# Patient Record
Sex: FEMALE | Race: WHITE | NOT HISPANIC OR LATINO | Employment: OTHER | URBAN - NONMETROPOLITAN AREA
[De-identification: names, ages, dates, MRNs, and addresses within clinical notes are randomized per-mention and may not be internally consistent; named-entity substitution may affect disease eponyms.]

---

## 2019-10-31 ENCOUNTER — OFFICE VISIT (OUTPATIENT)
Dept: INTERNAL MEDICINE | Facility: CLINIC | Age: 69
End: 2019-10-31

## 2019-10-31 VITALS
HEIGHT: 62 IN | WEIGHT: 129 LBS | HEART RATE: 83 BPM | TEMPERATURE: 97.8 F | SYSTOLIC BLOOD PRESSURE: 120 MMHG | RESPIRATION RATE: 16 BRPM | BODY MASS INDEX: 23.74 KG/M2 | DIASTOLIC BLOOD PRESSURE: 70 MMHG | OXYGEN SATURATION: 98 %

## 2019-10-31 DIAGNOSIS — R59.0 HILAR LYMPHADENOPATHY: ICD-10-CM

## 2019-10-31 DIAGNOSIS — M25.50 CHRONIC JOINT PAIN: ICD-10-CM

## 2019-10-31 DIAGNOSIS — J43.2 CENTRILOBULAR EMPHYSEMA (HCC): ICD-10-CM

## 2019-10-31 DIAGNOSIS — G89.29 CHRONIC JOINT PAIN: ICD-10-CM

## 2019-10-31 DIAGNOSIS — E78.2 MIXED HYPERLIPIDEMIA: ICD-10-CM

## 2019-10-31 DIAGNOSIS — I10 ESSENTIAL HYPERTENSION: ICD-10-CM

## 2019-10-31 DIAGNOSIS — E87.1 HYPONATREMIA: ICD-10-CM

## 2019-10-31 DIAGNOSIS — R59.0 MEDIASTINAL LYMPHADENOPATHY: ICD-10-CM

## 2019-10-31 DIAGNOSIS — E03.9 ACQUIRED HYPOTHYROIDISM: ICD-10-CM

## 2019-10-31 DIAGNOSIS — R91.8 MASS OF UPPER LOBE OF RIGHT LUNG: Primary | ICD-10-CM

## 2019-10-31 DIAGNOSIS — E27.49 ADRENAL HEMORRHAGE (HCC): ICD-10-CM

## 2019-10-31 DIAGNOSIS — E74.39 GLUCOSE INTOLERANCE: ICD-10-CM

## 2019-10-31 PROCEDURE — 99203 OFFICE O/P NEW LOW 30 MIN: CPT | Performed by: FAMILY MEDICINE

## 2019-10-31 RX ORDER — LEVOTHYROXINE SODIUM 0.12 MG/1
125 TABLET ORAL DAILY
Refills: 0 | COMMUNITY
Start: 2019-10-08 | End: 2019-10-31 | Stop reason: SDUPTHER

## 2019-10-31 RX ORDER — DICLOFENAC SODIUM 75 MG/1
75 TABLET, DELAYED RELEASE ORAL DAILY PRN
COMMUNITY
Start: 2019-09-02 | End: 2020-08-17 | Stop reason: SDUPTHER

## 2019-10-31 RX ORDER — TRAMADOL HYDROCHLORIDE 50 MG/1
50 TABLET ORAL 3 TIMES DAILY
Refills: 3 | COMMUNITY
Start: 2019-10-18 | End: 2020-03-30 | Stop reason: SDUPTHER

## 2019-10-31 RX ORDER — BACLOFEN 10 MG/1
TABLET ORAL
Refills: 1 | COMMUNITY
Start: 2019-10-08 | End: 2020-02-13

## 2019-10-31 RX ORDER — LEVOTHYROXINE SODIUM 0.12 MG/1
125 TABLET ORAL DAILY
Qty: 90 TABLET | Refills: 3 | Status: SHIPPED | OUTPATIENT
Start: 2019-10-31 | End: 2020-09-14

## 2019-10-31 RX ORDER — GABAPENTIN 300 MG/1
300 CAPSULE ORAL 3 TIMES DAILY
COMMUNITY
Start: 2019-09-18 | End: 2019-12-17 | Stop reason: SDUPTHER

## 2019-10-31 RX ORDER — PRAVASTATIN SODIUM 40 MG
40 TABLET ORAL DAILY
COMMUNITY
Start: 2019-09-05 | End: 2020-05-08 | Stop reason: SDUPTHER

## 2019-10-31 RX ORDER — TIOTROPIUM BROMIDE INHALATION SPRAY 3.12 UG/1
SPRAY, METERED RESPIRATORY (INHALATION)
COMMUNITY
Start: 2019-09-09 | End: 2019-11-25

## 2019-10-31 RX ORDER — AMLODIPINE BESYLATE 5 MG/1
5 TABLET ORAL DAILY
COMMUNITY
Start: 2019-09-15 | End: 2020-08-28 | Stop reason: SDUPTHER

## 2019-11-03 PROBLEM — I10 ESSENTIAL HYPERTENSION: Status: ACTIVE | Noted: 2019-11-03

## 2019-11-03 PROBLEM — E74.39 GLUCOSE INTOLERANCE: Status: ACTIVE | Noted: 2019-11-03

## 2019-11-03 PROBLEM — E78.2 MIXED HYPERLIPIDEMIA: Status: ACTIVE | Noted: 2019-11-03

## 2019-11-03 PROBLEM — E03.9 ACQUIRED HYPOTHYROIDISM: Status: ACTIVE | Noted: 2019-11-03

## 2019-11-03 PROBLEM — J43.2 CENTRILOBULAR EMPHYSEMA (HCC): Status: ACTIVE | Noted: 2019-11-03

## 2019-11-25 ENCOUNTER — OFFICE VISIT (OUTPATIENT)
Dept: PULMONOLOGY | Facility: CLINIC | Age: 69
End: 2019-11-25

## 2019-11-25 VITALS
OXYGEN SATURATION: 95 % | HEART RATE: 80 BPM | HEIGHT: 62 IN | DIASTOLIC BLOOD PRESSURE: 78 MMHG | RESPIRATION RATE: 18 BRPM | SYSTOLIC BLOOD PRESSURE: 140 MMHG | WEIGHT: 126 LBS | BODY MASS INDEX: 23.19 KG/M2

## 2019-11-25 DIAGNOSIS — R06.02 SHORTNESS OF BREATH: Primary | ICD-10-CM

## 2019-11-25 DIAGNOSIS — R93.89 ABNORMAL CT OF THE CHEST: ICD-10-CM

## 2019-11-25 DIAGNOSIS — R59.0 SUPRACLAVICULAR LYMPHADENOPATHY: ICD-10-CM

## 2019-11-25 DIAGNOSIS — J44.9 CHRONIC OBSTRUCTIVE PULMONARY DISEASE, UNSPECIFIED COPD TYPE (HCC): ICD-10-CM

## 2019-11-25 DIAGNOSIS — J41.0 CHRONIC BRONCHITIS, SIMPLE (HCC): ICD-10-CM

## 2019-11-25 DIAGNOSIS — F17.200 SMOKING: ICD-10-CM

## 2019-11-25 PROCEDURE — 95012 NITRIC OXIDE EXP GAS DETER: CPT | Performed by: INTERNAL MEDICINE

## 2019-11-25 PROCEDURE — 99205 OFFICE O/P NEW HI 60 MIN: CPT | Performed by: INTERNAL MEDICINE

## 2019-11-25 NOTE — PROGRESS NOTES
"CONSULT NOTE    Requested by:   Hattie Askew MD      Chief Complaint   Patient presents with   • Consult   • Breathing Problem       Subjective:  Prashanth Prajapati is a 69 y.o. female.     History of Present Illness   Patient was sent in today for evaluation of shortness of breath and abnormal CT. Patient says that for the past few years, she has had shortness of breath. Patient has had decreased exercise capacity that has been progressive for the past few years. Patient also says that she brings up phlegm in the morning along with cough.     she also reports having 1-2 recent episodes of \"bronchitis\" & pneumonia, over the past 1 year.    Patient also notes having progressive worsening in his ability to walk up the hill or walk up a flight of stairs.     Patient reports smoking 1 pack per day for the past 50 years.  she is currently smoking 1/2 pack a day.    Patient does have a family history of lung cancer in her father.    She also had a CT scan in September that was reported abnormal.     The following portions of the patient's history were reviewed and updated as appropriate: allergies, current medications, past family history, past medical history, past social history and past surgical history.    Review of Systems   Constitutional: Negative for chills, fatigue and fever.   HENT: Negative for sinus pressure, sneezing and sore throat.    Respiratory: Positive for cough and shortness of breath. Negative for chest tightness and wheezing.    Cardiovascular: Positive for palpitations. Negative for leg swelling.   Psychiatric/Behavioral: Negative for sleep disturbance.   All other systems reviewed and are negative.      Past Medical History:   Diagnosis Date   • Arthritis    • COPD (chronic obstructive pulmonary disease) (CMS/[facility])    • Hyperlipidemia    • Hypertension    • Hypothyroidism        Social History     Tobacco Use   • Smoking status: Current Every Day Smoker     Packs/day: 1.00     Years: 50.00 " "    Pack years: 50.00     Types: Cigarettes   • Smokeless tobacco: Never Used   Substance Use Topics   • Alcohol use: No     Frequency: Never         Objective:  Visit Vitals  /78   Pulse 80   Resp 18   Ht 157.5 cm (62.01\")   Wt 57.2 kg (126 lb)   SpO2 95%   BMI 23.04 kg/m²       Physical Exam   Constitutional: She is oriented to person, place, and time. She appears well-developed.   HENT:   Dentures noted.   Eyes: EOM are normal.   Neck: Neck supple. No JVD present.   Cardiovascular: Normal rate and regular rhythm.   Pulmonary/Chest: Effort normal. She has wheezes. She has no rales.   Somewhat hyperresonant to percussion.  Decreased Air Entry Bilaterally.   Musculoskeletal:   Gait was normal.   Lymphadenopathy:        Left: Supraclavicular (non tender) adenopathy present.   Neurological: She is alert and oriented to person, place, and time.   Skin: Skin is warm and dry.   Psychiatric: She has a normal mood and affect. Her behavior is normal.   Vitals reviewed.        Assessment/Plan:  Prashanth was seen today for consult and breathing problem.    Diagnoses and all orders for this visit:    Shortness of breath  -     Cancel: Pulmonary Function Test  -     Nitric Oxide  -     Peak Flow  -     Pulmonary Function Test; Future    Chronic obstructive pulmonary disease, unspecified COPD type (CMS/HCC)  -     Cancel: Pulmonary Function Test  -     Nitric Oxide  -     Peak Flow  -     Pulmonary Function Test; Future    Chronic bronchitis, simple (CMS/HCC)    Smoking    Abnormal CT of the chest    Supraclavicular lymphadenopathy  -     Ambulatory Referral to General Surgery    Other orders  -     tiotropium bromide-olodaterol (STIOLTO RESPIMAT) 2.5-2.5 MCG/ACT aerosol solution inhaler; Inhale 2 puffs Daily.        Return in about 2 weeks (around 12/9/2019) for Recheck, PFT F/U, Overbook, For Biopsy results.    DISCUSSION(if any):  Last CT scan was reviewed in great detail with the patient. Images reviewed personally. "   The CT scan images were consistent with abnormality including emphysema, right upper lobe nodule (2.5 cm or so) and enlarged mediastinal lymph nodes.    I have also reviewed her last discharge summary from [facility] in [place] that mentions acute adrenal hemorrhage, hypertension and was suggested to have a left supraclavicular biopsy, but could not be done. It also listed that she left against medical advice. This was from 9/14/2019.     ===========================  ===========================    FeNO level & Peak flow was obtained and discussed with the patient today.    PFTs were ordered for follow up visit.     ===========================  ===========================    Orders as above.    I have told the patient, that in my view, shortness of breath is most likely from underlying chronic obstructive lung disease.     Patient was given education and demonstration on how to use the medicine.     Side effects, of prescribed medicines, discussed.    Patient was also instructed on compliance and adherence with instructions.     I have discussed the need to quit smoking as soon as possible.    Patient was offered modalities such as Chantix/nicotine patches/Wellbutrin to aid in smoking cessation.    The patient will get back to us regarding the choice, once a decision has been taken.     Patient was given reading material, as appropriate.     Patient was asked to call with any concerns.     I told the patient that although she has multiple mediastinal lymph nodes, and a right upper lobe nodule, she has easily accessible left supraclavicular lymph nodes which are palpable on physical exam.    I was able to discuss the findings with Dr. Owen who has graciously agreed to see the patient within the next 2 to 3 days.    I told the patient that the first step would be to obtain supraclavicular lymph node biopsy and if that does not reveal diagnosis consistent with the CT findings, then we will definitely  proceed with endobronchial ultrasound-guided needle biopsy.    Patient will be followed clinically to assess for response to treatment and further recommendations will be made, based on response.      Dictated utilizing Dragon dictation.    This document was electronically signed by Stefany Olvera MD on 11/25/19 at 10:58 AM

## 2019-11-27 ENCOUNTER — OFFICE VISIT (OUTPATIENT)
Dept: SURGERY | Facility: CLINIC | Age: 69
End: 2019-11-27

## 2019-11-27 VITALS
DIASTOLIC BLOOD PRESSURE: 84 MMHG | WEIGHT: 127.8 LBS | BODY MASS INDEX: 23.52 KG/M2 | HEIGHT: 62 IN | TEMPERATURE: 98.3 F | HEART RATE: 89 BPM | OXYGEN SATURATION: 97 % | SYSTOLIC BLOOD PRESSURE: 142 MMHG

## 2019-11-27 DIAGNOSIS — R59.0 LYMPHADENOPATHY, SUPRACLAVICULAR: Primary | ICD-10-CM

## 2019-11-27 PROCEDURE — 99204 OFFICE O/P NEW MOD 45 MIN: CPT | Performed by: SURGERY

## 2019-11-27 RX ORDER — SODIUM CHLORIDE, SODIUM LACTATE, POTASSIUM CHLORIDE, CALCIUM CHLORIDE 600; 310; 30; 20 MG/100ML; MG/100ML; MG/100ML; MG/100ML
50 INJECTION, SOLUTION INTRAVENOUS CONTINUOUS
Status: CANCELLED | OUTPATIENT
Start: 2019-12-02

## 2019-11-27 RX ORDER — SODIUM CHLORIDE 0.9 % (FLUSH) 0.9 %
3 SYRINGE (ML) INJECTION EVERY 12 HOURS SCHEDULED
Status: CANCELLED | OUTPATIENT
Start: 2019-12-02

## 2019-11-27 RX ORDER — ACETAMINOPHEN 500 MG
500 TABLET ORAL EVERY 6 HOURS PRN
COMMUNITY

## 2019-11-27 RX ORDER — SODIUM CHLORIDE 0.9 % (FLUSH) 0.9 %
10 SYRINGE (ML) INJECTION AS NEEDED
Status: CANCELLED | OUTPATIENT
Start: 2019-12-02

## 2019-11-27 RX ORDER — CEFAZOLIN SODIUM 2 G/50ML
2 SOLUTION INTRAVENOUS ONCE
Status: CANCELLED | OUTPATIENT
Start: 2019-12-02 | End: 2019-11-27

## 2019-12-02 ENCOUNTER — HOSPITAL ENCOUNTER (OUTPATIENT)
Facility: HOSPITAL | Age: 69
Setting detail: HOSPITAL OUTPATIENT SURGERY
Discharge: HOME | End: 2019-12-02
Attending: SURGERY | Admitting: SURGERY

## 2019-12-02 ENCOUNTER — ANESTHESIA EVENT (OUTPATIENT)
Dept: PERIOP | Facility: HOSPITAL | Age: 69
End: 2019-12-02

## 2019-12-02 ENCOUNTER — ANESTHESIA (OUTPATIENT)
Dept: PERIOP | Facility: HOSPITAL | Age: 69
End: 2019-12-02

## 2019-12-02 VITALS
HEART RATE: 66 BPM | DIASTOLIC BLOOD PRESSURE: 64 MMHG | TEMPERATURE: 97.9 F | BODY MASS INDEX: 23.37 KG/M2 | WEIGHT: 127 LBS | SYSTOLIC BLOOD PRESSURE: 109 MMHG | RESPIRATION RATE: 16 BRPM | OXYGEN SATURATION: 92 % | HEIGHT: 62 IN

## 2019-12-02 DIAGNOSIS — R59.0 LYMPHADENOPATHY, SUPRACLAVICULAR: ICD-10-CM

## 2019-12-02 PROCEDURE — 25010000003 CEFAZOLIN SODIUM-DEXTROSE 2-3 GM-%(50ML) RECONSTITUTED SOLUTION: Performed by: SURGERY

## 2019-12-02 PROCEDURE — 38520 BIOPSY/REMOVAL LYMPH NODES: CPT | Performed by: SURGERY

## 2019-12-02 PROCEDURE — 25010000002 ONDANSETRON PER 1 MG: Performed by: NURSE ANESTHETIST, CERTIFIED REGISTERED

## 2019-12-02 PROCEDURE — 25010000003 LIDOCAINE 1 % SOLUTION: Performed by: SURGERY

## 2019-12-02 PROCEDURE — 25010000002 PROPOFOL 10 MG/ML EMULSION: Performed by: NURSE ANESTHETIST, CERTIFIED REGISTERED

## 2019-12-02 PROCEDURE — 25010000002 FENTANYL CITRATE (PF) 100 MCG/2ML SOLUTION: Performed by: NURSE ANESTHETIST, CERTIFIED REGISTERED

## 2019-12-02 PROCEDURE — 25010000002 MIDAZOLAM PER 1MG: Performed by: NURSE ANESTHETIST, CERTIFIED REGISTERED

## 2019-12-02 RX ORDER — SODIUM CHLORIDE 0.9 % (FLUSH) 0.9 %
3 SYRINGE (ML) INJECTION EVERY 12 HOURS SCHEDULED
Status: DISCONTINUED | OUTPATIENT
Start: 2019-12-02 | End: 2019-12-02 | Stop reason: HOSPADM

## 2019-12-02 RX ORDER — HYDROCODONE BITARTRATE AND ACETAMINOPHEN 7.5; 325 MG/1; MG/1
1 TABLET ORAL ONCE AS NEEDED
Status: CANCELLED | OUTPATIENT
Start: 2019-12-02 | End: 2019-12-12

## 2019-12-02 RX ORDER — CEFAZOLIN SODIUM 2 G/50ML
2 SOLUTION INTRAVENOUS ONCE
Status: COMPLETED | OUTPATIENT
Start: 2019-12-02 | End: 2019-12-02

## 2019-12-02 RX ORDER — ONDANSETRON 2 MG/ML
INJECTION INTRAMUSCULAR; INTRAVENOUS AS NEEDED
Status: DISCONTINUED | OUTPATIENT
Start: 2019-12-02 | End: 2019-12-02 | Stop reason: SURG

## 2019-12-02 RX ORDER — PROMETHAZINE HYDROCHLORIDE 25 MG/ML
12.5 INJECTION, SOLUTION INTRAMUSCULAR; INTRAVENOUS ONCE AS NEEDED
Status: CANCELLED | OUTPATIENT
Start: 2019-12-02

## 2019-12-02 RX ORDER — FENTANYL CITRATE 50 UG/ML
INJECTION, SOLUTION INTRAMUSCULAR; INTRAVENOUS AS NEEDED
Status: DISCONTINUED | OUTPATIENT
Start: 2019-12-02 | End: 2019-12-02 | Stop reason: SURG

## 2019-12-02 RX ORDER — KETAMINE HCL IN NACL, ISO-OSM 100MG/10ML
SYRINGE (ML) INJECTION AS NEEDED
Status: DISCONTINUED | OUTPATIENT
Start: 2019-12-02 | End: 2019-12-02 | Stop reason: SURG

## 2019-12-02 RX ORDER — SODIUM CHLORIDE, SODIUM LACTATE, POTASSIUM CHLORIDE, CALCIUM CHLORIDE 600; 310; 30; 20 MG/100ML; MG/100ML; MG/100ML; MG/100ML
50 INJECTION, SOLUTION INTRAVENOUS CONTINUOUS
Status: DISCONTINUED | OUTPATIENT
Start: 2019-12-02 | End: 2019-12-02 | Stop reason: HOSPADM

## 2019-12-02 RX ORDER — SODIUM CHLORIDE 0.9 % (FLUSH) 0.9 %
10 SYRINGE (ML) INJECTION AS NEEDED
Status: DISCONTINUED | OUTPATIENT
Start: 2019-12-02 | End: 2019-12-02 | Stop reason: HOSPADM

## 2019-12-02 RX ORDER — LIDOCAINE HYDROCHLORIDE 10 MG/ML
INJECTION, SOLUTION INFILTRATION; PERINEURAL AS NEEDED
Status: DISCONTINUED | OUTPATIENT
Start: 2019-12-02 | End: 2019-12-02 | Stop reason: HOSPADM

## 2019-12-02 RX ORDER — HYDROCODONE BITARTRATE AND ACETAMINOPHEN 7.5; 325 MG/1; MG/1
1 TABLET ORAL EVERY 4 HOURS PRN
Qty: 10 TABLET | Refills: 0 | Status: ON HOLD | OUTPATIENT
Start: 2019-12-02 | End: 2020-01-03 | Stop reason: SDUPTHER

## 2019-12-02 RX ORDER — ONDANSETRON 2 MG/ML
4 INJECTION INTRAMUSCULAR; INTRAVENOUS ONCE AS NEEDED
Status: CANCELLED | OUTPATIENT
Start: 2019-12-02

## 2019-12-02 RX ORDER — MIDAZOLAM HYDROCHLORIDE 2 MG/2ML
INJECTION, SOLUTION INTRAMUSCULAR; INTRAVENOUS AS NEEDED
Status: DISCONTINUED | OUTPATIENT
Start: 2019-12-02 | End: 2019-12-02 | Stop reason: SURG

## 2019-12-02 RX ORDER — LIDOCAINE HYDROCHLORIDE 20 MG/ML
INJECTION, SOLUTION INTRAVENOUS AS NEEDED
Status: DISCONTINUED | OUTPATIENT
Start: 2019-12-02 | End: 2019-12-02 | Stop reason: SURG

## 2019-12-02 RX ORDER — MAGNESIUM HYDROXIDE 1200 MG/15ML
LIQUID ORAL AS NEEDED
Status: DISCONTINUED | OUTPATIENT
Start: 2019-12-02 | End: 2019-12-02 | Stop reason: HOSPADM

## 2019-12-02 RX ADMIN — Medication 10 MG: at 13:52

## 2019-12-02 RX ADMIN — ONDANSETRON 4 MG: 2 INJECTION INTRAMUSCULAR; INTRAVENOUS at 14:35

## 2019-12-02 RX ADMIN — MIDAZOLAM HYDROCHLORIDE 1 MG: 1 INJECTION, SOLUTION INTRAMUSCULAR; INTRAVENOUS at 13:47

## 2019-12-02 RX ADMIN — SODIUM CHLORIDE, POTASSIUM CHLORIDE, SODIUM LACTATE AND CALCIUM CHLORIDE 50 ML/HR: 600; 310; 30; 20 INJECTION, SOLUTION INTRAVENOUS at 11:25

## 2019-12-02 RX ADMIN — PROPOFOL 100 MCG/KG/MIN: 10 INJECTION, EMULSION INTRAVENOUS at 13:52

## 2019-12-02 RX ADMIN — LIDOCAINE HYDROCHLORIDE 60 MG: 20 INJECTION, SOLUTION INTRAVENOUS at 13:52

## 2019-12-02 RX ADMIN — CEFAZOLIN SODIUM 2 G: 2 SOLUTION INTRAVENOUS at 13:53

## 2019-12-02 RX ADMIN — FENTANYL CITRATE 50 MCG: 50 INJECTION INTRAMUSCULAR; INTRAVENOUS at 13:47

## 2019-12-02 NOTE — OP NOTE
PROCEDURE DATE: 12/2/2019    SURGEON: Bebe Owen MD, FACS    PREOPERATIVE DIAGNOSIS: Lymphadenopathy, supraclavicular [R59.0]     POSTOPERATIVE DIAGNOSIS: Same    PROCEDURE: Left supraclavicular lymph node biopsy    ANESTHESIA: MAC    EBL: 10ml    SPECIMENS:    A Lymph Node Tissue · TISSUE PATHOLOGY EXAM  · ADDITIONAL PATHOLOGY REQUEST   Bebe Owen MD 12/2/19 1435 No     Description: SPECIMEN IN RPMI FOR FLOW       INDICATIONS FOR THE PROCEDURE: Patient is a 8 seen today with a history of intractable 9-year-old female who was recently found to have a 2.6 cm mass in the right upper lobe on CT imaging at outside facility with associated mediastinal instability or adenopathy.  This raise concern for a primary lung adenocarcinoma.  She was actually referred by oncology for a biopsy for tissue diagnosis.  She had counseled regarding the risks, benefits, and alternatives of surgical procedure, and I provided her informed consent to proceed.    DESCRIPTION OF THE PROCEDURE:  The patient was seen and examined in the preoperative holding area on the day of surgery and there are no changes to the medical history.  The patient was then taken to the operating room and placed supine on the operating table where a timeout is performed using the WHO checklist.  The patient received appropriate preoperative antibiotics and SCDs were placed for DVT prophylaxis.  The surgical site was marked in the preoperative holding area.    Following the satisfactory induction of anesthesia the patient's left neck was prepped and draped in the usual sterile fashion.  The previously marked area of the skin overlying the palpable left supraclavicular lymph node was preemptively anesthetized with 1% lidocaine.  The area was incised with 15 blade knife along a natural crease in the skin.  This was deepened through the epidermis and dermis into the subcutaneous tissue.  Careful blunt dissection was used to dissect down to the palpable  lymph node.  Care was taken to avoid injury to the external jugular vein which was overlying, and adherent to the left node in question.  The lymph node was noted to be grossly enlarged and hardened.  Rather tedious dissection was required to dissect the node away from the adjacent structures in the neck including the omental resection around the enlarged lymph node.  Hemostasis was achieved with a combination of surgical clips and a careful Bovie electrocautery.     Once this was completed, the node was able to be delivered out into the wound.  The pedicle was identified and doubly clipped and subsequently divided with the Metzenbaum scissors.  The node was then dissected with a 10 blade knife and passed off the field as specimen for pathology.  Half of the node was placed into buffered formalin.  The remainder was placed into RPMI solution in the event of flow cytometry is necessary.  Hemostasis was checked and found to be excellent.  The wound was then closed in layers.  The dead space was closed with interrupted 3-0 Vicryl.  The same was used for the subtendinous fat.  The skin was closed using a running, the particular 4-0 Vicryl.    0.25% Marcaine was injected into the wounds for postoperative analgesia.  Mastisol and steri strips were applied to the wounds and covered with dry sterile dressings.    There were no immediate complications noted and the procedure was well tolerated by the patient.  All sponge, needle,  and instrument  counts were correct at the conclusion of procedure.  Dr. Owen was present scrubbed for the procedure and its entirety.  The patient was awakened from anesthesia and taken to the recovery room in good condition.

## 2019-12-02 NOTE — ANESTHESIA POSTPROCEDURE EVALUATION
Patient: Prashanth Prajapati    Procedure Summary     Date:  12/02/19 Room / Location:  [facility] OR  /  [facility]    Anesthesia Start:  1345 Anesthesia Stop:  1501    Procedure:  SUPRACLAVICULAR LYMPH NODE BIOPSY/EXCISION (Left Neck) Diagnosis:       Lymphadenopathy, supraclavicular      (Lymphadenopathy, supraclavicular [R59.0])    Surgeon:  Bebe Owen MD Provider:  Olivia Glasgow CRNA    Anesthesia Type:  MAC ASA Status:  3          Anesthesia Type: MAC  Last vitals  BP   107/62 (12/02/19 1500)   Temp   97.9 °F (36.6 °C) (12/02/19 1500)   Pulse   65 (12/02/19 1500)   Resp   16 (12/02/19 1500)     SpO2   92 % (12/02/19 1500)     Post Anesthesia Care and Evaluation    Patient location during evaluation: PHASE II  Patient participation: complete - patient participated  Level of consciousness: awake and alert  Pain score: 0  Pain management: satisfactory to patient  Airway patency: patent  Anesthetic complications: No anesthetic complications  PONV Status: none  Cardiovascular status: acceptable and stable  Respiratory status: acceptable  Hydration status: acceptable

## 2019-12-02 NOTE — DISCHARGE INSTRUCTIONS
No pushing, pulling, tugging,  heavy lifting, or strenuous activity.  No major decision making, driving, or drinking alcoholic beverages for 24 hours. ( due to the medications you have  received)  Always use good hand hygiene/washing techniques.  NO driving while taking pain medications.    * if you have an incision:  Check your incision area every day for signs of infection.   Check for:  * more redness, swelling, or pain  *more fluid or blood  *warmth  *pus or bad smell    To assist you in voiding:  Drink plenty of fluids  Listen to running water while attempting to void.    If you are unable to urinate and you have an uncomfortable urge to void or it has been   6 hours since you were discharged, return to the Emergency Room

## 2019-12-02 NOTE — ANESTHESIA PREPROCEDURE EVALUATION
Anesthesia Evaluation     history of anesthetic complications: PONV  NPO Solid Status: > 8 hours  NPO Liquid Status: > 8 hours           Airway   Mallampati: II  TM distance: >3 FB  Neck ROM: full  No difficulty expected  Dental - normal exam     Pulmonary - normal exam   (+) a smoker Current Smoked day of surgery, COPD moderate,   Cardiovascular - normal exam  Exercise tolerance: good (4-7 METS)    (+) hypertension well controlled 2 medications or greater, hyperlipidemia,       Neuro/Psych- negative ROS  GI/Hepatic/Renal/Endo    (+)   thyroid problem hypothyroidism    Musculoskeletal     Abdominal    Substance History - negative use     OB/GYN          Other   arthritis,                      Anesthesia Plan    ASA 3     MAC   (Risks and benefits discussed including risk of aspiration, recall and dental damage. All patient questions answered. Will continue with POC.)  intravenous induction     Anesthetic plan, all risks, benefits, and alternatives have been provided, discussed and informed consent has been obtained with: patient.    Plan discussed with CRNA.

## 2019-12-05 ENCOUNTER — DOCUMENTATION (OUTPATIENT)
Dept: PULMONOLOGY | Facility: CLINIC | Age: 69
End: 2019-12-05

## 2019-12-05 NOTE — PROGRESS NOTES
Pathology results were reviewed.  The patient underwent excisional supraclavicular lymph node biopsy.  The lymph node biopsy was consistent with small cell cancer.    Dr. Owen and myself discussed the findings on pathology.  We also reviewed the patient's chart and she already has an appointment with Dr. Cao on 11 December.    I will forward this information to Dr. Cao.      This document was electronically signed by Stefany Olvera MD on 12/05/19 at 2:27 PM

## 2019-12-06 NOTE — PROGRESS NOTES
Subjective   Prashanth Prajapati is a 69 y.o. female.   Chief Complaint   Patient presents with   • Follow-up     biopsy        History of Present Illness    returns the office today for follow-up after recently undergoing an excisional biopsy of a left supraclavicular lymph node on 12/2/2019.  Unfortunately, this was positive for a metastatic small cell carcinoma.  It was previously suspected based on prior imaging that the patient did have a lung primary and that the noted supraclavicular adenopathy would be metastatic in nature.  Patient the pathology findings, and seems that this is the case.  Fortunately, the patient is scheduled to see medical oncology in the near future.  She has also seen Dr. Olvera in follow-up prior to today's appointment.  Surgically, there have been no postoperative issues.  She denies pain related to the area.  She denies problems with the incision.    The following portions of the patient's history were reviewed and updated as appropriate: allergies, current medications, past family history, past medical history, past social history, past surgical history and problem list.    Review of Systems   Constitutional: Negative for chills, fever and unexpected weight change.   HENT: Negative for hearing loss, trouble swallowing and voice change.    Eyes: Negative for visual disturbance.   Respiratory: Negative for apnea, cough, chest tightness, shortness of breath and wheezing.    Cardiovascular: Negative for chest pain, palpitations and leg swelling.   Gastrointestinal: Negative for abdominal distention, abdominal pain, anal bleeding, blood in stool, constipation, diarrhea, nausea, rectal pain and vomiting.   Endocrine: Negative for cold intolerance and heat intolerance.   Genitourinary: Negative for difficulty urinating, dysuria and flank pain.   Musculoskeletal: Negative for back pain and gait problem.   Skin: Negative for color change, rash and wound.   Neurological: Negative for  "dizziness, syncope, speech difficulty, weakness, light-headedness, numbness and headaches.   Hematological: Negative for adenopathy. Does not bruise/bleed easily.   Psychiatric/Behavioral: Negative for confusion. The patient is not nervous/anxious.        Objective    /76   Pulse 82   Temp 97.5 °F (36.4 °C)   Ht 157.5 cm (62\")   Wt 57.6 kg (127 lb)   SpO2 98%   BMI 23.23 kg/m²     Physical Exam   Constitutional: She is oriented to person, place, and time. She appears well-developed and well-nourished.   HENT:   Head: Normocephalic and atraumatic.   Neck:   Left supraclavicular incision is healing well without cellulitis or erythema.  Near this incision, there is persistent, stable supraclavicular and cervical chain adenopathy.   Cardiovascular: Regular rhythm.   Pulmonary/Chest: Effort normal.   Neurological: She is alert and oriented to person, place, and time.   Skin: Skin is warm and dry.   Psychiatric: She has a normal mood and affect. Her behavior is normal.       Assessment/Plan   Prashanth was seen today for follow-up.    Diagnoses and all orders for this visit:    SCLC (small cell lung carcinoma) (CMS/HCC)      Ms. Prajapati is a 69-year-old female patient with recently diagnosed with metastatic small cell lung cancer.  From the standpoint of her recent lymph node biopsy, she is doing well.  She has upcoming follow-up with medical oncology, and was advised to keep this appointment.  I discussed with her that my role in her treatment was limited to providing technical services such as the lymph node biopsy.  It is my suspicion that she will likely need a Port-A-Cath in the future for administration of chemotherapy.  She and I discussed this in brief.  I advised her that if it is the case that she needs a port inserted, we can make these arrangements through this office over the phone without requiring additional office evaluation.  She understands this.  She will contact me after seeing Dr. Cao in " consultation if a port is needed.

## 2019-12-07 LAB
LAB AP CASE REPORT: NORMAL
PATH REPORT.ADDENDUM SPEC: NORMAL
PATH REPORT.FINAL DX SPEC: NORMAL

## 2019-12-09 ENCOUNTER — OFFICE VISIT (OUTPATIENT)
Dept: PULMONOLOGY | Facility: CLINIC | Age: 69
End: 2019-12-09

## 2019-12-09 VITALS
OXYGEN SATURATION: 95 % | HEART RATE: 90 BPM | WEIGHT: 128 LBS | DIASTOLIC BLOOD PRESSURE: 70 MMHG | HEIGHT: 62 IN | SYSTOLIC BLOOD PRESSURE: 122 MMHG | RESPIRATION RATE: 18 BRPM | BODY MASS INDEX: 23.55 KG/M2

## 2019-12-09 DIAGNOSIS — R06.02 SHORTNESS OF BREATH: ICD-10-CM

## 2019-12-09 DIAGNOSIS — R06.02 SOB (SHORTNESS OF BREATH): Primary | ICD-10-CM

## 2019-12-09 DIAGNOSIS — J44.9 CHRONIC OBSTRUCTIVE PULMONARY DISEASE, UNSPECIFIED COPD TYPE (HCC): ICD-10-CM

## 2019-12-09 DIAGNOSIS — C34.90 SMALL CELL LUNG CANCER IN ADULT (HCC): ICD-10-CM

## 2019-12-09 DIAGNOSIS — R93.89 ABNORMAL CT OF THE CHEST: Primary | ICD-10-CM

## 2019-12-09 DIAGNOSIS — R59.0 SUPRACLAVICULAR LYMPHADENOPATHY: ICD-10-CM

## 2019-12-09 PROCEDURE — 99214 OFFICE O/P EST MOD 30 MIN: CPT | Performed by: INTERNAL MEDICINE

## 2019-12-09 PROCEDURE — 94060 EVALUATION OF WHEEZING: CPT | Performed by: INTERNAL MEDICINE

## 2019-12-09 PROCEDURE — 94726 PLETHYSMOGRAPHY LUNG VOLUMES: CPT | Performed by: INTERNAL MEDICINE

## 2019-12-09 PROCEDURE — 94729 DIFFUSING CAPACITY: CPT | Performed by: INTERNAL MEDICINE

## 2019-12-09 PROCEDURE — G0009 ADMIN PNEUMOCOCCAL VACCINE: HCPCS | Performed by: INTERNAL MEDICINE

## 2019-12-09 PROCEDURE — 90670 PCV13 VACCINE IM: CPT | Performed by: INTERNAL MEDICINE

## 2019-12-09 NOTE — PROGRESS NOTES
"Chief Complaint   Patient presents with   • Follow-up   • Shortness of Breath       Subjective   Prashanth Prajapati is a 69 y.o. female.     History of Present Illness   Patient comes today for follow up of shortness of breath and COPD.     Patient says that her symptoms have been stable since the last clinic visit. she reports no recent exacerbations.     Patient is using medications, as prescribed. Exercise tolerance has also remained stable.     Continues to smoke 1/2 pack per day.    She also underwent left supraclavicular lymph node biopsy.       The following portions of the patient's history were reviewed and updated as appropriate: allergies, current medications, past family history, past medical history, past social history and past surgical history.    Review of Systems   Constitutional: Negative for chills and fever.   HENT: Negative for rhinorrhea, sinus pressure and sore throat.    Respiratory: Negative for cough, chest tightness, shortness of breath and wheezing.    Psychiatric/Behavioral: Negative for sleep disturbance.       Objective   Visit Vitals  /70   Pulse 90   Resp 18   Ht 157.5 cm (62\")   Wt 58.1 kg (128 lb)   SpO2 95%   BMI 23.41 kg/m²       Physical Exam   Constitutional: She is oriented to person, place, and time. She appears well-developed and well-nourished.   HENT:   Left supraclavicular area with dressing.    Eyes: EOM are normal.   Neck: Neck supple.   Cardiovascular: Normal rate and regular rhythm.   Pulmonary/Chest: Effort normal. No respiratory distress.   Somewhat hyperresonant to percussion  Somewhat decreased A/E with out wheezing noted.   Musculoskeletal: She exhibits no edema.   Neurological: She is alert and oriented to person, place, and time.   Skin: Skin is warm and dry.   Psychiatric: She has a normal mood and affect.   Vitals reviewed.      Assessment/Plan   Prashanth was seen today for follow-up and shortness of breath.    Diagnoses and all orders for this " visit:    Abnormal CT of the chest    Supraclavicular lymphadenopathy    Chronic obstructive pulmonary disease, unspecified COPD type (CMS/HCC)  -     Pulmonary Function Test    Small cell lung cancer in adult (CMS/HCC)    Shortness of breath  -     Pulmonary Function Test    Other orders  -     Pneumococcal Conjugate Vaccine 13-Valent All           Return in about 3 months (around 3/9/2020) for Recheck.    DISCUSSION (if any):  Pathology was reviewed with her and her sister in great detail.     PFTs were performed today and reviewed. Showed mild obstruction.     We have reviewed her pulmonary medications in great detail.    Any needed adjustments to her pulmonary medications, either for clinical or insurance coverage reasons, have been made and are reflected in the orders.    Compliance with medications stressed.     Side effects of prescribed medications discussed with the patient    Patient was strongly encouraged to quit smoking as soon as possible.    Small Cell lung cancer. Will need to be followed soon with Oncology. She has an appointment with Dr. Cao. She was asked to keep that appointment.     After discussion about the need for pneumococcal-13 vaccination, it was administered today.      Dictated utilizing Dragon dictation.    This document was electronically signed by Stefany Olvera MD on 12/09/19 at 11:14 AM

## 2019-12-10 ENCOUNTER — OFFICE VISIT (OUTPATIENT)
Dept: SURGERY | Facility: CLINIC | Age: 69
End: 2019-12-10

## 2019-12-10 VITALS
OXYGEN SATURATION: 98 % | BODY MASS INDEX: 23.37 KG/M2 | WEIGHT: 127 LBS | SYSTOLIC BLOOD PRESSURE: 148 MMHG | TEMPERATURE: 97.5 F | HEART RATE: 82 BPM | DIASTOLIC BLOOD PRESSURE: 76 MMHG | HEIGHT: 62 IN

## 2019-12-10 DIAGNOSIS — C34.90 SCLC (SMALL CELL LUNG CARCINOMA) (HCC): Primary | ICD-10-CM

## 2019-12-10 PROCEDURE — 99024 POSTOP FOLLOW-UP VISIT: CPT | Performed by: SURGERY

## 2019-12-11 ENCOUNTER — CONSULT (OUTPATIENT)
Dept: ONCOLOGY | Facility: CLINIC | Age: 69
End: 2019-12-11

## 2019-12-11 VITALS
TEMPERATURE: 97 F | HEART RATE: 81 BPM | WEIGHT: 127 LBS | HEIGHT: 62 IN | DIASTOLIC BLOOD PRESSURE: 73 MMHG | SYSTOLIC BLOOD PRESSURE: 142 MMHG | BODY MASS INDEX: 23.37 KG/M2

## 2019-12-11 DIAGNOSIS — C34.90 SCLC (SMALL CELL LUNG CARCINOMA) (HCC): Primary | ICD-10-CM

## 2019-12-11 PROCEDURE — 99205 OFFICE O/P NEW HI 60 MIN: CPT | Performed by: INTERNAL MEDICINE

## 2019-12-11 RX ORDER — LIDOCAINE AND PRILOCAINE 25; 25 MG/G; MG/G
CREAM TOPICAL AS NEEDED
Qty: 30 G | Refills: 3 | Status: SHIPPED | OUTPATIENT
Start: 2019-12-11

## 2019-12-11 NOTE — PROGRESS NOTES
DATE OF CONSULTATION: 12/11/2019    REFERRING PHYSICIAN: Hattie Askew MD    Dear Hattie Mendoza MD  Thank you for asking for my medical advice on this patient. I saw her in the  [place] office on 12/11/2019     REASON FOR CONSULTATION: Extensive stage small cell lung cancer     HISTORY OF PRESENT ILLNESS: The patient is a very pleasant 69 y.o.  female   who was in her usual state of health until September 2019.  Patient presented with right lower ribs pain.  She was admitted to outside hospital in [place] scans revealed right upper lobe lung mass adrenal met and mediastinal adenopathy.  The plan was to proceed with biopsy however patient has sold her house and she was in process of moving to [place].  When she got to [facility] she is so primary care provider Dr. Askew who did set her up with Dr. Owen who did lymph node biopsy left cervical lymph node that came back consistent with small cell lung cancer.  The patient was referred to me for further recommendations.    SUBJECTIVE: When I saw the patient today she is here with her sister but she is complaining of cough, she is been having mild shortness of breath where she lost 25 pounds over the last 2 years.    Review of Systems   Constitutional: Positive for fatigue and unexpected weight change. Negative for activity change, appetite change, chills and fever.   HENT: Negative for hearing loss, mouth sores, nosebleeds, sore throat and trouble swallowing.    Eyes: Negative for visual disturbance.   Respiratory: Positive for cough. Negative for chest tightness, shortness of breath and wheezing.    Cardiovascular: Negative for chest pain, palpitations and leg swelling.   Gastrointestinal: Negative for abdominal distention, abdominal pain, blood in stool, constipation, diarrhea, nausea, rectal pain and vomiting.   Endocrine: Negative for cold intolerance and heat intolerance.   Genitourinary: Negative for difficulty urinating, dysuria,  frequency and urgency.   Musculoskeletal: Negative for arthralgias, back pain, gait problem, joint swelling and myalgias.   Skin: Negative for rash.   Neurological: Negative for dizziness, tremors, syncope, weakness, light-headedness, numbness and headaches.   Hematological: Negative for adenopathy. Does not bruise/bleed easily.   Psychiatric/Behavioral: Negative for confusion, sleep disturbance and suicidal ideas. The patient is not nervous/anxious.        Past Medical History:   Diagnosis Date   • Arthritis    • Body piercing     both ears   • Colitis    • COPD (chronic obstructive pulmonary disease) (CMS/[facility])    • Diabetes mellitus (CMS/[facility])    • Elevated cholesterol    • Full dentures    • Hyperlipidemia    • Hypertension    • Hypothyroidism    • PONV (postoperative nausea and vomiting)    • Wears glasses     for dentures       Social History     Socioeconomic History   • Marital status:      Spouse name: Not on file   • Number of children: Not on file   • Years of education: Not on file   • Highest education level: Not on file   Tobacco Use   • Smoking status: Current Every Day Smoker     Packs/day: 0.50     Years: 50.00     Pack years: 25.00     Types: Cigarettes   • Smokeless tobacco: Never Used   Substance and Sexual Activity   • Alcohol use: No     Frequency: Never   • Drug use: No   • Sexual activity: Defer       Family History   Problem Relation Age of Onset   • Arthritis Mother    • Hypertension Mother    • Hyperlipidemia Mother    • Lung cancer Father    • Cancer Father    • Diabetes Sister    • Arthritis Maternal Grandmother    • Stroke Maternal Grandmother    • Diabetes Brother        Past Surgical History:   Procedure Laterality Date   • CARPAL TUNNEL RELEASE Left    • CERVICAL LYMPH NODE BIOPSY/EXCISION Left 12/2/2019    Procedure: SUPRACLAVICULAR LYMPH NODE BIOPSY/EXCISION;  Surgeon: Bebe Owen MD;  Location: [facility];  Service: General   • COLONOSCOPY  2014   • HAND SURGERY   "02/2018   • KNEE ARTHROSCOPY     • LAPAROSCOPIC TUBAL LIGATION     • MOUTH SURGERY      full extraction of teeth       Allergies   Allergen Reactions   • Erythromycin Itching   • Penicillins Other (See Comments)     Report unknown reaction in childhood. Has taken ampicillin without difficulty.            Current Outpatient Medications:   •  amLODIPine (NORVASC) 5 MG tablet, Take 5 mg by mouth Daily., Disp: , Rfl:   •  baclofen (LIORESAL) 10 MG tablet, 10 mg QHS, Disp: , Rfl: 1  •  Calcium Carbonate-Vitamin D (CALCIUM-CARB 600 + D) 600-125 MG-UNIT tablet, Take 500 mg by mouth 2 (Two) Times a Day., Disp: , Rfl:   •  diclofenac (VOLTAREN) 75 MG EC tablet, Take 75 mg by mouth Daily As Needed., Disp: , Rfl:   •  gabapentin (NEURONTIN) 300 MG capsule, Take 300 mg by mouth 3 (Three) Times a Day., Disp: , Rfl:   •  HYDROcodone-acetaminophen (NORCO) 7.5-325 MG per tablet, Take 1 tablet by mouth Every 4 (Four) Hours As Needed for Moderate Pain, Disp: 10 tablet, Rfl: 0  •  levothyroxine (SYNTHROID, LEVOTHROID) 125 MCG tablet, Take 1 tablet by mouth Daily., Disp: 90 tablet, Rfl: 3  •  pravastatin (PRAVACHOL) 40 MG tablet, Take 40 mg by mouth Daily., Disp: , Rfl:   •  tiotropium bromide-olodaterol (STIOLTO RESPIMAT) 2.5-2.5 MCG/ACT aerosol solution inhaler, Inhale 2 puffs Daily., Disp: 1 inhaler, Rfl: 5  •  traMADol (ULTRAM) 50 MG tablet, Take 50 mg by mouth 3 (Three) Times a Day., Disp: , Rfl: 3  •  acetaminophen (TYLENOL) 500 MG tablet, Take 500 mg by mouth Every 6 (Six) Hours As Needed for Mild Pain ., Disp: , Rfl:   •  lidocaine-prilocaine (EMLA) 2.5-2.5 % cream, Apply  topically to the appropriate area as directed As Needed (45-60 minutes prior to port access.  Cover with saran/plastic wrap.)., Disp: 30 g, Rfl: 3    PHYSICAL EXAMINATION:   /73   Pulse 81   Temp 97 °F (36.1 °C)   Ht 157.5 cm (62\")   Wt 57.6 kg (127 lb)   Breastfeeding No   BMI 23.23 kg/m²    ECOG Performance Status: 1 - Symptomatic but completely " ambulatory  General Appearance:  alert, cooperative, no apparent distress and appears stated age   Neurologic/Psychiatric: A&O x 3, gait steady, appropriate affect, strength 5/5 in all muscle groups   HEENT:  Normocephalic, without obvious abnormality, mucous membranes moist   Neck: Supple, symmetrical, trachea midline, no adenopathy;  No thyromegaly, masses, or tenderness   Lungs:   Clear to auscultation bilaterally; respirations regular, even, and unlabored bilaterally   Heart:  Regular rate and rhythm, no murmurs appreciated   Abdomen:   Soft, non-tender, non-distended and no organomegaly   Lymph nodes: No cervical, supraclavicular, inguinal or axillary adenopathy noted   Extremities: Normal, atraumatic; no clubbing, cyanosis, or edema    Skin: No rashes, ulcers, or suspicious lesions noted       Admission on 12/02/2019, Discharged on 12/02/2019   Component Date Value Ref Range Status   • Addendum 12/02/2019    Final                    Value:This result contains rich text formatting which cannot be displayed here.   • Case Report 12/02/2019    Final                    Value:Surgical Pathology Report                         Case: [id number]                                  Authorizing Provider:  Bebe Owen MD       Collected:           12/02/2019 02:35 PM          Ordering Location:     [facility] OR Received:            12/03/2019 08:48 AM          Pathologist:           Kathe Rodriguez DO                                                        Specimens:   1) - Lymph Node                                                                                     2) - Lymph Node, SPECIMEN IN RPMI FOR FLOW                                                • Final Diagnosis 12/02/2019    Final                    Value:This result contains rich text formatting which cannot be displayed here.        No results found.      DIAGNOSTIC DATA:   1. Radiology: reviewed by me and documented in the patient's  chart  2. Dr. Olvera's note reviewed by me and documented in the  chart.   3. Pathology report: Left cervical lymph node biopsy done on December 2, 2019 revealed small cell lung cancer  4. Laboratory data: None available    ASSESSMENT: The patient is a very pleasant 69 y.o.  female  with right upper lobe extensive stage small cell lung cancer    PROBLEM LIST:   1.  Right upper lobe extensive stage small cell lung cancer, [id number] stage IVb:  A.  Presented with right lower ribs pain  B.  CT chest revealed 3.5 cm right upper lobe lung mass, right adrenal nodule, mediastinal adenopathy, and left cervical lymphadenopathy.  C.  Status post left cervical lymph node biopsy done by Dr. Owen December 2, 2019  D.  Pathology consistent with small cell carcinoma  2.  COPD  3.  Hypertension  4.  Hypercholesterolemia  5.  Hypothyroid    PLAN:   1. I had a long discussion today with the patient and her sister about her  new diagnosis of lung cancer. I did go over the final pathology report in  detail with both of them. I reviewed the patient's documents including refereing provider's notes, lab results, imaging, and path report.   2.  I explained to the patient goal of treatment would be palliative.  Patient is interested in active cancer treatment.  3.  I will order MRI brain as well as whole-body PET scan to complete her staging work-up.  4.  We will arrange for port placement with Dr. Owen.  5.  The patient be treated with carboplatin and etoposide plus Tecentriq.  This is FDA approved regimen.  It is in compliance with NCCN guidelines.  The addition of immunotherapy to chemotherapy has decreased risk of death by 30%.  6.  We will do premedication with my nurse practitioner.  7. We reviewed the potential side effects of this regimen including fatigue, vomiting and nausea, hair loss, nephropathy, neuropathy, hearing loss, myelosuppression, and risk of infusion reaction.  8. We discussed potential side effects of immunotherapy  including but not limited to immune mediated reactions with thyroiditis, pneumonitis, hepatitis, colitis, rash, and electrolytes abnormalities, fatigue, multiorgan failure, and possibly death.  9.  I will monitor the patient blood work including blood counts kidney function liver function and electrolytes were  10.  I will repeat the patient's scans after cycle #3.  11.  She will follow-up with me for cycle #2.  Rupesh Cao MD  12/11/2019

## 2019-12-13 ENCOUNTER — HOSPITAL ENCOUNTER (OUTPATIENT)
Dept: MRI IMAGING | Facility: HOSPITAL | Age: 69
Discharge: HOME | End: 2019-12-13

## 2019-12-13 ENCOUNTER — HOSPITAL ENCOUNTER (OUTPATIENT)
Dept: CT IMAGING | Facility: HOSPITAL | Age: 69
Discharge: HOME | End: 2019-12-13

## 2019-12-13 ENCOUNTER — HOSPITAL ENCOUNTER (OUTPATIENT)
Dept: CT IMAGING | Facility: HOSPITAL | Age: 69
Discharge: HOME | End: 2019-12-13
Admitting: INTERNAL MEDICINE

## 2019-12-13 DIAGNOSIS — C34.90 SCLC (SMALL CELL LUNG CARCINOMA) (HCC): ICD-10-CM

## 2019-12-13 LAB — CREAT BLDA-MCNC: 0.7 MG/DL (ref 0.6–1.3)

## 2019-12-13 PROCEDURE — 25010000002 IOPAMIDOL 61 % SOLUTION: Performed by: INTERNAL MEDICINE

## 2019-12-13 PROCEDURE — 82565 ASSAY OF CREATININE: CPT

## 2019-12-13 PROCEDURE — 0 GADOBENATE DIMEGLUMINE 529 MG/ML SOLUTION: Performed by: INTERNAL MEDICINE

## 2019-12-13 PROCEDURE — 71260 CT THORAX DX C+: CPT

## 2019-12-13 PROCEDURE — A9577 INJ MULTIHANCE: HCPCS | Performed by: INTERNAL MEDICINE

## 2019-12-13 PROCEDURE — 74177 CT ABD & PELVIS W/CONTRAST: CPT

## 2019-12-13 PROCEDURE — 70553 MRI BRAIN STEM W/O & W/DYE: CPT

## 2019-12-13 RX ADMIN — GADOBENATE DIMEGLUMINE 11 ML: 529 INJECTION, SOLUTION INTRAVENOUS at 11:49

## 2019-12-13 RX ADMIN — IOPAMIDOL 100 ML: 612 INJECTION, SOLUTION INTRAVENOUS at 11:27

## 2019-12-16 ENCOUNTER — OFFICE VISIT (OUTPATIENT)
Dept: ONCOLOGY | Facility: CLINIC | Age: 69
End: 2019-12-16

## 2019-12-16 VITALS
TEMPERATURE: 98 F | BODY MASS INDEX: 23.41 KG/M2 | DIASTOLIC BLOOD PRESSURE: 70 MMHG | SYSTOLIC BLOOD PRESSURE: 129 MMHG | WEIGHT: 128 LBS | HEART RATE: 89 BPM

## 2019-12-16 DIAGNOSIS — C34.90 SCLC (SMALL CELL LUNG CARCINOMA) (HCC): Primary | ICD-10-CM

## 2019-12-16 PROCEDURE — 99215 OFFICE O/P EST HI 40 MIN: CPT | Performed by: NURSE PRACTITIONER

## 2019-12-16 RX ORDER — OMEPRAZOLE 40 MG/1
40 CAPSULE, DELAYED RELEASE ORAL DAILY
Qty: 30 CAPSULE | Refills: 3 | Status: SHIPPED | OUTPATIENT
Start: 2019-12-16 | End: 2020-03-11 | Stop reason: SDUPTHER

## 2019-12-16 RX ORDER — DIPHENHYDRAMINE HYDROCHLORIDE 50 MG/ML
50 INJECTION INTRAMUSCULAR; INTRAVENOUS AS NEEDED
Status: CANCELLED | OUTPATIENT
Start: 2019-12-17

## 2019-12-16 RX ORDER — FAMOTIDINE 10 MG/ML
20 INJECTION, SOLUTION INTRAVENOUS AS NEEDED
Status: CANCELLED | OUTPATIENT
Start: 2019-12-17

## 2019-12-16 RX ORDER — PROCHLORPERAZINE MALEATE 10 MG
10 TABLET ORAL ONCE
Status: CANCELLED | OUTPATIENT
Start: 2019-12-18

## 2019-12-16 RX ORDER — PROCHLORPERAZINE MALEATE 10 MG
10 TABLET ORAL ONCE
Status: CANCELLED | OUTPATIENT
Start: 2019-12-19

## 2019-12-16 RX ORDER — ONDANSETRON HYDROCHLORIDE 8 MG/1
8 TABLET, FILM COATED ORAL 3 TIMES DAILY PRN
Qty: 30 TABLET | Refills: 5 | Status: SHIPPED | OUTPATIENT
Start: 2019-12-16 | End: 2020-06-03

## 2019-12-16 RX ORDER — PALONOSETRON 0.05 MG/ML
0.25 INJECTION, SOLUTION INTRAVENOUS ONCE
Status: CANCELLED | OUTPATIENT
Start: 2019-12-17

## 2019-12-16 RX ORDER — SODIUM CHLORIDE 9 MG/ML
250 INJECTION, SOLUTION INTRAVENOUS ONCE
Status: CANCELLED | OUTPATIENT
Start: 2019-12-18

## 2019-12-16 RX ORDER — SODIUM CHLORIDE 9 MG/ML
250 INJECTION, SOLUTION INTRAVENOUS ONCE
Status: CANCELLED | OUTPATIENT
Start: 2019-12-17

## 2019-12-16 RX ORDER — SODIUM CHLORIDE 9 MG/ML
250 INJECTION, SOLUTION INTRAVENOUS ONCE
Status: CANCELLED | OUTPATIENT
Start: 2019-12-19

## 2019-12-16 NOTE — PROGRESS NOTES
CHEMOTHERAPY PREPARATION    Prashanth Prajapati  [id number]  1950    Chief Complaint: Chemotherapy education visit    History of present illness:  Prashanth Prajapati is a 69 y.o. year old female who is here today for chemotherapy preparation and needs assessment. The patient has been diagnosed with extensive stage small cell lung cancer and is scheduled to begin treatment with Etoposide, carboplatin, and tecentriq  .     Oncology History:       SCLC (small cell lung carcinoma) (CMS/HCC)    12/11/2019 Initial Diagnosis     SCLC (small cell lung carcinoma) (CMS/HCC)      12/11/2019 Cancer Staged     Staging form: Lung, AJCC 8th Edition  - Clinical stage from 12/11/2019: Stage MIRTHA (cT2, cN2, cM1b) - Signed by Rupesh Cao MD on 12/11/2019 12/17/2019 -  Chemotherapy     OP LUNG Atezolizumab / CARBOplatin AUC=5 / Etoposide (SCLC)         Past Medical History:   Diagnosis Date   • Arthritis    • Body piercing     both ears   • Colitis    • COPD (chronic obstructive pulmonary disease) (CMS/HCC)    • Diabetes mellitus (CMS/HCC)    • Elevated cholesterol    • Full dentures    • Hyperlipidemia    • Hypertension    • Hypothyroidism    • PONV (postoperative nausea and vomiting)    • Wears glasses     for dentures       Past Surgical History:   Procedure Laterality Date   • CARPAL TUNNEL RELEASE Left    • CERVICAL LYMPH NODE BIOPSY/EXCISION Left 12/2/2019    Procedure: SUPRACLAVICULAR LYMPH NODE BIOPSY/EXCISION;  Surgeon: Bebe Owen MD;  Location: [facility];  Service: General   • COLONOSCOPY  2014   • HAND SURGERY  02/2018   • KNEE ARTHROSCOPY     • LAPAROSCOPIC TUBAL LIGATION     • MOUTH SURGERY      full extraction of teeth       MEDICATIONS: The current medication list was reviewed and reconciled.     Allergies:  is allergic to erythromycin and penicillins.    Family History   Problem Relation Age of Onset   • Arthritis Mother    • Hypertension Mother    • Hyperlipidemia Mother    • Lung cancer Father     • Cancer Father    • Diabetes Sister    • Arthritis Maternal Grandmother    • Stroke Maternal Grandmother    • Diabetes Brother          Review of Systems   Constitutional: Positive for activity change and fatigue. Negative for chills and fever.   HENT: Negative.    Eyes: Negative.    Respiratory: Negative for apnea, cough, shortness of breath and wheezing.    Cardiovascular: Negative.    Gastrointestinal: Negative for abdominal pain, anal bleeding, constipation, diarrhea, nausea and vomiting.        Reflux     Endocrine: Negative.    Genitourinary: Negative.    Musculoskeletal: Negative.    Skin: Negative.    Allergic/Immunologic: Negative.    Neurological: Positive for numbness. Negative for dizziness, tremors, weakness and light-headedness.   Hematological: Negative.    Psychiatric/Behavioral: Negative.        Physical Exam  Vital Signs: /70   Pulse 89   Temp 98 °F (36.7 °C)   Wt 58.1 kg (128 lb)   BMI 23.41 kg/m²    General Appearance:  alert, cooperative, no apparent distress and appears stated age   Neurologic/Psychiatric: A&O x 3, gait steady, appropriate affect   HEENT:  Normocephalic, without obvious abnormality, mucous membranes moist   Lungs:   Clear to auscultation bilaterally; respirations regular, even, and unlabored bilaterally   Heart:  Regular rate and rhythm, no murmurs appreciated   Extremities: Normal, atraumatic; no clubbing, cyanosis, or edema    Skin: No rashes, lesions, or abnormal coloration noted     ECOG Performance Status: (1) Restricted in physically strenuous activity, ambulatory and able to do work of light nature          NEEDS ASSESSMENTS    Genetics  The patient's new diagnosis and family history have been reviewed for genetic counseling needs. A genetic referral is not recommended.     Psychosocial  The patient has completed a PHQ-9 Depression Screening and the Distress Thermometer (DT) today.   PHQ-9 results show 0 (No Depression). The patient scored their distress  "today as 0 on a scale of 0-10 with 0 being no distress and 10 being extreme distress.   Problems marked by the patient as being an issue for them within the last week include emotional problems and physical problems.   Results were reviewed along with psychosocial resources offered by our cancer center. Our oncology social worker will be flagged for a DT score of 4 or above, and a same day call will be made for a score of 9 or 10. A mental health referral is not recommended at this time. The patient is not accepting of a referral to [facility].   Copies of patient's questionnaires will be scanned into EMR for details and further reference.    Barriers to care  A barriers form was also completed by the patient today. We discussed services offered by our facility to help her have adequate access to care. The patient was given the name and card for our Oncology Social Worker, Eufemia Luu. Based upon barriers assessment today, the patient will not require a follow-up call from the  to further discuss needs.   A copy of the barriers form will also be scanned into EMR for details and further reference.     VAD Assessment  The patient and I discussed planned intervenous chemotherapy as well as other IV treatments that are often needed throughout the course of treatment. These may include, but are not limited to blood transfusions, antibiotics, and IV hydration. The vasculature does not appear to be adequate for multiple peripheral IVs throughout their treatment course. Discussed risks and benefits of VADs. The patient would like to pursue Port-A-Cath insertion prior to initiation of treatment.     Advanced Care Planning  The patient and I discussed advanced care planning, \"Conversations that Matter\".   This service was offered, free of charge, for development of advance directives with a certified ACP facilitator.  The patient does not have an up-to-date advanced directive. This document is not on " file with our office. The patient is interested in an appointment with one of our facilitators to create or update their advanced directives.      Palliative Care  The patient and I discussed palliative care services. Palliative care is not the same as Hospice care. This is specialized medical care for people living with serious illness with the goal of improving quality of life for the patient and their family. [facility] has partnered with [facility] to offer our patients outpatient palliative care early along with their treatment to assist in coordination of care, symptom management, pain management, and medical decision making.  Oncology criteria for palliative care referral is met at this time. The patient is not interested in a palliative care consultation.     Additional Referral needs  Nutrition  Social work      CHEMOTHERAPY EDUCATION    Booklets Given: Chemotherapy and You [x]  Eating Hints [x]    Sexuality/Fertility Books []      Chemotherapy/Biotherapy Education Sheets: (list all that apply)  nausea management, acid reflux management, diarrhea management, Cancer resourse contacts information, skin and mouth care and vaccination information                                                                                                                                                                 Chemotherapy Regimen:   Treatment Plans     Name Type Plan dates Plan Provider         Active    OP LUNG Atezolizumab / CARBOplatin AUC=5 / Etoposide (SCLC) ONCOLOGY TREATMENT  12/16/2019 - Present Rupesh Cao MD                    TOPICS EDUCATION PROVIDED COMMENTS   ANEMIA:  role of RBC, cause, s/s, ways to manage, role of transfusion [x]    THROMBOCYTOPENIA:  role of platelet, cause, s/s, ways to prevent bleeding, things to avoid, when to seek help [x]    NEUTROPENIA:  role of WBC, cause, infection precautions, s/s of infection, when to call MD [x]    NUTRITION & APPETITE CHANGES:  importance  of maintaining healthy diet & weight, ways to manage to improve intake, dietary consult, exercise regimen [x]    DIARRHEA:  causes, s/s of dehydration, ways to manage, dietary changes, when to call MD [x]    CONSTIPATION:  causes, ways to manage, dietary changes, when to call MD [x]    NAUSEA & VOMITING:  cause, use of antiemetics, dietary changes, when to call MD [x]    MOUTH SORES:  causes, oral care, ways to manage [x]    ALOPECIA:  cause, ways to manage, resources [x]    INFERTILITY & SEXUALITY:  causes, sexuality changes, ways to manage,  [x]    NERVOUS SYSTEM CHANGES:  causes, s/s, neuropathies, cognitive changes, ways to manage [x]    PAIN:  causes, ways to manage [x] ????   SKIN & NAIL CHANGES:  cause, s/s, ways to manage [x]    ORGAN TOXICITIES:  cause, s/s, need for diagnostic tests, labs, when to notify MD [x]    SURVIVORSHIP:  distress, distress assessment, secondary malignancies, early/late effects, follow-up, social issues, social support [x]    HOME CARE:  use of spill kits, storing of PO chemo, how to manage bodily fluids [x]    MISCELLANEOUS:  drug interactions, administration, vesicant, et [x]        Assessment and Plan:    Diagnoses and all orders for this visit:    SCLC (small cell lung carcinoma) (CMS/HCC)  -     ondansetron (ZOFRAN) 8 MG tablet; Take 1 tablet by mouth 3 (Three) Times a Day As Needed for Nausea or Vomiting.  -     CBC and Differential; Future  -     Comprehensive metabolic panel; Future  -     TSH; Future  -     T3, Free; Future  -     T4, free; Future  -     Lab Appointment Request; Future  -     Clinic Appointment Request; Future  -     Infusion Appointment Request; Future  -     ON[facility] INFUSION APPOINTMENT REQUEST 01; Future  -     ON[facility] INFUSION APPOINTMENT REQUEST 01; Future    Other orders  -     omeprazole (priLOSEC) 40 MG capsule; Take 1 capsule by mouth Daily.        This was a 60 minute face-to-face visit with 55 minutes spent in  counseling and coordination of care  as documented above.   The patient and I have reviewed their new cancer diagnosis and scheduled treatment plan. Needs assessment was completed including genetics, psychosocial needs, barriers to care, VAD evaluation, advanced care planning, and palliative care services. Referrals have been ordered as appropriate based upon our evaluation and patient desires.     Chemotherapy teaching was also completed today as documented above. Adequate time was given to answer all questions to her satisfaction. Patient and family are aware of their care team members and contact information if they have questions or problems throughout the treatment course. Needs assessments and education has been completed. The patient is adequately prepared to begin treatment as scheduled.     Appointment scheduled with Dr. Owen for port-a-cath placement. We will proceed with cycle #1 with PIV.     I discussed with the patient that MRI showed no evidence of metastatic disease within the brain.  We also reviewed that Ct scan showed overall progression of the patient's disease with enlargement of the primary tumor seen in the medial right upper lobe,  enlargement of a prevascular lymph node and enlargement of the patient's adrenal masses.    I consulted dietary as well as social work. Patient will be seen in infusion on day of treatment.       Kathe Higuera, APRN

## 2019-12-17 ENCOUNTER — DOCUMENTATION (OUTPATIENT)
Dept: NUTRITION | Facility: HOSPITAL | Age: 69
End: 2019-12-17

## 2019-12-17 ENCOUNTER — PREP FOR SURGERY (OUTPATIENT)
Dept: OTHER | Facility: HOSPITAL | Age: 69
End: 2019-12-17

## 2019-12-17 ENCOUNTER — TELEPHONE (OUTPATIENT)
Dept: INTERNAL MEDICINE | Facility: CLINIC | Age: 69
End: 2019-12-17

## 2019-12-17 ENCOUNTER — INFUSION (OUTPATIENT)
Dept: ONCOLOGY | Facility: HOSPITAL | Age: 69
End: 2019-12-17

## 2019-12-17 VITALS
TEMPERATURE: 98.2 F | DIASTOLIC BLOOD PRESSURE: 83 MMHG | WEIGHT: 126 LBS | BODY MASS INDEX: 23.05 KG/M2 | RESPIRATION RATE: 18 BRPM | HEART RATE: 88 BPM | SYSTOLIC BLOOD PRESSURE: 166 MMHG

## 2019-12-17 DIAGNOSIS — G89.29 CHRONIC JOINT PAIN: ICD-10-CM

## 2019-12-17 DIAGNOSIS — C34.90 SCLC (SMALL CELL LUNG CARCINOMA) (HCC): Primary | ICD-10-CM

## 2019-12-17 DIAGNOSIS — M25.50 CHRONIC JOINT PAIN: ICD-10-CM

## 2019-12-17 LAB
ALBUMIN SERPL-MCNC: 3.8 G/DL (ref 3.5–5.2)
ALBUMIN/GLOB SERPL: 0.8 G/DL
ALP SERPL-CCNC: 93 U/L (ref 39–117)
ALT SERPL W P-5'-P-CCNC: 8 U/L (ref 1–33)
ANION GAP SERPL CALCULATED.3IONS-SCNC: 15 MMOL/L (ref 5–15)
AST SERPL-CCNC: 14 U/L (ref 1–32)
BASOPHILS # BLD AUTO: 0.06 10*3/MM3 (ref 0–0.2)
BASOPHILS NFR BLD AUTO: 0.8 % (ref 0–1.5)
BILIRUB SERPL-MCNC: 0.3 MG/DL (ref 0.2–1.2)
BUN BLD-MCNC: 10 MG/DL (ref 8–23)
BUN/CREAT SERPL: 14.5 (ref 7–25)
CALCIUM SPEC-SCNC: 9.5 MG/DL (ref 8.6–10.5)
CHLORIDE SERPL-SCNC: 94 MMOL/L (ref 98–107)
CO2 SERPL-SCNC: 22 MMOL/L (ref 22–29)
CREAT BLD-MCNC: 0.69 MG/DL (ref 0.57–1)
DEPRECATED RDW RBC AUTO: 44 FL (ref 37–54)
EOSINOPHIL # BLD AUTO: 0.32 10*3/MM3 (ref 0–0.4)
EOSINOPHIL NFR BLD AUTO: 4.1 % (ref 0.3–6.2)
ERYTHROCYTE [DISTWIDTH] IN BLOOD BY AUTOMATED COUNT: 13.4 % (ref 12.3–15.4)
GFR SERPL CREATININE-BSD FRML MDRD: 85 ML/MIN/1.73
GLOBULIN UR ELPH-MCNC: 4.5 GM/DL
GLUCOSE BLD-MCNC: 119 MG/DL (ref 65–99)
HCT VFR BLD AUTO: 37.2 % (ref 34–46.6)
HGB BLD-MCNC: 12.1 G/DL (ref 12–15.9)
IMM GRANULOCYTES # BLD AUTO: 0.03 10*3/MM3 (ref 0–0.05)
IMM GRANULOCYTES NFR BLD AUTO: 0.4 % (ref 0–0.5)
LYMPHOCYTES # BLD AUTO: 1.84 10*3/MM3 (ref 0.7–3.1)
LYMPHOCYTES NFR BLD AUTO: 23.4 % (ref 19.6–45.3)
MCH RBC QN AUTO: 29.3 PG (ref 26.6–33)
MCHC RBC AUTO-ENTMCNC: 32.5 G/DL (ref 31.5–35.7)
MCV RBC AUTO: 90.1 FL (ref 79–97)
MONOCYTES # BLD AUTO: 1.16 10*3/MM3 (ref 0.1–0.9)
MONOCYTES NFR BLD AUTO: 14.7 % (ref 5–12)
NEUTROPHILS # BLD AUTO: 4.47 10*3/MM3 (ref 1.7–7)
NEUTROPHILS NFR BLD AUTO: 56.6 % (ref 42.7–76)
NRBC BLD AUTO-RTO: 0 /100 WBC (ref 0–0.2)
PLATELET # BLD AUTO: 338 10*3/MM3 (ref 140–450)
PMV BLD AUTO: 8.8 FL (ref 6–12)
POTASSIUM BLD-SCNC: 4.3 MMOL/L (ref 3.5–5.2)
PROT SERPL-MCNC: 8.3 G/DL (ref 6–8.5)
RBC # BLD AUTO: 4.13 10*6/MM3 (ref 3.77–5.28)
SODIUM BLD-SCNC: 131 MMOL/L (ref 136–145)
T3FREE SERPL-MCNC: 2.88 PG/ML (ref 2–4.4)
T4 FREE SERPL-MCNC: 1.69 NG/DL (ref 0.93–1.7)
TSH SERPL DL<=0.05 MIU/L-ACNC: 0.56 UIU/ML (ref 0.27–4.2)
WBC NRBC COR # BLD: 7.88 10*3/MM3 (ref 3.4–10.8)

## 2019-12-17 PROCEDURE — 84443 ASSAY THYROID STIM HORMONE: CPT

## 2019-12-17 PROCEDURE — 84481 FREE ASSAY (FT-3): CPT

## 2019-12-17 PROCEDURE — 36415 COLL VENOUS BLD VENIPUNCTURE: CPT

## 2019-12-17 PROCEDURE — 96375 TX/PRO/DX INJ NEW DRUG ADDON: CPT

## 2019-12-17 PROCEDURE — 96413 CHEMO IV INFUSION 1 HR: CPT

## 2019-12-17 PROCEDURE — 25010000002 PALONOSETRON 0.25 MG/5ML SOLUTION PREFILLED SYRINGE: Performed by: NURSE PRACTITIONER

## 2019-12-17 PROCEDURE — 25010000002 DEXAMETHASONE PER 1 MG: Performed by: NURSE PRACTITIONER

## 2019-12-17 PROCEDURE — 25010000002 ETOPOSIDE 500 MG/25ML SOLUTION 25 ML VIAL: Performed by: NURSE PRACTITIONER

## 2019-12-17 PROCEDURE — 96417 CHEMO IV INFUS EACH ADDL SEQ: CPT

## 2019-12-17 PROCEDURE — 25010000002 CARBOPLATIN PER 50 MG: Performed by: NURSE PRACTITIONER

## 2019-12-17 PROCEDURE — 84439 ASSAY OF FREE THYROXINE: CPT

## 2019-12-17 PROCEDURE — 80053 COMPREHEN METABOLIC PANEL: CPT

## 2019-12-17 PROCEDURE — 25010000002 FOSAPREPITANT PER 1 MG: Performed by: NURSE PRACTITIONER

## 2019-12-17 PROCEDURE — 96367 TX/PROPH/DG ADDL SEQ IV INF: CPT

## 2019-12-17 PROCEDURE — 25010000002 ATEZOLIZUMAB 1200 MG/20ML SOLUTION 20 ML VIAL: Performed by: NURSE PRACTITIONER

## 2019-12-17 PROCEDURE — 85025 COMPLETE CBC W/AUTO DIFF WBC: CPT

## 2019-12-17 PROCEDURE — 96366 THER/PROPH/DIAG IV INF ADDON: CPT

## 2019-12-17 RX ORDER — SODIUM CHLORIDE 0.9 % (FLUSH) 0.9 %
3 SYRINGE (ML) INJECTION EVERY 12 HOURS SCHEDULED
Status: CANCELLED | OUTPATIENT
Start: 2019-12-17

## 2019-12-17 RX ORDER — HEPARIN SODIUM 5000 [USP'U]/ML
5000 INJECTION, SOLUTION INTRAVENOUS; SUBCUTANEOUS ONCE
Status: CANCELLED | OUTPATIENT
Start: 2019-12-17 | End: 2019-12-17

## 2019-12-17 RX ORDER — PALONOSETRON 0.05 MG/ML
0.25 INJECTION, SOLUTION INTRAVENOUS ONCE
Status: COMPLETED | OUTPATIENT
Start: 2019-12-17 | End: 2019-12-17

## 2019-12-17 RX ORDER — SODIUM CHLORIDE 9 MG/ML
250 INJECTION, SOLUTION INTRAVENOUS ONCE
Status: COMPLETED | OUTPATIENT
Start: 2019-12-17 | End: 2019-12-17

## 2019-12-17 RX ORDER — SODIUM CHLORIDE 0.9 % (FLUSH) 0.9 %
10 SYRINGE (ML) INJECTION AS NEEDED
Status: CANCELLED | OUTPATIENT
Start: 2019-12-17

## 2019-12-17 RX ORDER — SODIUM CHLORIDE, SODIUM LACTATE, POTASSIUM CHLORIDE, CALCIUM CHLORIDE 600; 310; 30; 20 MG/100ML; MG/100ML; MG/100ML; MG/100ML
50 INJECTION, SOLUTION INTRAVENOUS CONTINUOUS
Status: CANCELLED | OUTPATIENT
Start: 2019-12-17

## 2019-12-17 RX ORDER — CLINDAMYCIN PHOSPHATE 900 MG/50ML
900 INJECTION, SOLUTION INTRAVENOUS ONCE
Status: CANCELLED | OUTPATIENT
Start: 2019-12-17 | End: 2019-12-17

## 2019-12-17 RX ORDER — GABAPENTIN 300 MG/1
300 CAPSULE ORAL 3 TIMES DAILY
Qty: 270 CAPSULE | Refills: 1 | Status: SHIPPED | OUTPATIENT
Start: 2019-12-17 | End: 2020-02-07

## 2019-12-17 RX ADMIN — ATEZOLIZUMAB 1200 MG: 1200 INJECTION, SOLUTION INTRAVENOUS at 10:05

## 2019-12-17 RX ADMIN — CARBOPLATIN 480 MG: 10 INJECTION, SOLUTION INTRAVENOUS at 12:19

## 2019-12-17 RX ADMIN — SODIUM CHLORIDE 160 MG: 9 INJECTION, SOLUTION INTRAVENOUS at 12:50

## 2019-12-17 RX ADMIN — SODIUM CHLORIDE 150 MG: 9 INJECTION, SOLUTION INTRAVENOUS at 11:33

## 2019-12-17 RX ADMIN — PALONOSETRON 0.25 MG: 0.25 INJECTION, SOLUTION INTRAVENOUS at 09:41

## 2019-12-17 RX ADMIN — DEXAMETHASONE SODIUM PHOSPHATE 12 MG: 4 INJECTION, SOLUTION INTRAMUSCULAR; INTRAVENOUS at 11:15

## 2019-12-17 RX ADMIN — SODIUM CHLORIDE 250 ML: 9 INJECTION, SOLUTION INTRAVENOUS at 09:40

## 2019-12-17 NOTE — PROGRESS NOTES
Oncology Nutrition Screening    Patient Name:  Prashanth Prajapati  YOB: 1950  MRN: [id number]  Date:  12/17/19  Physician: Rupesh Cao MD    Type of Cancer Treatment:   Chemotherapy: Carbo/Etopside/Tecentriq    Patient Active Problem List   Diagnosis   • Acquired hypothyroidism   • Centrilobular emphysema (CMS/HCC)   • Glucose intolerance   • Essential hypertension   • Mixed hyperlipidemia   • Lymphadenopathy, supraclavicular   • SCLC (small cell lung carcinoma) (CMS/HCC)       Current Outpatient Medications   Medication Sig Dispense Refill   • acetaminophen (TYLENOL) 500 MG tablet Take 500 mg by mouth Every 6 (Six) Hours As Needed for Mild Pain .     • amLODIPine (NORVASC) 5 MG tablet Take 5 mg by mouth Daily.     • baclofen (LIORESAL) 10 MG tablet 10 mg QHS  1   • Calcium Carbonate-Vitamin D (CALCIUM-CARB 600 + D) 600-125 MG-UNIT tablet Take 500 mg by mouth 2 (Two) Times a Day.     • diclofenac (VOLTAREN) 75 MG EC tablet Take 75 mg by mouth Daily As Needed.     • gabapentin (NEURONTIN) 300 MG capsule Take 1 capsule by mouth 3 (Three) Times a Day. 270 capsule 1   • HYDROcodone-acetaminophen (NORCO) 7.5-325 MG per tablet Take 1 tablet by mouth Every 4 (Four) Hours As Needed for Moderate Pain 10 tablet 0   • levothyroxine (SYNTHROID, LEVOTHROID) 125 MCG tablet Take 1 tablet by mouth Daily. 90 tablet 3   • lidocaine-prilocaine (EMLA) 2.5-2.5 % cream Apply  topically to the appropriate area as directed As Needed (45-60 minutes prior to port access.  Cover with saran/plastic wrap.). 30 g 3   • omeprazole (priLOSEC) 40 MG capsule Take 1 capsule by mouth Daily. 30 capsule 3   • ondansetron (ZOFRAN) 8 MG tablet Take 1 tablet by mouth 3 (Three) Times a Day As Needed for Nausea or Vomiting. 30 tablet 5   • pravastatin (PRAVACHOL) 40 MG tablet Take 40 mg by mouth Daily.     • tiotropium bromide-olodaterol (STIOLTO RESPIMAT) 2.5-2.5 MCG/ACT aerosol solution inhaler Inhale 2 puffs Daily. 1 inhaler 5   •  traMADol (ULTRAM) 50 MG tablet Take 50 mg by mouth 3 (Three) Times a Day.  3     No current facility-administered medications for this visit.        Glycemic Risk:   None    Weight:   Height: 62'  Weight:128 lbs.  BMI: 23.4 Weight change: gained 1 lb. Over 1 week    Oral Food Intake:  Regular Diet - No Restrictions    Hydration Status:   How many 8 ounce glass of water of fluid do you drink per day?  6    Enteral Feeding:   N/A    Nutrition Symptoms:   None at this time    Activity:   Not my normal self, but able to be up and about with fairly normal activities     reports that she has been smoking cigarettes. She has a 25.00 pack-year smoking history. She has never used smokeless tobacco. She reports that she does not drink alcohol or use drugs.    Evaluation of Nutritional Risk:   Patient identified to be at nutritional risk and/or for nutritional consultation; will follow patient through course of treatment.   Diagnosis    Pt. Received handouts from the [employer] and Academy of [employer] Dietetics regarding: Quick and Easy Snacks, Foods and Drinks that are easy on the stomach, Nausea/Vomiting diet recommendations, Foods that are high in protein and high in calories, Weight loss nutrition recommendations with recipes, Oncology: Nutrition Tips for Well-Being and Oncology: Cooking Tips. RD encouraged drinking adequate amounts of water daily 8 glasses or 64 ounces and to focus on hydration hourly. RD also discussed the importance of 5-6 small meals daily and trying to snack throughout the day and the use of nutritional supplements between meals. Pt. Has program contact information and has been encouraged to call with questions.            Electronically signed by:  Lulu Gaytan RD  2:08 PM

## 2019-12-17 NOTE — TELEPHONE ENCOUNTER
PATIENT CALLED FOR MED REFILL  gabapentin (NEURONTIN) 300 MG capsule  PHARMACY [facility] [address]    PATIENT CALL BACK NUMBER [phone]

## 2019-12-17 NOTE — TELEPHONE ENCOUNTER
Patient last seen on 10/31/2019, last refill was on 09/18/2019, last angelito ran on 12/11/2019. Medication pended.

## 2019-12-18 ENCOUNTER — INFUSION (OUTPATIENT)
Dept: ONCOLOGY | Facility: HOSPITAL | Age: 69
End: 2019-12-18

## 2019-12-18 VITALS
WEIGHT: 129 LBS | TEMPERATURE: 98.2 F | HEART RATE: 84 BPM | RESPIRATION RATE: 16 BRPM | BODY MASS INDEX: 23.59 KG/M2 | SYSTOLIC BLOOD PRESSURE: 169 MMHG | DIASTOLIC BLOOD PRESSURE: 72 MMHG

## 2019-12-18 DIAGNOSIS — C34.90 SCLC (SMALL CELL LUNG CARCINOMA) (HCC): Primary | ICD-10-CM

## 2019-12-18 PROCEDURE — 63710000001 PROCHLORPERAZINE MALEATE PER 10 MG: Performed by: NURSE PRACTITIONER

## 2019-12-18 PROCEDURE — 96413 CHEMO IV INFUSION 1 HR: CPT

## 2019-12-18 PROCEDURE — 25010000002 ETOPOSIDE 500 MG/25ML SOLUTION 25 ML VIAL: Performed by: NURSE PRACTITIONER

## 2019-12-18 RX ORDER — PROCHLORPERAZINE MALEATE 10 MG
10 TABLET ORAL ONCE
Status: COMPLETED | OUTPATIENT
Start: 2019-12-18 | End: 2019-12-18

## 2019-12-18 RX ORDER — SODIUM CHLORIDE 9 MG/ML
250 INJECTION, SOLUTION INTRAVENOUS ONCE
Status: DISCONTINUED | OUTPATIENT
Start: 2019-12-18 | End: 2019-12-18 | Stop reason: HOSPADM

## 2019-12-18 RX ADMIN — PROCHLORPERAZINE MALEATE 10 MG: 10 TABLET, FILM COATED ORAL at 13:56

## 2019-12-18 RX ADMIN — ETOPOSIDE 160 MG: 20 INJECTION, SOLUTION INTRAVENOUS at 14:15

## 2019-12-19 ENCOUNTER — INFUSION (OUTPATIENT)
Dept: ONCOLOGY | Facility: HOSPITAL | Age: 69
End: 2019-12-19

## 2019-12-19 VITALS
HEART RATE: 84 BPM | RESPIRATION RATE: 16 BRPM | SYSTOLIC BLOOD PRESSURE: 136 MMHG | WEIGHT: 130 LBS | TEMPERATURE: 97.8 F | BODY MASS INDEX: 23.78 KG/M2 | DIASTOLIC BLOOD PRESSURE: 65 MMHG

## 2019-12-19 DIAGNOSIS — C34.90 SCLC (SMALL CELL LUNG CARCINOMA) (HCC): Primary | ICD-10-CM

## 2019-12-19 PROCEDURE — 96413 CHEMO IV INFUSION 1 HR: CPT

## 2019-12-19 PROCEDURE — 63710000001 PROCHLORPERAZINE MALEATE PER 10 MG: Performed by: NURSE PRACTITIONER

## 2019-12-19 PROCEDURE — 25010000002 ETOPOSIDE 500 MG/25ML SOLUTION 25 ML VIAL: Performed by: NURSE PRACTITIONER

## 2019-12-19 RX ORDER — PROCHLORPERAZINE MALEATE 10 MG
10 TABLET ORAL ONCE
Status: COMPLETED | OUTPATIENT
Start: 2019-12-19 | End: 2019-12-19

## 2019-12-19 RX ORDER — SODIUM CHLORIDE 9 MG/ML
250 INJECTION, SOLUTION INTRAVENOUS ONCE
Status: DISCONTINUED | OUTPATIENT
Start: 2019-12-19 | End: 2019-12-19 | Stop reason: HOSPADM

## 2019-12-19 RX ADMIN — PROCHLORPERAZINE MALEATE 10 MG: 10 TABLET, FILM COATED ORAL at 13:39

## 2019-12-19 RX ADMIN — ETOPOSIDE 160 MG: 20 INJECTION, SOLUTION INTRAVENOUS at 13:53

## 2019-12-27 ENCOUNTER — OFFICE VISIT (OUTPATIENT)
Dept: INTERNAL MEDICINE | Facility: CLINIC | Age: 69
End: 2019-12-27

## 2019-12-27 VITALS
TEMPERATURE: 97.7 F | WEIGHT: 129.5 LBS | OXYGEN SATURATION: 96 % | RESPIRATION RATE: 18 BRPM | SYSTOLIC BLOOD PRESSURE: 130 MMHG | DIASTOLIC BLOOD PRESSURE: 78 MMHG | HEART RATE: 86 BPM | BODY MASS INDEX: 23.69 KG/M2

## 2019-12-27 DIAGNOSIS — C34.90 SCLC (SMALL CELL LUNG CARCINOMA) (HCC): ICD-10-CM

## 2019-12-27 DIAGNOSIS — J43.2 CENTRILOBULAR EMPHYSEMA (HCC): ICD-10-CM

## 2019-12-27 DIAGNOSIS — C80.1 NEUROPATHY ASSOCIATED WITH CANCER (HCC): ICD-10-CM

## 2019-12-27 DIAGNOSIS — I10 ESSENTIAL HYPERTENSION: ICD-10-CM

## 2019-12-27 DIAGNOSIS — M25.512 CHRONIC LEFT SHOULDER PAIN: ICD-10-CM

## 2019-12-27 DIAGNOSIS — E03.9 ACQUIRED HYPOTHYROIDISM: ICD-10-CM

## 2019-12-27 DIAGNOSIS — G89.29 CHRONIC LEFT SHOULDER PAIN: ICD-10-CM

## 2019-12-27 DIAGNOSIS — G63 NEUROPATHY ASSOCIATED WITH CANCER (HCC): ICD-10-CM

## 2019-12-27 DIAGNOSIS — Z00.00 MEDICARE ANNUAL WELLNESS VISIT, SUBSEQUENT: Primary | ICD-10-CM

## 2019-12-27 PROCEDURE — G0439 PPPS, SUBSEQ VISIT: HCPCS | Performed by: FAMILY MEDICINE

## 2019-12-27 RX ORDER — LIDOCAINE 50 MG/G
1 PATCH TOPICAL EVERY 24 HOURS
Qty: 90 EACH | Refills: 3 | Status: SHIPPED | OUTPATIENT
Start: 2019-12-27

## 2019-12-27 NOTE — PROGRESS NOTES
The ABCs of the Annual Wellness Visit  Subsequent Medicare Wellness Visit    Chief Complaint   Patient presents with   • Medicare Wellness-subsequent     Physical       Subjective   History of Present Illness:  Prashanth Prajapati is a 69 y.o. female who presents for a Subsequent Medicare Wellness Visit.     Diagnosed with small cell lung cancer since last visit. Undergoing chemo with hematology/oncology. Followed by pulmonary as well, COPD stable. She underwent lymph node biopsy by gen surg, still has a stitch on biopsy site that did not dissolve, sticking out. Has follow up scheduled.     Neuropathy stable and gabapentin continues to help.    On levothyroxine for acquired hypothyroidism. Dr. Cao is checking levels.     Hypertension controlled on current medicines.     On statin for hyperlipidemia.     On PPI for possible GERD related to chemo.    HEALTH RISK ASSESSMENT    Recent Hospitalizations:  Recently treated at the following:  Other: [facility] in [place]    Current Medical Providers:  Patient Care Team:  Hattie Askew MD as PCP - General (Family Medicine)  Bebe Owen MD as Surgeon (General Surgery)    Smoking Status:  Social History     Tobacco Use   Smoking Status Current Every Day Smoker   • Packs/day: 0.25   • Years: 50.00   • Pack years: 12.50   • Types: Cigarettes   Smokeless Tobacco Never Used       Alcohol Consumption:  Social History     Substance and Sexual Activity   Alcohol Use No   • Frequency: Never       Depression Screen:   PHQ-2/PHQ-9 Depression Screening 12/27/2019   Little interest or pleasure in doing things 0   Feeling down, depressed, or hopeless 0   Total Score 0       Fall Risk Screen:  JSADI Fall Risk Assessment was completed, and patient is at LOW risk for falls.Assessment completed on:12/27/2019    Health Habits and Functional and Cognitive Screening:  Functional & Cognitive Status 12/27/2019   Do you have difficulty preparing food and eating? No   Do you  have difficulty bathing yourself, getting dressed or grooming yourself? No   Do you have difficulty using the toilet? No   Do you have difficulty moving around from place to place? No   Do you have trouble with steps or getting out of a bed or a chair? No   Current Diet Well Balanced Diet   Dental Exam Up to date   Eye Exam Up to date   Exercise (times per week) 7 times per week   Current Exercise Activities Include Cardiovasular Workout on Exercise Equipment   Do you need help using the phone?  No   Are you deaf or do you have serious difficulty hearing?  No   Do you need help with transportation? No   Do you need help shopping? No   Do you need help preparing meals?  No   Do you need help with housework?  No   Do you need help with laundry? No   Do you need help taking your medications? No   Do you need help managing money? No   Do you ever drive or ride in a car without wearing a seat belt? No   Have you felt unusual stress, anger or loneliness in the last month? No   Who do you live with? Sibling   If you need help, do you have trouble finding someone available to you? No   Have you been bothered in the last four weeks by sexual problems? No   Do you have difficulty concentrating, remembering or making decisions? No         Does the patient have evidence of cognitive impairment? No    Asprin use counseling:Does not need ASA (and currently is not on it)    Age-appropriate Screening Schedule:  Refer to the list below for future screening recommendations based on patient's age, sex and/or medical conditions. Orders for these recommended tests are listed in the plan section. The patient has been provided with a written plan.    Health Maintenance   Topic Date Due   • LIPID PANEL  12/31/2019 (Originally 10/31/2019)   • MAMMOGRAM  12/01/2020 (Originally 1950)   • ZOSTER VACCINE (1 of 2) 01/04/2021 (Originally 11/16/2002)   • TDAP/TD VACCINES (1 - Tdap) 08/01/2029 (Originally 11/16/1963)   • PNEUMOCOCCAL VACCINES  (65+ LOW/MEDIUM RISK) (2 of 2 - PPSV23) 12/09/2020   • COLONOSCOPY  01/01/2023   • INFLUENZA VACCINE  Addressed          The following portions of the patient's history were reviewed and updated as appropriate: allergies, current medications, past family history, past medical history, past social history, past surgical history and problem list.    Outpatient Medications Prior to Visit   Medication Sig Dispense Refill   • acetaminophen (TYLENOL) 500 MG tablet Take 500 mg by mouth Every 6 (Six) Hours As Needed for Mild Pain .     • amLODIPine (NORVASC) 5 MG tablet Take 5 mg by mouth Daily.     • baclofen (LIORESAL) 10 MG tablet 10 mg QHS  1   • Calcium Carbonate-Vitamin D (CALCIUM-CARB 600 + D) 600-125 MG-UNIT tablet Take 500 mg by mouth 2 (Two) Times a Day.     • gabapentin (NEURONTIN) 300 MG capsule Take 1 capsule by mouth 3 (Three) Times a Day. 270 capsule 1   • HYDROcodone-acetaminophen (NORCO) 7.5-325 MG per tablet Take 1 tablet by mouth Every 4 (Four) Hours As Needed for Moderate Pain 10 tablet 0   • levothyroxine (SYNTHROID, LEVOTHROID) 125 MCG tablet Take 1 tablet by mouth Daily. 90 tablet 3   • lidocaine-prilocaine (EMLA) 2.5-2.5 % cream Apply  topically to the appropriate area as directed As Needed (45-60 minutes prior to port access.  Cover with saran/plastic wrap.). 30 g 3   • omeprazole (priLOSEC) 40 MG capsule Take 1 capsule by mouth Daily. 30 capsule 3   • ondansetron (ZOFRAN) 8 MG tablet Take 1 tablet by mouth 3 (Three) Times a Day As Needed for Nausea or Vomiting. 30 tablet 5   • pravastatin (PRAVACHOL) 40 MG tablet Take 40 mg by mouth Daily.     • tiotropium bromide-olodaterol (STIOLTO RESPIMAT) 2.5-2.5 MCG/ACT aerosol solution inhaler Inhale 2 puffs Daily. 1 inhaler 5   • traMADol (ULTRAM) 50 MG tablet Take 50 mg by mouth 3 (Three) Times a Day.  3   • diclofenac (VOLTAREN) 75 MG EC tablet Take 75 mg by mouth Daily As Needed.       No facility-administered medications prior to visit.        Patient  Active Problem List   Diagnosis   • Acquired hypothyroidism   • Centrilobular emphysema (CMS/HCC)   • Glucose intolerance   • Essential hypertension   • Mixed hyperlipidemia   • Lymphadenopathy, supraclavicular   • SCLC (small cell lung carcinoma) (CMS/HCC)       Advanced Care Planning:  has paperwork to complete but has not yet, I asked her to return to us or another [facility] office for scanning into system    Review of Systems   Constitutional: Positive for activity change.   Respiratory: Positive for shortness of breath (chronic and stable, inhalers help).    Cardiovascular: Negative for chest pain.   All other systems reviewed and are negative.      Compared to one year ago, the patient feels her physical health is worse.  Compared to one year ago, the patient feels her mental health is the same.    Reviewed chart for potential of high risk medication in the elderly: yes  Reviewed chart for potential of harmful drug interactions in the elderly:yes    Objective         Vitals:    12/27/19 1527   BP: 130/78   Pulse: 86   Resp: 18   Temp: 97.7 °F (36.5 °C)   TempSrc: Temporal   SpO2: 96%   Weight: 58.7 kg (129 lb 8 oz)       Body mass index is 23.69 kg/m².  Discussed the patient's BMI with her. The BMI is in the acceptable range.    Physical Exam   Constitutional: She is oriented to person, place, and time. Vital signs are normal. She appears well-developed and well-nourished.  Non-toxic appearance. She does not have a sickly appearance. She does not appear ill. No distress.   HENT:   Head: Normocephalic and atraumatic. Hair is normal.   Right Ear: Hearing, tympanic membrane, external ear and ear canal normal.   Left Ear: Hearing, tympanic membrane, external ear and ear canal normal.   Nose: Nose normal.   Mouth/Throat: Uvula is midline, oropharynx is clear and moist and mucous membranes are normal. No oropharyngeal exudate.   Eyes: Pupils are equal, round, and reactive to light. Conjunctivae, EOM and lids are  normal. Right eye exhibits no discharge. Left eye exhibits no discharge. No scleral icterus.   Neck: Normal range of motion and phonation normal. Neck supple.       Cardiovascular: Normal rate, regular rhythm, normal heart sounds and intact distal pulses. Exam reveals no gallop and no friction rub.   No murmur heard.  Pulmonary/Chest: Effort normal and breath sounds normal. No stridor. No respiratory distress. She has no wheezes. She has no rales. She exhibits no tenderness.   Abdominal: Soft. Bowel sounds are normal. She exhibits no distension and no mass. There is no tenderness. There is no rebound and no guarding.   Musculoskeletal: She exhibits no edema, tenderness or deformity.   Neurological: She is alert and oriented to person, place, and time. She displays no atrophy and no tremor. No cranial nerve deficit. She exhibits normal muscle tone. She displays no seizure activity. Gait normal.   Skin: Skin is warm. Capillary refill takes less than 2 seconds. No rash noted. She is not diaphoretic. No cyanosis or erythema. Nails show no clubbing.   Psychiatric: She has a normal mood and affect. Her speech is normal and behavior is normal. Judgment and thought content normal. Cognition and memory are normal.   Nursing note and vitals reviewed.            Assessment/Plan   Medicare Risks and Personalized Health Plan  CMS Preventative Services Quick Reference  Advance Directive Discussion  Chronic Pain     The above risks/problems have been discussed with the patient.  Pertinent information has been shared with the patient in the After Visit Summary.  Follow up plans and orders are seen below in the Assessment/Plan Section.    Diagnoses and all orders for this visit:    1. Medicare annual wellness visit, subsequent (Primary)    2. SCLC (small cell lung carcinoma) (CMS/[facility])  Comments:  Follow up with oncology, pulmonology    3. Neuropathy associated with cancer (CMS/[facility])  -     lidocaine (LIDODERM) 5 %; Place 1 patch on  the skin as directed by provider Daily. Remove & Discard patch within 12 hours or as directed by MD  Dispense: 90 each; Refill: 3    4. Centrilobular emphysema (CMS/HCC)  Comments:  follow up with pulmonology, continue inhalers.    5. Essential hypertension  Comments:  continue current medicine    6. Acquired hypothyroidism  Comments:  stable, oncology has checked levels    7. Chronic left shoulder pain  -     lidocaine (LIDODERM) 5 %; Place 1 patch on the skin as directed by provider Daily. Remove & Discard patch within 12 hours or as directed by MD  Dispense: 90 each; Refill: 3      Follow Up:  Return in about 1 year (around 12/29/2020) for Medicare Wellness or sooner if needed.     An After Visit Summary and PPPS were given to the patient.

## 2019-12-27 NOTE — PATIENT INSTRUCTIONS
Medicare Wellness  Personal Prevention Plan of Service     Date of Office Visit:  2019  Encounter Provider:  Hattie Askew MD  Place of Service:  [facility]  Patient Name: Prashanth Prajapati  :  1950    As part of the Medicare Wellness portion of your visit today, we are providing you with this personalized preventive plan of services (PPPS). This plan is based upon recommendations of the [place] Preventive Services Task Force (USPSTF) and the Advisory Committee on Immunization Practices (ACIP).    This lists the preventive care services that should be considered, and provides dates of when you are due. Items listed as completed are up-to-date and do not require any further intervention.    Health Maintenance   Topic Date Due   • HEPATITIS C SCREENING  10/31/2019   • LIPID PANEL  2019 (Originally 10/31/2019)   • MAMMOGRAM  2020 (Originally 1950)   • ZOSTER VACCINE (1 of 2) 2021 (Originally 2002)   • TDAP/TD VACCINES (1 - Tdap) 2029 (Originally 1963)   • PNEUMOCOCCAL VACCINES (65+ LOW/MEDIUM RISK) (2 of 2 - PPSV23) 2020   • MEDICARE ANNUAL WELLNESS  2020   • COLONOSCOPY  2023   • INFLUENZA VACCINE  Addressed       No orders of the defined types were placed in this encounter.      Return for Medicare Wellness or sooner if needed.

## 2020-01-03 ENCOUNTER — APPOINTMENT (OUTPATIENT)
Dept: GENERAL RADIOLOGY | Facility: HOSPITAL | Age: 70
End: 2020-01-03

## 2020-01-03 ENCOUNTER — ANESTHESIA (OUTPATIENT)
Dept: PERIOP | Facility: HOSPITAL | Age: 70
End: 2020-01-03

## 2020-01-03 ENCOUNTER — ANESTHESIA EVENT (OUTPATIENT)
Dept: PERIOP | Facility: HOSPITAL | Age: 70
End: 2020-01-03

## 2020-01-03 ENCOUNTER — HOSPITAL ENCOUNTER (OUTPATIENT)
Facility: HOSPITAL | Age: 70
Setting detail: HOSPITAL OUTPATIENT SURGERY
Discharge: HOME | End: 2020-01-03
Attending: SURGERY | Admitting: SURGERY

## 2020-01-03 VITALS
OXYGEN SATURATION: 98 % | RESPIRATION RATE: 16 BRPM | SYSTOLIC BLOOD PRESSURE: 144 MMHG | HEART RATE: 92 BPM | DIASTOLIC BLOOD PRESSURE: 75 MMHG | TEMPERATURE: 98.4 F

## 2020-01-03 DIAGNOSIS — C34.90 SCLC (SMALL CELL LUNG CARCINOMA) (HCC): ICD-10-CM

## 2020-01-03 PROCEDURE — 25010000003 LIDOCAINE 1 % SOLUTION: Performed by: SURGERY

## 2020-01-03 PROCEDURE — 71045 X-RAY EXAM CHEST 1 VIEW: CPT

## 2020-01-03 PROCEDURE — 25010000002 ONDANSETRON PER 1 MG: Performed by: NURSE ANESTHETIST, CERTIFIED REGISTERED

## 2020-01-03 PROCEDURE — 25010000002 DEXAMETHASONE PER 1 MG: Performed by: NURSE ANESTHETIST, CERTIFIED REGISTERED

## 2020-01-03 PROCEDURE — 36561 INSERT TUNNELED CV CATH: CPT | Performed by: SURGERY

## 2020-01-03 PROCEDURE — 25010000002 HEPARIN (PORCINE) PER 1000 UNITS: Performed by: SURGERY

## 2020-01-03 PROCEDURE — C1788 PORT, INDWELLING, IMP: HCPCS | Performed by: SURGERY

## 2020-01-03 PROCEDURE — 25010000002 PROPOFOL 10 MG/ML EMULSION: Performed by: NURSE ANESTHETIST, CERTIFIED REGISTERED

## 2020-01-03 PROCEDURE — 76000 FLUOROSCOPY <1 HR PHYS/QHP: CPT

## 2020-01-03 PROCEDURE — 77001 FLUOROGUIDE FOR VEIN DEVICE: CPT | Performed by: SURGERY

## 2020-01-03 PROCEDURE — 25010000002 METOCLOPRAMIDE PER 10 MG: Performed by: NURSE ANESTHETIST, CERTIFIED REGISTERED

## 2020-01-03 DEVICE — POWERPORT CLEARVUE IMPLANTABLE PORT WITH ATTACHABLE 8F POLYURETHANE OPEN-ENDED SINGLE-LUMEN VENOUS CATHETER INTERMEDIATE KIT
Type: IMPLANTABLE DEVICE | Site: SUBCLAVIAN | Status: FUNCTIONAL
Brand: POWERPORT CLEARVUE

## 2020-01-03 RX ORDER — HEPARIN SODIUM 5000 [USP'U]/ML
5000 INJECTION, SOLUTION INTRAVENOUS; SUBCUTANEOUS ONCE
Status: COMPLETED | OUTPATIENT
Start: 2020-01-03 | End: 2020-01-03

## 2020-01-03 RX ORDER — ONDANSETRON 2 MG/ML
4 INJECTION INTRAMUSCULAR; INTRAVENOUS ONCE AS NEEDED
Status: CANCELLED | OUTPATIENT
Start: 2020-01-03 | End: 2020-01-03

## 2020-01-03 RX ORDER — SODIUM CHLORIDE 0.9 % (FLUSH) 0.9 %
10 SYRINGE (ML) INJECTION AS NEEDED
Status: DISCONTINUED | OUTPATIENT
Start: 2020-01-03 | End: 2020-01-03 | Stop reason: HOSPADM

## 2020-01-03 RX ORDER — GLYCOPYRROLATE 0.2 MG/ML
INJECTION INTRAMUSCULAR; INTRAVENOUS AS NEEDED
Status: DISCONTINUED | OUTPATIENT
Start: 2020-01-03 | End: 2020-01-03 | Stop reason: SURG

## 2020-01-03 RX ORDER — CLINDAMYCIN PHOSPHATE 900 MG/50ML
900 INJECTION, SOLUTION INTRAVENOUS ONCE
Status: COMPLETED | OUTPATIENT
Start: 2020-01-03 | End: 2020-01-03

## 2020-01-03 RX ORDER — LIDOCAINE HYDROCHLORIDE 20 MG/ML
INJECTION, SOLUTION INTRAVENOUS AS NEEDED
Status: DISCONTINUED | OUTPATIENT
Start: 2020-01-03 | End: 2020-01-03 | Stop reason: SURG

## 2020-01-03 RX ORDER — SODIUM CHLORIDE 0.9 % (FLUSH) 0.9 %
3 SYRINGE (ML) INJECTION EVERY 12 HOURS SCHEDULED
Status: DISCONTINUED | OUTPATIENT
Start: 2020-01-03 | End: 2020-01-03 | Stop reason: HOSPADM

## 2020-01-03 RX ORDER — HYDROCODONE BITARTRATE AND ACETAMINOPHEN 7.5; 325 MG/1; MG/1
1 TABLET ORAL EVERY 4 HOURS PRN
Qty: 10 TABLET | Refills: 0 | Status: SHIPPED | OUTPATIENT
Start: 2020-01-03 | End: 2020-06-11

## 2020-01-03 RX ORDER — DEXAMETHASONE SODIUM PHOSPHATE 4 MG/ML
INJECTION, SOLUTION INTRA-ARTICULAR; INTRALESIONAL; INTRAMUSCULAR; INTRAVENOUS; SOFT TISSUE AS NEEDED
Status: DISCONTINUED | OUTPATIENT
Start: 2020-01-03 | End: 2020-01-03 | Stop reason: SURG

## 2020-01-03 RX ORDER — HEPARIN SODIUM 1000 [USP'U]/ML
INJECTION, SOLUTION INTRAVENOUS; SUBCUTANEOUS AS NEEDED
Status: DISCONTINUED | OUTPATIENT
Start: 2020-01-03 | End: 2020-01-03 | Stop reason: HOSPADM

## 2020-01-03 RX ORDER — MAGNESIUM HYDROXIDE 1200 MG/15ML
LIQUID ORAL AS NEEDED
Status: DISCONTINUED | OUTPATIENT
Start: 2020-01-03 | End: 2020-01-03 | Stop reason: HOSPADM

## 2020-01-03 RX ORDER — BUPIVACAINE HYDROCHLORIDE 2.5 MG/ML
INJECTION, SOLUTION EPIDURAL; INFILTRATION; INTRACAUDAL AS NEEDED
Status: DISCONTINUED | OUTPATIENT
Start: 2020-01-03 | End: 2020-01-03 | Stop reason: HOSPADM

## 2020-01-03 RX ORDER — KETAMINE HCL IN NACL, ISO-OSM 100MG/10ML
SYRINGE (ML) INJECTION AS NEEDED
Status: DISCONTINUED | OUTPATIENT
Start: 2020-01-03 | End: 2020-01-03 | Stop reason: SURG

## 2020-01-03 RX ORDER — ONDANSETRON 2 MG/ML
4 INJECTION INTRAMUSCULAR; INTRAVENOUS ONCE AS NEEDED
Status: CANCELLED | OUTPATIENT
Start: 2020-01-03

## 2020-01-03 RX ORDER — LIDOCAINE HYDROCHLORIDE 10 MG/ML
INJECTION, SOLUTION INFILTRATION; PERINEURAL AS NEEDED
Status: DISCONTINUED | OUTPATIENT
Start: 2020-01-03 | End: 2020-01-03 | Stop reason: HOSPADM

## 2020-01-03 RX ORDER — PROPOFOL 10 MG/ML
VIAL (ML) INTRAVENOUS AS NEEDED
Status: DISCONTINUED | OUTPATIENT
Start: 2020-01-03 | End: 2020-01-03 | Stop reason: SURG

## 2020-01-03 RX ORDER — ONDANSETRON 2 MG/ML
INJECTION INTRAMUSCULAR; INTRAVENOUS AS NEEDED
Status: DISCONTINUED | OUTPATIENT
Start: 2020-01-03 | End: 2020-01-03 | Stop reason: SURG

## 2020-01-03 RX ORDER — METOCLOPRAMIDE HYDROCHLORIDE 5 MG/ML
INJECTION INTRAMUSCULAR; INTRAVENOUS AS NEEDED
Status: DISCONTINUED | OUTPATIENT
Start: 2020-01-03 | End: 2020-01-03 | Stop reason: SURG

## 2020-01-03 RX ORDER — HYDROCODONE BITARTRATE AND ACETAMINOPHEN 7.5; 325 MG/1; MG/1
1 TABLET ORAL ONCE AS NEEDED
Status: CANCELLED | OUTPATIENT
Start: 2020-01-03 | End: 2020-01-13

## 2020-01-03 RX ORDER — SODIUM CHLORIDE, SODIUM LACTATE, POTASSIUM CHLORIDE, CALCIUM CHLORIDE 600; 310; 30; 20 MG/100ML; MG/100ML; MG/100ML; MG/100ML
50 INJECTION, SOLUTION INTRAVENOUS CONTINUOUS
Status: DISCONTINUED | OUTPATIENT
Start: 2020-01-03 | End: 2020-01-03 | Stop reason: HOSPADM

## 2020-01-03 RX ORDER — PROMETHAZINE HYDROCHLORIDE 25 MG/ML
12.5 INJECTION, SOLUTION INTRAMUSCULAR; INTRAVENOUS ONCE AS NEEDED
Status: CANCELLED | OUTPATIENT
Start: 2020-01-03

## 2020-01-03 RX ADMIN — SODIUM CHLORIDE, POTASSIUM CHLORIDE, SODIUM LACTATE AND CALCIUM CHLORIDE 50 ML/HR: 600; 310; 30; 20 INJECTION, SOLUTION INTRAVENOUS at 10:35

## 2020-01-03 RX ADMIN — DEXAMETHASONE SODIUM PHOSPHATE 10 MG: 4 INJECTION, SOLUTION INTRAMUSCULAR; INTRAVENOUS at 13:00

## 2020-01-03 RX ADMIN — PROPOFOL 200 MG: 10 INJECTION, EMULSION INTRAVENOUS at 13:39

## 2020-01-03 RX ADMIN — LIDOCAINE HYDROCHLORIDE 100 MG: 20 INJECTION, SOLUTION INTRAVENOUS at 13:00

## 2020-01-03 RX ADMIN — GLYCOPYRROLATE 0.2 MG: 0.2 INJECTION, SOLUTION INTRAMUSCULAR; INTRAVENOUS at 13:00

## 2020-01-03 RX ADMIN — HEPARIN SODIUM 5000 UNITS: 5000 INJECTION, SOLUTION INTRAVENOUS; SUBCUTANEOUS at 12:51

## 2020-01-03 RX ADMIN — CLINDAMYCIN PHOSPHATE 900 MG: 900 INJECTION, SOLUTION INTRAVENOUS at 12:55

## 2020-01-03 RX ADMIN — ONDANSETRON 4 MG: 2 INJECTION INTRAMUSCULAR; INTRAVENOUS at 13:00

## 2020-01-03 RX ADMIN — METOCLOPRAMIDE 10 MG: 5 INJECTION, SOLUTION INTRAMUSCULAR; INTRAVENOUS at 13:00

## 2020-01-03 RX ADMIN — Medication 50 MG: at 13:00

## 2020-01-03 NOTE — INTERVAL H&P NOTE
H&P updated. The patient was examined and the following changes are noted:  Patient here for port insertion for chemotherapy.  She has been counseled regarding the risks, benefits and alternatives to the procedure.  She understands and is willing to proceed.      Her medical history is unchanged and is documented below.     Patient Active Problem List   Diagnosis   • Acquired hypothyroidism   • Centrilobular emphysema (CMS/HCC)   • Glucose intolerance   • Essential hypertension   • Mixed hyperlipidemia   • Lymphadenopathy, supraclavicular   • SCLC (small cell lung carcinoma) (CMS/HCC)     Past Medical History:   Diagnosis Date   • Arthritis    • Body piercing     both ears   • Colitis    • COPD (chronic obstructive pulmonary disease) (CMS/HCC)    • Diabetes mellitus (CMS/HCC)    • Elevated cholesterol    • Full dentures    • Hyperlipidemia    • Hypertension    • Hypothyroidism    • PONV (postoperative nausea and vomiting)    • Wears glasses     for dentures     Past Surgical History:   Procedure Laterality Date   • CARPAL TUNNEL RELEASE Left    • CERVICAL LYMPH NODE BIOPSY/EXCISION Left 12/2/2019    Procedure: SUPRACLAVICULAR LYMPH NODE BIOPSY/EXCISION;  Surgeon: Bebe Owen MD;  Location: [facility];  Service: General   • COLONOSCOPY  2014   • HAND SURGERY  02/2018   • KNEE ARTHROSCOPY     • LAPAROSCOPIC TUBAL LIGATION     • MOUTH SURGERY      full extraction of teeth       Family History   Problem Relation Age of Onset   • Arthritis Mother    • Hypertension Mother    • Hyperlipidemia Mother    • Lung cancer Father    • Cancer Father    • Diabetes Sister    • Arthritis Maternal Grandmother    • Stroke Maternal Grandmother    • Diabetes Brother        Social History     Socioeconomic History   • Marital status:      Spouse name: Not on file   • Number of children: Not on file   • Years of education: Not on file   • Highest education level: Not on file   Tobacco Use   • Smoking status: Current Every  Day Smoker     Packs/day: 0.25     Years: 50.00     Pack years: 12.50     Types: Cigarettes   • Smokeless tobacco: Never Used   Substance and Sexual Activity   • Alcohol use: No     Frequency: Never   • Drug use: No   • Sexual activity: Defer       No current facility-administered medications on file prior to encounter.      Current Outpatient Medications on File Prior to Encounter   Medication Sig   • amLODIPine (NORVASC) 5 MG tablet Take 5 mg by mouth Daily.   • baclofen (LIORESAL) 10 MG tablet 10 mg QHS   • Calcium Carbonate-Vitamin D (CALCIUM-CARB 600 + D) 600-125 MG-UNIT tablet Take 500 mg by mouth 2 (Two) Times a Day.   • gabapentin (NEURONTIN) 300 MG capsule Take 1 capsule by mouth 3 (Three) Times a Day.   • HYDROcodone-acetaminophen (NORCO) 7.5-325 MG per tablet Take 1 tablet by mouth Every 4 (Four) Hours As Needed for Moderate Pain   • levothyroxine (SYNTHROID, LEVOTHROID) 125 MCG tablet Take 1 tablet by mouth Daily.   • omeprazole (priLOSEC) 40 MG capsule Take 1 capsule by mouth Daily.   • ondansetron (ZOFRAN) 8 MG tablet Take 1 tablet by mouth 3 (Three) Times a Day As Needed for Nausea or Vomiting.   • pravastatin (PRAVACHOL) 40 MG tablet Take 40 mg by mouth Daily.   • tiotropium bromide-olodaterol (STIOLTO RESPIMAT) 2.5-2.5 MCG/ACT aerosol solution inhaler Inhale 2 puffs Daily.   • traMADol (ULTRAM) 50 MG tablet Take 50 mg by mouth 3 (Three) Times a Day.   • acetaminophen (TYLENOL) 500 MG tablet Take 500 mg by mouth Every 6 (Six) Hours As Needed for Mild Pain .   • diclofenac (VOLTAREN) 75 MG EC tablet Take 75 mg by mouth Daily As Needed.   • lidocaine-prilocaine (EMLA) 2.5-2.5 % cream Apply  topically to the appropriate area as directed As Needed (45-60 minutes prior to port access.  Cover with saran/plastic wrap.).     Allergies   Allergen Reactions   • Erythromycin Itching   • Penicillins Other (See Comments)     Report unknown reaction in childhood. Has taken ampicillin without difficulty.

## 2020-01-03 NOTE — ANESTHESIA POSTPROCEDURE EVALUATION
Patient: Prashanth Prajapati    Procedure Summary     Date:  01/03/20 [facility]    Anesthesia Start:  1255 Anesthesia Stop:  1352    Procedure:  INSERTION OF PORTACATH WITH ULTRASOUND AND FLUOROSCOPIC GUIDANCE (N/A ) Diagnosis:       SCLC (small cell lung carcinoma) (CMS/HCC)      (SCLC (small cell lung carcinoma) (CMS/HCC) [C34.90])    Surgeon:  Bebe Owen MD Provider:  Berlin Ruiz CRNA    Anesthesia Type:  MAC ASA Status:  3          Anesthesia Type: MAC    Vitals  Vitals Value Taken Time   /60 1/3/2020  1:56 PM   Temp 98.4 °F (36.9 °C) 1/3/2020  1:56 PM   Pulse 75 1/3/2020  1:56 PM   Resp 16 1/3/2020  1:56 PM   SpO2 98 % 1/3/2020  1:56 PM           Post Anesthesia Care and Evaluation    Patient location during evaluation: PHASE II  Patient participation: complete - patient participated  Level of consciousness: awake  Pain score: 1  Pain management: adequate  Airway patency: patent  Anesthetic complications: No anesthetic complications  PONV Status: controlled  Cardiovascular status: acceptable and stable  Respiratory status: acceptable  Hydration status: acceptable

## 2020-01-03 NOTE — ANESTHESIA PREPROCEDURE EVALUATION
Anesthesia Evaluation     Patient summary reviewed and Nursing notes reviewed   history of anesthetic complications: PONV  NPO Solid Status: > 8 hours  NPO Liquid Status: > 8 hours           Airway   Mallampati: II  TM distance: >3 FB  Neck ROM: full  no difficulty expected  Dental - normal exam     Pulmonary - normal exam   (+) lung cancer, COPD moderate,   Cardiovascular - normal exam    Rhythm: regular  Rate: normal    (+) hypertension, hyperlipidemia,       Neuro/Psych- negative ROS  GI/Hepatic/Renal/Endo    (+)   diabetes mellitus type 2,     Musculoskeletal     Abdominal    Substance History - negative use     OB/GYN negative ob/gyn ROS         Other   arthritis,    history of cancer active    ROS/Med Hx Other: Lung cancer.                  Anesthesia Plan    ASA 3     MAC   (Pt told that intravenous sedation will be used as the primary anesthetic along with local anesthesia if necessary. Every effort will be made to make sure the patient is comfortable.     The patient was told they may or may not have recall for the procedure. It was further explained that if the MAC was not adequate that a general anesthetic with either an LMA or endotracheal tube would be required.     Will proceed with the plan of care.)  intravenous induction     Anesthetic plan, all risks, benefits, and alternatives have been provided, discussed and informed consent has been obtained with: patient.

## 2020-01-06 ENCOUNTER — TELEPHONE (OUTPATIENT)
Dept: SOCIAL WORK | Facility: HOSPITAL | Age: 70
End: 2020-01-06

## 2020-01-06 NOTE — TELEPHONE ENCOUNTER
SW received a phone call from pt asking for assistance with financial concerns.  Pt is worried about not having received anything in the mail about her secondary insurance, for which she has applied.  SW informed her of the financial assistance program available at [facility].  SW reassured pt that it is the first of the new year, so there may be a slight delay in getting her documents in the mail.  SW encouraged pt to call with any future needs or concerns.

## 2020-01-08 ENCOUNTER — INFUSION (OUTPATIENT)
Dept: ONCOLOGY | Facility: HOSPITAL | Age: 70
End: 2020-01-08

## 2020-01-08 ENCOUNTER — OFFICE VISIT (OUTPATIENT)
Dept: ONCOLOGY | Facility: CLINIC | Age: 70
End: 2020-01-08

## 2020-01-08 VITALS
TEMPERATURE: 97.7 F | HEART RATE: 94 BPM | BODY MASS INDEX: 23.55 KG/M2 | DIASTOLIC BLOOD PRESSURE: 69 MMHG | HEIGHT: 62 IN | WEIGHT: 128 LBS | SYSTOLIC BLOOD PRESSURE: 155 MMHG | RESPIRATION RATE: 16 BRPM | OXYGEN SATURATION: 93 %

## 2020-01-08 DIAGNOSIS — C34.90 SCLC (SMALL CELL LUNG CARCINOMA) (HCC): Primary | ICD-10-CM

## 2020-01-08 LAB
ALBUMIN SERPL-MCNC: 4 G/DL (ref 3.5–5.2)
ALBUMIN/GLOB SERPL: 1 G/DL
ALP SERPL-CCNC: 93 U/L (ref 39–117)
ALT SERPL W P-5'-P-CCNC: 10 U/L (ref 1–33)
ANION GAP SERPL CALCULATED.3IONS-SCNC: 11.8 MMOL/L (ref 5–15)
AST SERPL-CCNC: 14 U/L (ref 1–32)
BASOPHILS # BLD AUTO: 0.05 10*3/MM3 (ref 0–0.2)
BASOPHILS NFR BLD AUTO: 0.6 % (ref 0–1.5)
BILIRUB SERPL-MCNC: 0.2 MG/DL (ref 0.2–1.2)
BUN BLD-MCNC: 7 MG/DL (ref 8–23)
BUN/CREAT SERPL: 10.3 (ref 7–25)
CALCIUM SPEC-SCNC: 9.3 MG/DL (ref 8.6–10.5)
CHLORIDE SERPL-SCNC: 95 MMOL/L (ref 98–107)
CO2 SERPL-SCNC: 23.2 MMOL/L (ref 22–29)
CREAT BLD-MCNC: 0.68 MG/DL (ref 0.57–1)
DEPRECATED RDW RBC AUTO: 47.1 FL (ref 37–54)
EOSINOPHIL # BLD AUTO: 0.08 10*3/MM3 (ref 0–0.4)
EOSINOPHIL NFR BLD AUTO: 0.9 % (ref 0.3–6.2)
ERYTHROCYTE [DISTWIDTH] IN BLOOD BY AUTOMATED COUNT: 14.7 % (ref 12.3–15.4)
GFR SERPL CREATININE-BSD FRML MDRD: 86 ML/MIN/1.73
GLOBULIN UR ELPH-MCNC: 3.9 GM/DL
GLUCOSE BLD-MCNC: 134 MG/DL (ref 65–99)
HCT VFR BLD AUTO: 33.6 % (ref 34–46.6)
HGB BLD-MCNC: 11 G/DL (ref 12–15.9)
IMM GRANULOCYTES # BLD AUTO: 0.08 10*3/MM3 (ref 0–0.05)
IMM GRANULOCYTES NFR BLD AUTO: 0.9 % (ref 0–0.5)
LYMPHOCYTES # BLD AUTO: 1.67 10*3/MM3 (ref 0.7–3.1)
LYMPHOCYTES NFR BLD AUTO: 19.1 % (ref 19.6–45.3)
MCH RBC QN AUTO: 29.8 PG (ref 26.6–33)
MCHC RBC AUTO-ENTMCNC: 32.7 G/DL (ref 31.5–35.7)
MCV RBC AUTO: 91.1 FL (ref 79–97)
MONOCYTES # BLD AUTO: 1.89 10*3/MM3 (ref 0.1–0.9)
MONOCYTES NFR BLD AUTO: 21.6 % (ref 5–12)
NEUTROPHILS # BLD AUTO: 4.99 10*3/MM3 (ref 1.7–7)
NEUTROPHILS NFR BLD AUTO: 56.9 % (ref 42.7–76)
NRBC BLD AUTO-RTO: 0 /100 WBC (ref 0–0.2)
PLATELET # BLD AUTO: 324 10*3/MM3 (ref 140–450)
PMV BLD AUTO: 8.4 FL (ref 6–12)
POTASSIUM BLD-SCNC: 4.2 MMOL/L (ref 3.5–5.2)
PROT SERPL-MCNC: 7.9 G/DL (ref 6–8.5)
RBC # BLD AUTO: 3.69 10*6/MM3 (ref 3.77–5.28)
SODIUM BLD-SCNC: 130 MMOL/L (ref 136–145)
WBC NRBC COR # BLD: 8.76 10*3/MM3 (ref 3.4–10.8)

## 2020-01-08 PROCEDURE — 85025 COMPLETE CBC W/AUTO DIFF WBC: CPT

## 2020-01-08 PROCEDURE — 99215 OFFICE O/P EST HI 40 MIN: CPT | Performed by: INTERNAL MEDICINE

## 2020-01-08 PROCEDURE — 25010000002 DEXAMETHASONE PER 1 MG: Performed by: INTERNAL MEDICINE

## 2020-01-08 PROCEDURE — 96375 TX/PRO/DX INJ NEW DRUG ADDON: CPT

## 2020-01-08 PROCEDURE — 96366 THER/PROPH/DIAG IV INF ADDON: CPT

## 2020-01-08 PROCEDURE — 80053 COMPREHEN METABOLIC PANEL: CPT

## 2020-01-08 PROCEDURE — 25010000002 CARBOPLATIN PER 50 MG: Performed by: INTERNAL MEDICINE

## 2020-01-08 PROCEDURE — 25010000002 ATEZOLIZUMAB 1200 MG/20ML SOLUTION 20 ML VIAL: Performed by: INTERNAL MEDICINE

## 2020-01-08 PROCEDURE — 96413 CHEMO IV INFUSION 1 HR: CPT

## 2020-01-08 PROCEDURE — 25010000002 FOSAPREPITANT PER 1 MG: Performed by: INTERNAL MEDICINE

## 2020-01-08 PROCEDURE — 96367 TX/PROPH/DG ADDL SEQ IV INF: CPT

## 2020-01-08 PROCEDURE — 25010000002 PALONOSETRON 0.25 MG/5ML SOLUTION PREFILLED SYRINGE: Performed by: INTERNAL MEDICINE

## 2020-01-08 PROCEDURE — 85007 BL SMEAR W/DIFF WBC COUNT: CPT

## 2020-01-08 PROCEDURE — 36415 COLL VENOUS BLD VENIPUNCTURE: CPT

## 2020-01-08 PROCEDURE — 96417 CHEMO IV INFUS EACH ADDL SEQ: CPT

## 2020-01-08 PROCEDURE — 25010000002 ETOPOSIDE 500 MG/25ML SOLUTION 25 ML VIAL: Performed by: INTERNAL MEDICINE

## 2020-01-08 RX ORDER — SODIUM CHLORIDE 9 MG/ML
250 INJECTION, SOLUTION INTRAVENOUS ONCE
Status: CANCELLED | OUTPATIENT
Start: 2020-01-08

## 2020-01-08 RX ORDER — SODIUM CHLORIDE 9 MG/ML
250 INJECTION, SOLUTION INTRAVENOUS ONCE
Status: CANCELLED | OUTPATIENT
Start: 2020-01-09

## 2020-01-08 RX ORDER — DIPHENHYDRAMINE HYDROCHLORIDE 50 MG/ML
50 INJECTION INTRAMUSCULAR; INTRAVENOUS AS NEEDED
Status: CANCELLED | OUTPATIENT
Start: 2020-01-08

## 2020-01-08 RX ORDER — FAMOTIDINE 10 MG/ML
20 INJECTION, SOLUTION INTRAVENOUS AS NEEDED
Status: CANCELLED | OUTPATIENT
Start: 2020-01-08

## 2020-01-08 RX ORDER — SODIUM CHLORIDE 9 MG/ML
250 INJECTION, SOLUTION INTRAVENOUS ONCE
Status: DISCONTINUED | OUTPATIENT
Start: 2020-01-08 | End: 2020-01-08 | Stop reason: HOSPADM

## 2020-01-08 RX ORDER — PALONOSETRON 0.05 MG/ML
0.25 INJECTION, SOLUTION INTRAVENOUS ONCE
Status: COMPLETED | OUTPATIENT
Start: 2020-01-08 | End: 2020-01-08

## 2020-01-08 RX ORDER — PROCHLORPERAZINE MALEATE 10 MG
10 TABLET ORAL ONCE
Status: CANCELLED | OUTPATIENT
Start: 2020-01-10

## 2020-01-08 RX ORDER — PROCHLORPERAZINE MALEATE 10 MG
10 TABLET ORAL ONCE
Status: CANCELLED | OUTPATIENT
Start: 2020-01-09

## 2020-01-08 RX ORDER — PALONOSETRON 0.05 MG/ML
0.25 INJECTION, SOLUTION INTRAVENOUS ONCE
Status: CANCELLED | OUTPATIENT
Start: 2020-01-08

## 2020-01-08 RX ORDER — SODIUM CHLORIDE 0.9 % (FLUSH) 0.9 %
10 SYRINGE (ML) INJECTION AS NEEDED
Status: CANCELLED | OUTPATIENT
Start: 2020-01-08

## 2020-01-08 RX ORDER — SODIUM CHLORIDE 0.9 % (FLUSH) 0.9 %
10 SYRINGE (ML) INJECTION AS NEEDED
Status: DISCONTINUED | OUTPATIENT
Start: 2020-01-08 | End: 2020-01-08 | Stop reason: HOSPADM

## 2020-01-08 RX ORDER — SODIUM CHLORIDE 9 MG/ML
250 INJECTION, SOLUTION INTRAVENOUS ONCE
Status: CANCELLED | OUTPATIENT
Start: 2020-01-10

## 2020-01-08 RX ADMIN — HEPARIN 500 UNITS: 100 SYRINGE at 13:09

## 2020-01-08 RX ADMIN — SODIUM CHLORIDE 150 MG: 9 INJECTION, SOLUTION INTRAVENOUS at 10:50

## 2020-01-08 RX ADMIN — SODIUM CHLORIDE, PRESERVATIVE FREE 10 ML: 5 INJECTION INTRAVENOUS at 13:09

## 2020-01-08 RX ADMIN — CARBOPLATIN 490 MG: 10 INJECTION, SOLUTION INTRAVENOUS at 11:29

## 2020-01-08 RX ADMIN — ATEZOLIZUMAB 1200 MG: 1200 INJECTION, SOLUTION INTRAVENOUS at 10:18

## 2020-01-08 RX ADMIN — ETOPOSIDE 160 MG: 20 INJECTION, SOLUTION INTRAVENOUS at 12:07

## 2020-01-08 RX ADMIN — DEXAMETHASONE SODIUM PHOSPHATE 12 MG: 4 INJECTION, SOLUTION INTRAMUSCULAR; INTRAVENOUS at 11:13

## 2020-01-08 RX ADMIN — PALONOSETRON 0.25 MG: 0.25 INJECTION, SOLUTION INTRAVENOUS at 10:17

## 2020-01-08 NOTE — PROGRESS NOTES
DATE OF VISIT: 1/8/2020    REASON FOR VISIT: Followup for extensive stage small cell lung cancer     HISTORY OF PRESENT ILLNESS: The patient is a very pleasant 69 y.o. female  with past medical history significant for extensive stage small cell lung cancer diagnosed December 2, 2019.  She was started on palliative treatment with carboplatin and etoposide and Tecentriq December 15, 2019. The patient is here today for follow-up visit with treatment cycle #2.    SUBJECTIVE: The patient has been doing fairly well. she was able to tolerate  her treatment without any serious side effects. she denied any fever or  chills, no night sweats, denied any headaches.  She had nausea but no vomiting.    PAST MEDICAL HISTORY/SOCIAL HISTORY/FAMILY HISTORY: Reviewed by me and unchanged from my documentation done on 01/08/20.    Review of Systems   Constitutional: Negative for activity change, appetite change, chills, fatigue, fever and unexpected weight change.   HENT: Negative for hearing loss, mouth sores, nosebleeds, sore throat and trouble swallowing.    Eyes: Negative for visual disturbance.   Respiratory: Negative for cough, chest tightness, shortness of breath and wheezing.    Cardiovascular: Negative for chest pain, palpitations and leg swelling.   Gastrointestinal: Positive for nausea. Negative for abdominal distention, abdominal pain, blood in stool, constipation, diarrhea, rectal pain and vomiting.   Endocrine: Negative for cold intolerance and heat intolerance.   Genitourinary: Negative for difficulty urinating, dysuria, frequency and urgency.   Musculoskeletal: Negative for arthralgias, back pain, gait problem, joint swelling and myalgias.   Skin: Negative for rash.   Neurological: Negative for dizziness, tremors, syncope, weakness, light-headedness, numbness and headaches.   Hematological: Negative for adenopathy. Does not bruise/bleed easily.   Psychiatric/Behavioral: Negative for confusion, sleep disturbance and  suicidal ideas. The patient is not nervous/anxious.          Current Outpatient Medications:   •  acetaminophen (TYLENOL) 500 MG tablet, Take 500 mg by mouth Every 6 (Six) Hours As Needed for Mild Pain ., Disp: , Rfl:   •  amLODIPine (NORVASC) 5 MG tablet, Take 5 mg by mouth Daily., Disp: , Rfl:   •  baclofen (LIORESAL) 10 MG tablet, 10 mg QHS, Disp: , Rfl: 1  •  Calcium Carbonate-Vitamin D (CALCIUM-CARB 600 + D) 600-125 MG-UNIT tablet, Take 500 mg by mouth 2 (Two) Times a Day., Disp: , Rfl:   •  diclofenac (VOLTAREN) 75 MG EC tablet, Take 75 mg by mouth Daily As Needed., Disp: , Rfl:   •  gabapentin (NEURONTIN) 300 MG capsule, Take 1 capsule by mouth 3 (Three) Times a Day., Disp: 270 capsule, Rfl: 1  •  HYDROcodone-acetaminophen (NORCO) 7.5-325 MG per tablet, Take 1 tablet by mouth Every 4 (Four) Hours As Needed for Moderate Pain, Disp: 10 tablet, Rfl: 0  •  levothyroxine (SYNTHROID, LEVOTHROID) 125 MCG tablet, Take 1 tablet by mouth Daily., Disp: 90 tablet, Rfl: 3  •  lidocaine (LIDODERM) 5 %, Place 1 patch on the skin as directed by provider Daily. Remove & Discard patch within 12 hours or as directed by MD, Disp: 90 each, Rfl: 3  •  lidocaine-prilocaine (EMLA) 2.5-2.5 % cream, Apply  topically to the appropriate area as directed As Needed (45-60 minutes prior to port access.  Cover with saran/plastic wrap.)., Disp: 30 g, Rfl: 3  •  omeprazole (priLOSEC) 40 MG capsule, Take 1 capsule by mouth Daily., Disp: 30 capsule, Rfl: 3  •  ondansetron (ZOFRAN) 8 MG tablet, Take 1 tablet by mouth 3 (Three) Times a Day As Needed for Nausea or Vomiting., Disp: 30 tablet, Rfl: 5  •  pravastatin (PRAVACHOL) 40 MG tablet, Take 40 mg by mouth Daily., Disp: , Rfl:   •  tiotropium bromide-olodaterol (STIOLTO RESPIMAT) 2.5-2.5 MCG/ACT aerosol solution inhaler, Inhale 2 puffs Daily., Disp: 1 inhaler, Rfl: 5  •  traMADol (ULTRAM) 50 MG tablet, Take 50 mg by mouth 3 (Three) Times a Day., Disp: , Rfl: 3    PHYSICAL EXAMINATION:   Temp  "97.7 °F (36.5 °C) (Temporal)   Resp 16   Ht 157.5 cm (62\")   Wt 58.1 kg (128 lb)   BMI 23.41 kg/m²    ECOG Performance Status: 1 - Symptomatic but completely ambulatory  General Appearance:  alert, cooperative, no apparent distress and appears stated age   Neurologic/Psychiatric: A&O x 3, gait steady, appropriate affect, strength 5/5 in all muscle groups   HEENT:  Normocephalic, without obvious abnormality, mucous membranes moist   Neck: Supple, symmetrical, trachea midline, no adenopathy;  No thyromegaly, masses, or tenderness   Lungs:   Clear to auscultation bilaterally; respirations regular, even, and unlabored bilaterally   Heart:  Regular rate and rhythm, no murmurs appreciated   Abdomen:   Soft, non-tender, non-distended and no organomegaly   Lymph nodes: No cervical, supraclavicular, inguinal or axillary adenopathy noted   Extremities: Normal, atraumatic; no clubbing, cyanosis, or edema    Skin: No rashes, ulcers, or suspicious lesions noted     No visits with results within 2 Week(s) from this visit.   Latest known visit with results is:   Infusion on 12/17/2019   Component Date Value Ref Range Status   • Glucose 12/17/2019 119* 65 - 99 mg/dL Final   • BUN 12/17/2019 10  8 - 23 mg/dL Final   • Creatinine 12/17/2019 0.69  0.57 - 1.00 mg/dL Final   • Sodium 12/17/2019 131* 136 - 145 mmol/L Final   • Potassium 12/17/2019 4.3  3.5 - 5.2 mmol/L Final   • Chloride 12/17/2019 94* 98 - 107 mmol/L Final   • CO2 12/17/2019 22.0  22.0 - 29.0 mmol/L Final   • Calcium 12/17/2019 9.5  8.6 - 10.5 mg/dL Final   • Total Protein 12/17/2019 8.3  6.0 - 8.5 g/dL Final   • Albumin 12/17/2019 3.80  3.50 - 5.20 g/dL Final   • ALT (SGPT) 12/17/2019 8  1 - 33 U/L Final   • AST (SGOT) 12/17/2019 14  1 - 32 U/L Final   • Alkaline Phosphatase 12/17/2019 93  39 - 117 U/L Final   • Total Bilirubin 12/17/2019 0.3  0.2 - 1.2 mg/dL Final   • eGFR Non African Amer 12/17/2019 85  >60 mL/min/1.73 Final   • Globulin 12/17/2019 4.5  gm/dL " Final   • A/G Ratio 12/17/2019 0.8  g/dL Final   • BUN/Creatinine Ratio 12/17/2019 14.5  7.0 - 25.0 Final   • Anion Gap 12/17/2019 15.0  5.0 - 15.0 mmol/L Final   • TSH 12/17/2019 0.556  0.270 - 4.200 uIU/mL Final   • T3, Free 12/17/2019 2.88  2.00 - 4.40 pg/mL Final   • Free T4 12/17/2019 1.69  0.93 - 1.70 ng/dL Final   • WBC 12/17/2019 7.88  3.40 - 10.80 10*3/mm3 Final   • RBC 12/17/2019 4.13  3.77 - 5.28 10*6/mm3 Final   • Hemoglobin 12/17/2019 12.1  12.0 - 15.9 g/dL Final   • Hematocrit 12/17/2019 37.2  34.0 - 46.6 % Final   • MCV 12/17/2019 90.1  79.0 - 97.0 fL Final   • MCH 12/17/2019 29.3  26.6 - 33.0 pg Final   • MCHC 12/17/2019 32.5  31.5 - 35.7 g/dL Final   • RDW 12/17/2019 13.4  12.3 - 15.4 % Final   • RDW-SD 12/17/2019 44.0  37.0 - 54.0 fl Final   • MPV 12/17/2019 8.8  6.0 - 12.0 fL Final   • Platelets 12/17/2019 338  140 - 450 10*3/mm3 Final   • Neutrophil % 12/17/2019 56.6  42.7 - 76.0 % Final   • Lymphocyte % 12/17/2019 23.4  19.6 - 45.3 % Final   • Monocyte % 12/17/2019 14.7* 5.0 - 12.0 % Final   • Eosinophil % 12/17/2019 4.1  0.3 - 6.2 % Final   • Basophil % 12/17/2019 0.8  0.0 - 1.5 % Final   • Immature Grans % 12/17/2019 0.4  0.0 - 0.5 % Final   • Neutrophils, Absolute 12/17/2019 4.47  1.70 - 7.00 10*3/mm3 Final   • Lymphocytes, Absolute 12/17/2019 1.84  0.70 - 3.10 10*3/mm3 Final   • Monocytes, Absolute 12/17/2019 1.16* 0.10 - 0.90 10*3/mm3 Final   • Eosinophils, Absolute 12/17/2019 0.32  0.00 - 0.40 10*3/mm3 Final   • Basophils, Absolute 12/17/2019 0.06  0.00 - 0.20 10*3/mm3 Final   • Immature Grans, Absolute 12/17/2019 0.03  0.00 - 0.05 10*3/mm3 Final   • nRBC 12/17/2019 0.0  0.0 - 0.2 /100 WBC Final        Ct Chest With Contrast    Result Date: 12/13/2019  Narrative: PROCEDURE: CT CHEST W CONTRAST-, CT ABDOMEN PELVIS W CONTRAST-  HISTORY: complete cancer staging; C34.90-Malignant neoplasm of unspecified part of unspecified bronchus or lung  COMPARISON: 09/12/2019, 09/11/2019 and 02/12/2019.   PROCEDURE: Axial images were obtained from the thoracic inlet through the pubic symphysis following the administration of Isovue 300 contrast.   FINDINGS:  CHEST: Soft tissue windows redemonstrate adenopathy within the chest. For example, a lymph node conglomerate in the right supraclavicular region measures 3.7 x 2.4 cm and is unchanged measuring 3.6 x 2.5 cm and February 2019. Mediastinal adenopathy is again noted. A precarinal lymph node is essentially unchanged measuring approximately 2.4 cm on today's exam and 2.3 cm on the prior. A prevascular lymph node measures 1.9 x 1.5 cm on today's exam and previously measured 2.1 x 0.5 cm. Heart size is normal. There are no pleural or pericardial effusions. Lung windows redemonstrate a nodule within the right upper lobe which has increased in size compared to the prior exam measuring 2.8 x 2.2 cm and previously measuring 2.0 x 1.4 cm. No new pulmonary nodules are identified. There are a few scattered calcified granulomas present. Bone windows reveal no lytic or destructive lesions.  ABDOMEN: The liver is homogeneous in appearance with no focal lesions. The spleen is unremarkable. The patient's right adrenal mass has increased in size compared to the prior exam measuring 5.6 x 3.2 cm and previously measuring 4.0 x 3.3 cm. A left adrenal mass measures 2.0 x 1.4 cm and previously measured 1.6 x 1.2 cm.  The pancreas is normal. The kidneys are unremarkable. The aorta is normal in caliber. There is no free fluid or adenopathy. The abdominal portions of the GI tract are unremarkable.  PELVIS: The appendix is normal. The urinary bladder is unremarkable. There is no significant free fluid or adenopathy. Bone windows reveal no lytic or destructive lesions.      Impression: Overall progression of the patient's disease with enlargement of the primary tumor seen in the medial right upper lobe, enlargement of a prevascular lymph node and enlargement of the patient's adrenal masses.   This study was performed with techniques to keep radiation doses as low as reasonably achievable (ALARA). Individualized dose reduction techniques using automated exposure control or adjustment of mA and/or kV according to the patient size were employed.  This report was finalized on 12/13/2019 12:11 PM by Pamela Oliveros M.D..    Mri Brain With & Without Contrast    Result Date: 12/13/2019  Narrative: PROCEDURE: MRI BRAIN W WO CONTRAST-  HISTORY: complete SCLC staging; C34.90-Malignant neoplasm of unspecified part of unspecified bronchus or lung  PROCEDURE: Multiplanar multisequence imaging of the brain was performed both before and following the administration of 15 mL MultiHance intravenous contrast.  COMPARISON: None.  FINDINGS: There is generalized cerebral volume loss. FLAIR hyperintensities in the periventricular white matter of both cerebral hemispheres is consistent with chronic small vessel ischemic change. There is no mass, mass effect or midline shift. There is no hydrocephalus. There are no areas of restricted diffusion. There is no pathologic contrast enhancement.  The midbrain, josephine, cerebellum and craniocervical junction are unremarkable. The sella and pituitary gland are within normal limits. The major intracranial vasculature demonstrates the expected flow related signal. The paranasal sinuses are clear.      Impression: No evidence of metastatic disease within the brain.    This report was finalized on 12/13/2019 12:14 PM by Pamela Oliveros M.D..    Ct Abdomen Pelvis With Contrast    Result Date: 12/13/2019  Narrative: PROCEDURE: CT CHEST W CONTRAST-, CT ABDOMEN PELVIS W CONTRAST-  HISTORY: complete cancer staging; C34.90-Malignant neoplasm of unspecified part of unspecified bronchus or lung  COMPARISON: 09/12/2019, 09/11/2019 and 02/12/2019.  PROCEDURE: Axial images were obtained from the thoracic inlet through the pubic symphysis following the administration of Isovue 300 contrast.    FINDINGS:  CHEST: Soft tissue windows redemonstrate adenopathy within the chest. For example, a lymph node conglomerate in the right supraclavicular region measures 3.7 x 2.4 cm and is unchanged measuring 3.6 x 2.5 cm and February 2019. Mediastinal adenopathy is again noted. A precarinal lymph node is essentially unchanged measuring approximately 2.4 cm on today's exam and 2.3 cm on the prior. A prevascular lymph node measures 1.9 x 1.5 cm on today's exam and previously measured 2.1 x 0.5 cm. Heart size is normal. There are no pleural or pericardial effusions. Lung windows redemonstrate a nodule within the right upper lobe which has increased in size compared to the prior exam measuring 2.8 x 2.2 cm and previously measuring 2.0 x 1.4 cm. No new pulmonary nodules are identified. There are a few scattered calcified granulomas present. Bone windows reveal no lytic or destructive lesions.  ABDOMEN: The liver is homogeneous in appearance with no focal lesions. The spleen is unremarkable. The patient's right adrenal mass has increased in size compared to the prior exam measuring 5.6 x 3.2 cm and previously measuring 4.0 x 3.3 cm. A left adrenal mass measures 2.0 x 1.4 cm and previously measured 1.6 x 1.2 cm.  The pancreas is normal. The kidneys are unremarkable. The aorta is normal in caliber. There is no free fluid or adenopathy. The abdominal portions of the GI tract are unremarkable.  PELVIS: The appendix is normal. The urinary bladder is unremarkable. There is no significant free fluid or adenopathy. Bone windows reveal no lytic or destructive lesions.      Impression: Overall progression of the patient's disease with enlargement of the primary tumor seen in the medial right upper lobe, enlargement of a prevascular lymph node and enlargement of the patient's adrenal masses.  This study was performed with techniques to keep radiation doses as low as reasonably achievable (ALARA). Individualized dose reduction  techniques using automated exposure control or adjustment of mA and/or kV according to the patient size were employed.  This report was finalized on 12/13/2019 12:11 PM by Pamela Oliveros M.D..    Xr Chest 1 View    Result Date: 1/3/2020  Narrative: PROCEDURE: XR CHEST 1 VW-  HISTORY: port placement; C34.90-Malignant neoplasm of unspecified part of unspecified bronchus or lung  COMPARISON: 12/13/2019.  FINDINGS: A right internal jugular Port-A-Cath is in place with the tip in the SVC. The heart is normal in size. The mediastinum is unremarkable. There are low lung volumes, however no focal opacities were effusions are evident. There is no pneumothorax.  There are no acute osseous abnormalities.      Impression: No acute cardiopulmonary process.  Continued followup is recommended.  This report was finalized on 1/3/2020 2:27 PM by Pamela Oliveros M.D..    Fl C Arm During Surgery    Result Date: 1/3/2020  Narrative: This procedure was auto-finalized with no dictation required.      ASSESSMENT: The patient is a very pleasant 69 y.o. female  with right upper lobe extensive stage small cell lung cancer     PROBLEM LIST:   1.  Right upper lobe extensive stage small cell lung cancer, [id number] stage IVb:  A.  Presented with right lower ribs pain  B.  CT chest revealed 3.5 cm right upper lobe lung mass, right adrenal nodule, mediastinal adenopathy, and left cervical lymphadenopathy.  C.  Status post left cervical lymph node biopsy done by Dr. Owen December 2, 2019  D.  Pathology consistent with small cell carcinoma  E.  Started palliative treatment with carboplatin and etoposide and Tecentriq December 15, 2019, status post 1 cycle  2.  COPD  3.  Hypertension  4.  Hypercholesterolemia  5.  Hypothyroid      PLAN:  1.  I will proceed with treatment as scheduled today carboplatin etoposide and Tecentriq cycle #2.  2.  The patient follow-up with us in 3 weeks for cycle #3.  3.  Repeat scans prior to cycle #4.  4.  We will  start maintenance treatment after cycle #4.  5.  I will continue to monitor the patient blood work including blood counts kidney function liver function electrolytes function.  6.  I will start patient on Zofran as needed for chemotherapy-induced nausea  7. We reviewed the potential side effects of this regimen including fatigue, vomiting and nausea, hair loss, nephropathy, neuropathy, hearing loss, myelosuppression, and risk of infusion reaction.  8. We discussed potential side effects of immunotherapy including but not limited to immune mediated reactions with thyroiditis, pneumonitis, hepatitis, colitis, rash, and electrolytes abnormalities, fatigue, multiorgan failure, and possibly death.  9.  I will monitor patient blood pressure will consider adjusting her antihypertensive agents if needed while she is on active cancer treatment.  10.  We will continue Synthroid daily for hypothyroid.  Patient is at risk for thyroiditis while she is on immunotherapy.  Rupesh Cao MD  1/8/2020

## 2020-01-09 ENCOUNTER — INFUSION (OUTPATIENT)
Dept: ONCOLOGY | Facility: HOSPITAL | Age: 70
End: 2020-01-09

## 2020-01-09 ENCOUNTER — TELEPHONE (OUTPATIENT)
Dept: ONCOLOGY | Facility: CLINIC | Age: 70
End: 2020-01-09

## 2020-01-09 VITALS
RESPIRATION RATE: 16 BRPM | WEIGHT: 131 LBS | BODY MASS INDEX: 23.96 KG/M2 | SYSTOLIC BLOOD PRESSURE: 147 MMHG | HEART RATE: 87 BPM | TEMPERATURE: 98.6 F | DIASTOLIC BLOOD PRESSURE: 66 MMHG

## 2020-01-09 DIAGNOSIS — C34.90 SCLC (SMALL CELL LUNG CARCINOMA) (HCC): Primary | ICD-10-CM

## 2020-01-09 PROCEDURE — 96413 CHEMO IV INFUSION 1 HR: CPT

## 2020-01-09 PROCEDURE — 25010000002 ETOPOSIDE 500 MG/25ML SOLUTION 25 ML VIAL: Performed by: INTERNAL MEDICINE

## 2020-01-09 PROCEDURE — 63710000001 PROCHLORPERAZINE MALEATE PER 10 MG: Performed by: INTERNAL MEDICINE

## 2020-01-09 RX ORDER — PROCHLORPERAZINE MALEATE 10 MG
10 TABLET ORAL ONCE
Status: COMPLETED | OUTPATIENT
Start: 2020-01-09 | End: 2020-01-09

## 2020-01-09 RX ORDER — DIPHENHYDRAMINE, LIDOCAINE, NYSTATIN
KIT ORAL
Qty: 240 ML | Refills: 3 | Status: SHIPPED | OUTPATIENT
Start: 2020-01-09 | End: 2020-01-10 | Stop reason: SDUPTHER

## 2020-01-09 RX ORDER — SODIUM CHLORIDE 9 MG/ML
250 INJECTION, SOLUTION INTRAVENOUS ONCE
Status: DISCONTINUED | OUTPATIENT
Start: 2020-01-09 | End: 2020-01-09 | Stop reason: HOSPADM

## 2020-01-09 RX ORDER — SODIUM CHLORIDE 0.9 % (FLUSH) 0.9 %
10 SYRINGE (ML) INJECTION AS NEEDED
Status: CANCELLED | OUTPATIENT
Start: 2020-01-09

## 2020-01-09 RX ORDER — SODIUM CHLORIDE 0.9 % (FLUSH) 0.9 %
10 SYRINGE (ML) INJECTION AS NEEDED
Status: DISCONTINUED | OUTPATIENT
Start: 2020-01-09 | End: 2020-01-09 | Stop reason: HOSPADM

## 2020-01-09 RX ADMIN — ETOPOSIDE 160 MG: 20 INJECTION, SOLUTION INTRAVENOUS at 13:32

## 2020-01-09 RX ADMIN — HEPARIN 500 UNITS: 100 SYRINGE at 14:35

## 2020-01-09 RX ADMIN — PROCHLORPERAZINE MALEATE 10 MG: 10 TABLET, FILM COATED ORAL at 13:07

## 2020-01-09 RX ADMIN — SODIUM CHLORIDE, PRESERVATIVE FREE 10 ML: 5 INJECTION INTRAVENOUS at 14:34

## 2020-01-09 NOTE — TELEPHONE ENCOUNTER
----- Message from Emerita Pineda RN sent at 1/9/2020  2:35 PM EST -----  Regarding: BADIN-MAGIC MOUTHWASH  Pt needs magic mouthwash called in please. Thanks  lisa

## 2020-01-10 ENCOUNTER — INFUSION (OUTPATIENT)
Dept: ONCOLOGY | Facility: HOSPITAL | Age: 70
End: 2020-01-10

## 2020-01-10 VITALS
TEMPERATURE: 98.5 F | SYSTOLIC BLOOD PRESSURE: 143 MMHG | RESPIRATION RATE: 16 BRPM | DIASTOLIC BLOOD PRESSURE: 64 MMHG | HEART RATE: 89 BPM | BODY MASS INDEX: 23.96 KG/M2 | WEIGHT: 131 LBS

## 2020-01-10 DIAGNOSIS — C34.90 SCLC (SMALL CELL LUNG CARCINOMA) (HCC): Primary | ICD-10-CM

## 2020-01-10 PROCEDURE — 96413 CHEMO IV INFUSION 1 HR: CPT

## 2020-01-10 PROCEDURE — 63710000001 PROCHLORPERAZINE MALEATE PER 10 MG: Performed by: INTERNAL MEDICINE

## 2020-01-10 PROCEDURE — 25010000002 ETOPOSIDE 500 MG/25ML SOLUTION 25 ML VIAL: Performed by: INTERNAL MEDICINE

## 2020-01-10 RX ORDER — SODIUM CHLORIDE 9 MG/ML
250 INJECTION, SOLUTION INTRAVENOUS ONCE
Status: DISCONTINUED | OUTPATIENT
Start: 2020-01-10 | End: 2020-01-10 | Stop reason: HOSPADM

## 2020-01-10 RX ORDER — SODIUM CHLORIDE 0.9 % (FLUSH) 0.9 %
10 SYRINGE (ML) INJECTION AS NEEDED
Status: CANCELLED | OUTPATIENT
Start: 2020-01-10

## 2020-01-10 RX ORDER — PROCHLORPERAZINE MALEATE 10 MG
10 TABLET ORAL ONCE
Status: COMPLETED | OUTPATIENT
Start: 2020-01-10 | End: 2020-01-10

## 2020-01-10 RX ORDER — HEPARIN SODIUM (PORCINE) LOCK FLUSH IV SOLN 100 UNIT/ML 100 UNIT/ML
500 SOLUTION INTRAVENOUS AS NEEDED
Status: CANCELLED | OUTPATIENT
Start: 2020-01-10

## 2020-01-10 RX ORDER — SODIUM CHLORIDE 0.9 % (FLUSH) 0.9 %
10 SYRINGE (ML) INJECTION AS NEEDED
Status: DISCONTINUED | OUTPATIENT
Start: 2020-01-10 | End: 2020-01-10 | Stop reason: HOSPADM

## 2020-01-10 RX ADMIN — HEPARIN 500 UNITS: 100 SYRINGE at 14:25

## 2020-01-10 RX ADMIN — ETOPOSIDE 160 MG: 20 INJECTION, SOLUTION INTRAVENOUS at 13:23

## 2020-01-10 RX ADMIN — SODIUM CHLORIDE, PRESERVATIVE FREE 10 ML: 5 INJECTION INTRAVENOUS at 14:24

## 2020-01-10 RX ADMIN — PROCHLORPERAZINE MALEATE 10 MG: 10 TABLET, FILM COATED ORAL at 13:10

## 2020-01-16 ENCOUNTER — OFFICE VISIT (OUTPATIENT)
Dept: SURGERY | Facility: CLINIC | Age: 70
End: 2020-01-16

## 2020-01-16 VITALS
SYSTOLIC BLOOD PRESSURE: 164 MMHG | WEIGHT: 130.95 LBS | TEMPERATURE: 98.6 F | HEIGHT: 62 IN | DIASTOLIC BLOOD PRESSURE: 82 MMHG | HEART RATE: 92 BPM | OXYGEN SATURATION: 96 % | BODY MASS INDEX: 24.1 KG/M2

## 2020-01-16 DIAGNOSIS — Z95.828 PORT-A-CATH IN PLACE: Primary | ICD-10-CM

## 2020-01-16 PROCEDURE — 99024 POSTOP FOLLOW-UP VISIT: CPT | Performed by: SURGERY

## 2020-01-21 ENCOUNTER — DOCUMENTATION (OUTPATIENT)
Dept: NUTRITION | Facility: HOSPITAL | Age: 70
End: 2020-01-21

## 2020-01-21 NOTE — OP NOTE
Prashanth Prajapati  1/3/2020    Pre-op Diagnosis:   SCLC (small cell lung carcinoma) (CMS/HCC) [C34.90]       Post-Op Diagnosis Codes:     * SCLC (small cell lung carcinoma) (CMS/HCC) [C34.90]    Procedure(s):  INSERTION OF PORTACATH WITH ULTRASOUND AND FLUOROSCOPIC GUIDANCE    Surgeon(s):  Bebe Owen MD    Anesthesia: Monitored Anesthesia Care    Staff:   Circulator: Sunitha Robert RN; Kaylee Layne RN  Radiology Technologist: Bel Troy  Scrub Person: Emerald Soto; Kendall Keller    Estimated Blood Loss: minimal    Urine Voided: * No values recorded between 1/3/2020 12:54 PM and 1/3/2020  1:54 PM *    Specimens:                None      Indications for the procedure:  Ms. Prajapati is a 69-year-old female patient recently diagnosed with extensive stage small cell lung cancer diagnosed in December 2019.  She has been seen by oncology, and is undergoing palliative treatment with carboplatin and etoposide with Tecentriq.  She received her first cycle of chemotherapy via peripheral IV, but a Port-A-Cath has been requested for long-term IV access.  She was counseled regarding the risks, benefits, and alternatives to the surgical procedure, and has provided her informed consent to proceed.  She presents today for the same.    Description of the procedure:    The patient was seen and examined preoperatively in the holding area on the day of the surgical procedure.  Her history and physical examination was updated as appropriate.  The patient was then brought to the operating room and placed supine on the operating table, where a timeout was performed using the WHO checklist.  She received appropriate preoperative antibiotics, and subcutaneous heparin for DVT prophylaxis.  Following the satisfactory induction of anesthesia, the patient's neck and chest was prepped and draped in the usual sterile fashion.  I elected to proceed with a left-sided approach based on location of the patient's  primary lung mass.  The patient was placed into the Trendelenburg position.    The left internal jugular vein was assessed for line placement and determined to be adequate.  The skin overlying the left internal jugular vein was preemptively anesthetized with lidocaine.  The vein was then punctured under live ultrasound guidance with return of dark red venous blood.  A guidewire was then threaded into the vein and its position was confirmed with both ultrasound and fluoroscopy.  The introducer needle was subsequently removed.  A location on the chest wall was then selected for port insertion.  This was similarly anesthetized lidocaine and the skin was transversely incised with a 15 blade knife.  A subcutaneous chest wall pocket was created and once this was accomplished, the tunneling device was passed from the chest wall pocket to the jugular vein insertion site and the catheter was threaded through the tunnel.  Under fluoroscopy, the peel-away sheath and dilator was passed over the guidewire into the jugular vein.  The guidewire was then removed along with the inner dilator.  This left only the peel-away sheath in place.  The catheter was then threaded through the peel-away sheath and the distal tip of the catheter was positioned near the atriocaval junction.  The peel-away sheath was subsequently removed.  The chest wall side of the catheter was then cut to the appropriate length and connected to the port.  The port was then placed into the subcutaneous chest wall pocket and secured in 2 locations with a 3-0 Prolene suture.  The port was accessed with a 20-gauge straight Grossman needle and there was return of dark red venous blood.  The port was then flushed with heparinized saline and was found to flush easily.  Final flush of full-strength heparin was instilled into the port.  The tissue overlying the port was then closed with an interrupted 3-0 Vicryl in the subcutaneous layer, and a running, subcuticular 4-0  Vicryl in the skin.  Mastisol and Steri-Strips were applied, and dry sterile dressings were placed.  The patient was then returned to the supine position, awakened from anesthesia, and taken to the recovery room in good condition.  No immediate complications were noted.  All sponge, needle, and instrument counts were correct at the conclusion of the procedure.  Dr. Owen was present scrubbed for the procedure in its entirety.  A postprocedural chest x-ray demonstrated the port to be in good position without evidence of pneumothorax.  The port was deemed appropriate for use.    Bebe Owen MD     Date: 1/20/2020  Time: 10:23 PM

## 2020-01-21 NOTE — PROGRESS NOTES
Nutrition Services    Patient Name:  Prashanth Prajapati  YOB: 1950  MRN: [id number]  Admit Date:  (Not on file)    RD phoned pt. For follow up call regarding Oncology visit and to screen for possible side effects of medication that may interfere with nutritional status.  Pt. With no questions regarding nutrition at this time. Pt. With no nutritional symptoms at this time. RD encouraged hydration and intake of water hourly with 8 glasses as the goal. RD also encouraged nutritional supplements daily.  Pt. Was encouraged to call with questions.         Electronically signed by:  Lulu Gaytan RD  01/21/20 11:57 AM

## 2020-01-27 ENCOUNTER — INFUSION (OUTPATIENT)
Dept: ONCOLOGY | Facility: HOSPITAL | Age: 70
End: 2020-01-27

## 2020-01-27 ENCOUNTER — OFFICE VISIT (OUTPATIENT)
Dept: ONCOLOGY | Facility: CLINIC | Age: 70
End: 2020-01-27

## 2020-01-27 VITALS
SYSTOLIC BLOOD PRESSURE: 169 MMHG | HEART RATE: 92 BPM | TEMPERATURE: 98.9 F | DIASTOLIC BLOOD PRESSURE: 83 MMHG | BODY MASS INDEX: 23.59 KG/M2 | RESPIRATION RATE: 18 BRPM | WEIGHT: 129 LBS

## 2020-01-27 VITALS
DIASTOLIC BLOOD PRESSURE: 87 MMHG | HEIGHT: 62 IN | OXYGEN SATURATION: 95 % | HEART RATE: 92 BPM | SYSTOLIC BLOOD PRESSURE: 169 MMHG | RESPIRATION RATE: 18 BRPM | TEMPERATURE: 98.9 F | BODY MASS INDEX: 23.74 KG/M2 | WEIGHT: 129 LBS

## 2020-01-27 DIAGNOSIS — C34.90 SCLC (SMALL CELL LUNG CARCINOMA) (HCC): ICD-10-CM

## 2020-01-27 DIAGNOSIS — C34.90 SCLC (SMALL CELL LUNG CARCINOMA) (HCC): Primary | ICD-10-CM

## 2020-01-27 LAB
ALBUMIN SERPL-MCNC: 4.1 G/DL (ref 3.5–5.2)
ALBUMIN/GLOB SERPL: 1.1 G/DL
ALP SERPL-CCNC: 93 U/L (ref 39–117)
ALT SERPL W P-5'-P-CCNC: 9 U/L (ref 1–33)
ANION GAP SERPL CALCULATED.3IONS-SCNC: 13.2 MMOL/L (ref 5–15)
AST SERPL-CCNC: 14 U/L (ref 1–32)
BILIRUB SERPL-MCNC: <0.2 MG/DL (ref 0.2–1.2)
BUN BLD-MCNC: 9 MG/DL (ref 8–23)
BUN/CREAT SERPL: 13.2 (ref 7–25)
CALCIUM SPEC-SCNC: 9.6 MG/DL (ref 8.6–10.5)
CHLORIDE SERPL-SCNC: 95 MMOL/L (ref 98–107)
CO2 SERPL-SCNC: 24.8 MMOL/L (ref 22–29)
CREAT BLD-MCNC: 0.68 MG/DL (ref 0.57–1)
DEPRECATED RDW RBC AUTO: 47.5 FL (ref 37–54)
ERYTHROCYTE [DISTWIDTH] IN BLOOD BY AUTOMATED COUNT: 15.7 % (ref 12.3–15.4)
GFR SERPL CREATININE-BSD FRML MDRD: 86 ML/MIN/1.73
GLOBULIN UR ELPH-MCNC: 3.6 GM/DL
GLUCOSE BLD-MCNC: 117 MG/DL (ref 65–99)
HCT VFR BLD AUTO: 30.9 % (ref 34–46.6)
HGB BLD-MCNC: 10.1 G/DL (ref 12–15.9)
MCH RBC QN AUTO: 30.3 PG (ref 26.6–33)
MCHC RBC AUTO-ENTMCNC: 32.7 G/DL (ref 31.5–35.7)
MCV RBC AUTO: 92.8 FL (ref 79–97)
PLATELET # BLD AUTO: 262 10*3/MM3 (ref 140–450)
PMV BLD AUTO: 9 FL (ref 6–12)
POTASSIUM BLD-SCNC: 4.5 MMOL/L (ref 3.5–5.2)
PROT SERPL-MCNC: 7.7 G/DL (ref 6–8.5)
RBC # BLD AUTO: 3.33 10*6/MM3 (ref 3.77–5.28)
SCAN SLIDE: NORMAL
SODIUM BLD-SCNC: 133 MMOL/L (ref 136–145)
T3FREE SERPL-MCNC: 2.8 PG/ML (ref 2–4.4)
T4 FREE SERPL-MCNC: 1.4 NG/DL (ref 0.93–1.7)
TSH SERPL DL<=0.05 MIU/L-ACNC: 0.99 UIU/ML (ref 0.27–4.2)
WBC NRBC COR # BLD: 4.32 10*3/MM3 (ref 3.4–10.8)

## 2020-01-27 PROCEDURE — 25010000002 FOSAPREPITANT PER 1 MG: Performed by: NURSE PRACTITIONER

## 2020-01-27 PROCEDURE — 99214 OFFICE O/P EST MOD 30 MIN: CPT | Performed by: NURSE PRACTITIONER

## 2020-01-27 PROCEDURE — 85025 COMPLETE CBC W/AUTO DIFF WBC: CPT

## 2020-01-27 PROCEDURE — 25010000002 ETOPOSIDE 100 MG/5ML SOLUTION 5 ML VIAL: Performed by: NURSE PRACTITIONER

## 2020-01-27 PROCEDURE — 25010000003 HEPARIN LOCK FLUCH PER 10 UNITS: Performed by: INTERNAL MEDICINE

## 2020-01-27 PROCEDURE — 96417 CHEMO IV INFUS EACH ADDL SEQ: CPT

## 2020-01-27 PROCEDURE — 84439 ASSAY OF FREE THYROXINE: CPT

## 2020-01-27 PROCEDURE — 96367 TX/PROPH/DG ADDL SEQ IV INF: CPT

## 2020-01-27 PROCEDURE — 84481 FREE ASSAY (FT-3): CPT

## 2020-01-27 PROCEDURE — 80053 COMPREHEN METABOLIC PANEL: CPT | Performed by: NURSE PRACTITIONER

## 2020-01-27 PROCEDURE — 85007 BL SMEAR W/DIFF WBC COUNT: CPT

## 2020-01-27 PROCEDURE — 96375 TX/PRO/DX INJ NEW DRUG ADDON: CPT

## 2020-01-27 PROCEDURE — 25010000002 DEXAMETHASONE PER 1 MG: Performed by: NURSE PRACTITIONER

## 2020-01-27 PROCEDURE — 85060 BLOOD SMEAR INTERPRETATION: CPT

## 2020-01-27 PROCEDURE — 25010000002 ATEZOLIZUMAB 1200 MG/20ML SOLUTION 20 ML VIAL: Performed by: NURSE PRACTITIONER

## 2020-01-27 PROCEDURE — 96413 CHEMO IV INFUSION 1 HR: CPT

## 2020-01-27 PROCEDURE — 25010000002 PALONOSETRON 0.25 MG/5ML SOLUTION PREFILLED SYRINGE: Performed by: NURSE PRACTITIONER

## 2020-01-27 PROCEDURE — 96366 THER/PROPH/DIAG IV INF ADDON: CPT

## 2020-01-27 PROCEDURE — 36415 COLL VENOUS BLD VENIPUNCTURE: CPT

## 2020-01-27 PROCEDURE — 84443 ASSAY THYROID STIM HORMONE: CPT

## 2020-01-27 PROCEDURE — 25010000002 CARBOPLATIN PER 50 MG: Performed by: NURSE PRACTITIONER

## 2020-01-27 RX ORDER — SODIUM CHLORIDE 0.9 % (FLUSH) 0.9 %
10 SYRINGE (ML) INJECTION AS NEEDED
Status: DISCONTINUED | OUTPATIENT
Start: 2020-01-27 | End: 2020-01-27 | Stop reason: HOSPADM

## 2020-01-27 RX ORDER — PROCHLORPERAZINE MALEATE 10 MG
10 TABLET ORAL ONCE
Status: CANCELLED | OUTPATIENT
Start: 2020-01-30

## 2020-01-27 RX ORDER — PROCHLORPERAZINE MALEATE 10 MG
10 TABLET ORAL ONCE
Status: CANCELLED | OUTPATIENT
Start: 2020-01-31

## 2020-01-27 RX ORDER — PALONOSETRON 0.05 MG/ML
0.25 INJECTION, SOLUTION INTRAVENOUS ONCE
Status: CANCELLED | OUTPATIENT
Start: 2020-01-29

## 2020-01-27 RX ORDER — PALONOSETRON 0.05 MG/ML
0.25 INJECTION, SOLUTION INTRAVENOUS ONCE
Status: COMPLETED | OUTPATIENT
Start: 2020-01-27 | End: 2020-01-27

## 2020-01-27 RX ORDER — SODIUM CHLORIDE 9 MG/ML
250 INJECTION, SOLUTION INTRAVENOUS ONCE
Status: CANCELLED | OUTPATIENT
Start: 2020-01-30

## 2020-01-27 RX ORDER — SODIUM CHLORIDE 9 MG/ML
250 INJECTION, SOLUTION INTRAVENOUS ONCE
Status: CANCELLED | OUTPATIENT
Start: 2020-01-29

## 2020-01-27 RX ORDER — SODIUM CHLORIDE 9 MG/ML
250 INJECTION, SOLUTION INTRAVENOUS ONCE
Status: DISCONTINUED | OUTPATIENT
Start: 2020-01-27 | End: 2020-01-27 | Stop reason: HOSPADM

## 2020-01-27 RX ORDER — DIPHENHYDRAMINE HYDROCHLORIDE 50 MG/ML
50 INJECTION INTRAMUSCULAR; INTRAVENOUS AS NEEDED
Status: CANCELLED | OUTPATIENT
Start: 2020-01-29

## 2020-01-27 RX ORDER — HEPARIN SODIUM (PORCINE) LOCK FLUSH IV SOLN 100 UNIT/ML 100 UNIT/ML
500 SOLUTION INTRAVENOUS AS NEEDED
Status: CANCELLED | OUTPATIENT
Start: 2020-01-27

## 2020-01-27 RX ORDER — FAMOTIDINE 10 MG/ML
20 INJECTION, SOLUTION INTRAVENOUS AS NEEDED
Status: CANCELLED | OUTPATIENT
Start: 2020-01-29

## 2020-01-27 RX ORDER — SODIUM CHLORIDE 0.9 % (FLUSH) 0.9 %
10 SYRINGE (ML) INJECTION AS NEEDED
Status: CANCELLED | OUTPATIENT
Start: 2020-01-27

## 2020-01-27 RX ORDER — HEPARIN SODIUM (PORCINE) LOCK FLUSH IV SOLN 100 UNIT/ML 100 UNIT/ML
500 SOLUTION INTRAVENOUS AS NEEDED
Status: DISCONTINUED | OUTPATIENT
Start: 2020-01-27 | End: 2020-01-27 | Stop reason: HOSPADM

## 2020-01-27 RX ORDER — SODIUM CHLORIDE 9 MG/ML
250 INJECTION, SOLUTION INTRAVENOUS ONCE
Status: CANCELLED | OUTPATIENT
Start: 2020-01-31

## 2020-01-27 RX ADMIN — ETOPOSIDE 160 MG: 20 INJECTION INTRAVENOUS at 12:29

## 2020-01-27 RX ADMIN — DEXAMETHASONE SODIUM PHOSPHATE 12 MG: 4 INJECTION, SOLUTION INTRAMUSCULAR; INTRAVENOUS at 11:17

## 2020-01-27 RX ADMIN — ATEZOLIZUMAB 1200 MG: 1200 INJECTION, SOLUTION INTRAVENOUS at 10:40

## 2020-01-27 RX ADMIN — SODIUM CHLORIDE 150 MG: 9 INJECTION, SOLUTION INTRAVENOUS at 11:31

## 2020-01-27 RX ADMIN — SODIUM CHLORIDE, PRESERVATIVE FREE 10 ML: 5 INJECTION INTRAVENOUS at 13:31

## 2020-01-27 RX ADMIN — HEPARIN 500 UNITS: 100 SYRINGE at 13:31

## 2020-01-27 RX ADMIN — SODIUM CHLORIDE 500 MG: 900 INJECTION, SOLUTION INTRAVENOUS at 11:57

## 2020-01-27 RX ADMIN — PALONOSETRON 0.25 MG: 0.25 INJECTION, SOLUTION INTRAVENOUS at 10:39

## 2020-01-27 NOTE — PROGRESS NOTES
DATE OF VISIT: 1/27/2020    REASON FOR VISIT: Followup for extensive stage small cell lung cancer     HISTORY OF PRESENT ILLNESS: The patient is a very pleasant 69 y.o. female  with past medical history significant for extensive stage small cell lung cancer diagnosed December 2, 2019.  She was started on palliative treatment with carboplatin and etoposide and Tecentriq December 15, 2019. The patient is here today for follow-up visit with treatment cycle #3.    SUBJECTIVE: The patient is here by herself. She has been doing fairly well. she was able to tolerate her treatment without any serious side effects. she denied any fever or chills, no night sweats, denied any headaches.  She had nausea but no vomiting. She has some persistent neuropathy but this has been ongoing for about 3-5 years. It is the same as it always has been she says. She has started using CBD oil for pain and neuropathy and it has improved her pain.     PAST MEDICAL HISTORY/SOCIAL HISTORY/FAMILY HISTORY: Reviewed by me and unchanged from my documentation done on 01/27/20.    Review of Systems   Constitutional: Negative for activity change, appetite change, chills, fatigue, fever and unexpected weight change.   HENT: Negative for hearing loss, mouth sores, nosebleeds, sore throat and trouble swallowing.    Eyes: Negative for visual disturbance.   Respiratory: Negative for cough, chest tightness, shortness of breath and wheezing.    Cardiovascular: Negative for chest pain, palpitations and leg swelling.   Gastrointestinal: Positive for nausea. Negative for abdominal distention, abdominal pain, blood in stool, constipation, diarrhea, rectal pain and vomiting.   Endocrine: Negative for cold intolerance and heat intolerance.   Genitourinary: Negative for difficulty urinating, dysuria, frequency and urgency.   Musculoskeletal: Negative for arthralgias, back pain, gait problem, joint swelling and myalgias.   Skin: Negative for rash.   Neurological:  Negative for dizziness, tremors, syncope, weakness, light-headedness, numbness and headaches.   Hematological: Negative for adenopathy. Does not bruise/bleed easily.   Psychiatric/Behavioral: Negative for confusion, sleep disturbance and suicidal ideas. The patient is not nervous/anxious.          Current Outpatient Medications:   •  acetaminophen (TYLENOL) 500 MG tablet, Take 500 mg by mouth Every 6 (Six) Hours As Needed for Mild Pain ., Disp: , Rfl:   •  amLODIPine (NORVASC) 5 MG tablet, Take 5 mg by mouth Daily., Disp: , Rfl:   •  baclofen (LIORESAL) 10 MG tablet, 10 mg QHS, Disp: , Rfl: 1  •  Calcium Carbonate-Vitamin D (CALCIUM-CARB 600 + D) 600-125 MG-UNIT tablet, Take 500 mg by mouth 2 (Two) Times a Day., Disp: , Rfl:   •  diclofenac (VOLTAREN) 75 MG EC tablet, Take 75 mg by mouth Daily As Needed., Disp: , Rfl:   •  gabapentin (NEURONTIN) 300 MG capsule, Take 1 capsule by mouth 3 (Three) Times a Day., Disp: 270 capsule, Rfl: 1  •  HYDROcodone-acetaminophen (NORCO) 7.5-325 MG per tablet, Take 1 tablet by mouth Every 4 (Four) Hours As Needed for Moderate Pain, Disp: 10 tablet, Rfl: 0  •  levothyroxine (SYNTHROID, LEVOTHROID) 125 MCG tablet, Take 1 tablet by mouth Daily., Disp: 90 tablet, Rfl: 3  •  lidocaine (LIDODERM) 5 %, Place 1 patch on the skin as directed by provider Daily. Remove & Discard patch within 12 hours or as directed by MD, Disp: 90 each, Rfl: 3  •  lidocaine-prilocaine (EMLA) 2.5-2.5 % cream, Apply  topically to the appropriate area as directed As Needed (45-60 minutes prior to port access.  Cover with saran/plastic wrap.)., Disp: 30 g, Rfl: 3  •  magic mouthwash oral suspension, Swish and spit and swallow 1-2 Teaspoonfuls (5-10ml) 4 times a day as needed, Disp: 240 mL, Rfl: 3  •  omeprazole (priLOSEC) 40 MG capsule, Take 1 capsule by mouth Daily., Disp: 30 capsule, Rfl: 3  •  ondansetron (ZOFRAN) 8 MG tablet, Take 1 tablet by mouth 3 (Three) Times a Day As Needed for Nausea or Vomiting., Disp:  "30 tablet, Rfl: 5  •  pravastatin (PRAVACHOL) 40 MG tablet, Take 40 mg by mouth Daily., Disp: , Rfl:   •  tiotropium bromide-olodaterol (STIOLTO RESPIMAT) 2.5-2.5 MCG/ACT aerosol solution inhaler, Inhale 2 puffs Daily., Disp: 1 inhaler, Rfl: 5  •  traMADol (ULTRAM) 50 MG tablet, Take 50 mg by mouth 3 (Three) Times a Day., Disp: , Rfl: 3    PHYSICAL EXAMINATION:   /87   Pulse 92   Temp 98.9 °F (37.2 °C) (Temporal)   Resp 18   Ht 157.5 cm (62.01\")   Wt 58.5 kg (129 lb)   SpO2 95%   BMI 23.59 kg/m²    ECOG Performance Status: 1 - Symptomatic but completely ambulatory  General Appearance:  alert, cooperative, no apparent distress and appears stated age   Neurologic/Psychiatric: A&O x 3, gait steady, appropriate affect, strength 5/5 in all muscle groups   HEENT:  Normocephalic, without obvious abnormality, mucous membranes moist   Neck: Supple, symmetrical, trachea midline, no adenopathy;  No thyromegaly, masses, or tenderness   Lungs:   Clear to auscultation bilaterally; respirations regular, even, and unlabored bilaterally   Heart:  Regular rate and rhythm, no murmurs appreciated   Abdomen:   Soft, non-tender, non-distended and no organomegaly   Lymph nodes: No cervical, supraclavicular, inguinal or axillary adenopathy noted   Extremities: Normal, atraumatic; no clubbing, cyanosis, or edema    Skin: No rashes, ulcers, or suspicious lesions noted     Infusion on 01/27/2020   Component Date Value Ref Range Status   • WBC 01/27/2020 4.32  3.40 - 10.80 10*3/mm3 Final   • RBC 01/27/2020 3.33* 3.77 - 5.28 10*6/mm3 Final   • Hemoglobin 01/27/2020 10.1* 12.0 - 15.9 g/dL Final   • Hematocrit 01/27/2020 30.9* 34.0 - 46.6 % Final   • MCV 01/27/2020 92.8  79.0 - 97.0 fL Final   • MCH 01/27/2020 30.3  26.6 - 33.0 pg Final   • MCHC 01/27/2020 32.7  31.5 - 35.7 g/dL Final   • RDW 01/27/2020 15.7* 12.3 - 15.4 % Final   • RDW-SD 01/27/2020 47.5  37.0 - 54.0 fl Final   • MPV 01/27/2020 9.0  6.0 - 12.0 fL Final   • Platelets " 01/27/2020 262  140 - 450 10*3/mm3 Final        Xr Chest 1 View    Result Date: 1/3/2020  Narrative: PROCEDURE: XR CHEST 1 VW-  HISTORY: port placement; C34.90-Malignant neoplasm of unspecified part of unspecified bronchus or lung  COMPARISON: 12/13/2019.  FINDINGS: A right internal jugular Port-A-Cath is in place with the tip in the SVC. The heart is normal in size. The mediastinum is unremarkable. There are low lung volumes, however no focal opacities were effusions are evident. There is no pneumothorax.  There are no acute osseous abnormalities.      Impression: No acute cardiopulmonary process.  Continued followup is recommended.  This report was finalized on 1/3/2020 2:27 PM by Pamela Oliveros M.D..    Fl C Arm During Surgery    Result Date: 1/3/2020  Narrative: This procedure was auto-finalized with no dictation required.      ASSESSMENT: The patient is a very pleasant 69 y.o. female  with right upper lobe extensive stage small cell lung cancer     PROBLEM LIST:   1.  Right upper lobe extensive stage small cell lung cancer, [id number] stage IVb:  A.  Presented with right lower ribs pain  B.  CT chest revealed 3.5 cm right upper lobe lung mass, right adrenal nodule, mediastinal adenopathy, and left cervical lymphadenopathy.  C.  Status post left cervical lymph node biopsy done by Dr. Owen December 2, 2019  D.  Pathology consistent with small cell carcinoma  E.  Started palliative treatment with carboplatin and etoposide and Tecentriq December 15, 2019, status post 2 cycle  2.  COPD  3.  Hypertension  4.  Hypercholesterolemia  5.  Hypothyroid      PLAN:  1.  I will proceed with treatment as scheduled today carboplatin etoposide and Tecentriq cycle #3.  2.  The patient follow-up with us in 3 weeks for cycle #4.  3.  Repeat scans prior to cycle #4. I will order today.   4.  We will start maintenance treatment after cycle #4.  5.  I will continue to monitor the patient blood work including blood counts kidney  function liver function electrolytes function.  6.  I will continue the patient on Zofran as needed for chemotherapy-induced nausea  7. We reviewed the potential side effects of this regimen including fatigue, vomiting and nausea, hair loss, nephropathy, neuropathy, hearing loss, myelosuppression, and risk of infusion reaction.  8. We reviewed potential side effects of immunotherapy including but not limited to immune mediated reactions with thyroiditis, pneumonitis, hepatitis, colitis, rash, and electrolytes abnormalities, fatigue, multiorgan failure, and possibly death.  9.  I will continue to monitor patient blood pressure will consider adjusting her antihypertensive agents if needed while she is on active cancer treatment.  10.  We will continue Synthroid daily for hypothyroid.  Patient is at risk for thyroiditis while she is on immunotherapy.      Kathe Higuera, APRN  1/27/2020

## 2020-01-28 ENCOUNTER — INFUSION (OUTPATIENT)
Dept: ONCOLOGY | Facility: HOSPITAL | Age: 70
End: 2020-01-28

## 2020-01-28 VITALS
TEMPERATURE: 98.9 F | HEART RATE: 85 BPM | BODY MASS INDEX: 24.14 KG/M2 | RESPIRATION RATE: 16 BRPM | SYSTOLIC BLOOD PRESSURE: 144 MMHG | DIASTOLIC BLOOD PRESSURE: 72 MMHG | WEIGHT: 132 LBS

## 2020-01-28 DIAGNOSIS — C34.90 SCLC (SMALL CELL LUNG CARCINOMA) (HCC): Primary | ICD-10-CM

## 2020-01-28 PROCEDURE — 96413 CHEMO IV INFUSION 1 HR: CPT

## 2020-01-28 PROCEDURE — 25010000002 ETOPOSIDE 100 MG/5ML SOLUTION 5 ML VIAL: Performed by: NURSE PRACTITIONER

## 2020-01-28 PROCEDURE — 25010000003 HEPARIN LOCK FLUCH PER 10 UNITS: Performed by: INTERNAL MEDICINE

## 2020-01-28 PROCEDURE — 63710000001 PROCHLORPERAZINE MALEATE PER 10 MG: Performed by: NURSE PRACTITIONER

## 2020-01-28 RX ORDER — HEPARIN SODIUM (PORCINE) LOCK FLUSH IV SOLN 100 UNIT/ML 100 UNIT/ML
500 SOLUTION INTRAVENOUS AS NEEDED
Status: CANCELLED | OUTPATIENT
Start: 2020-01-28

## 2020-01-28 RX ORDER — PROCHLORPERAZINE MALEATE 10 MG
10 TABLET ORAL ONCE
Status: COMPLETED | OUTPATIENT
Start: 2020-01-28 | End: 2020-01-28

## 2020-01-28 RX ORDER — SODIUM CHLORIDE 0.9 % (FLUSH) 0.9 %
10 SYRINGE (ML) INJECTION AS NEEDED
Status: CANCELLED | OUTPATIENT
Start: 2020-01-28

## 2020-01-28 RX ORDER — HEPARIN SODIUM (PORCINE) LOCK FLUSH IV SOLN 100 UNIT/ML 100 UNIT/ML
500 SOLUTION INTRAVENOUS AS NEEDED
Status: DISCONTINUED | OUTPATIENT
Start: 2020-01-28 | End: 2020-01-28 | Stop reason: HOSPADM

## 2020-01-28 RX ORDER — SODIUM CHLORIDE 0.9 % (FLUSH) 0.9 %
10 SYRINGE (ML) INJECTION AS NEEDED
Status: DISCONTINUED | OUTPATIENT
Start: 2020-01-28 | End: 2020-01-28 | Stop reason: HOSPADM

## 2020-01-28 RX ORDER — SODIUM CHLORIDE 9 MG/ML
250 INJECTION, SOLUTION INTRAVENOUS ONCE
Status: DISCONTINUED | OUTPATIENT
Start: 2020-01-28 | End: 2020-01-28 | Stop reason: HOSPADM

## 2020-01-28 RX ADMIN — HEPARIN 500 UNITS: 100 SYRINGE at 15:00

## 2020-01-28 RX ADMIN — PROCHLORPERAZINE MALEATE 10 MG: 10 TABLET, FILM COATED ORAL at 13:44

## 2020-01-28 RX ADMIN — SODIUM CHLORIDE, PRESERVATIVE FREE 10 ML: 5 INJECTION INTRAVENOUS at 15:00

## 2020-01-28 RX ADMIN — ETOPOSIDE 160 MG: 20 INJECTION INTRAVENOUS at 13:57

## 2020-01-29 ENCOUNTER — INFUSION (OUTPATIENT)
Dept: ONCOLOGY | Facility: HOSPITAL | Age: 70
End: 2020-01-29

## 2020-01-29 VITALS
HEART RATE: 80 BPM | WEIGHT: 131.8 LBS | DIASTOLIC BLOOD PRESSURE: 66 MMHG | TEMPERATURE: 99.1 F | SYSTOLIC BLOOD PRESSURE: 136 MMHG | RESPIRATION RATE: 14 BRPM | BODY MASS INDEX: 24.1 KG/M2 | OXYGEN SATURATION: 96 %

## 2020-01-29 DIAGNOSIS — C34.90 SCLC (SMALL CELL LUNG CARCINOMA) (HCC): Primary | ICD-10-CM

## 2020-01-29 PROCEDURE — 63710000001 PROCHLORPERAZINE MALEATE PER 10 MG: Performed by: NURSE PRACTITIONER

## 2020-01-29 PROCEDURE — 96413 CHEMO IV INFUSION 1 HR: CPT

## 2020-01-29 PROCEDURE — 25010000003 HEPARIN LOCK FLUCH PER 10 UNITS: Performed by: INTERNAL MEDICINE

## 2020-01-29 PROCEDURE — 25010000002 ETOPOSIDE 100 MG/5ML SOLUTION 5 ML VIAL: Performed by: NURSE PRACTITIONER

## 2020-01-29 RX ORDER — SODIUM CHLORIDE 9 MG/ML
250 INJECTION, SOLUTION INTRAVENOUS ONCE
Status: DISCONTINUED | OUTPATIENT
Start: 2020-01-29 | End: 2020-01-29 | Stop reason: HOSPADM

## 2020-01-29 RX ORDER — HEPARIN SODIUM (PORCINE) LOCK FLUSH IV SOLN 100 UNIT/ML 100 UNIT/ML
500 SOLUTION INTRAVENOUS AS NEEDED
Status: DISCONTINUED | OUTPATIENT
Start: 2020-01-29 | End: 2020-01-29 | Stop reason: HOSPADM

## 2020-01-29 RX ORDER — PROCHLORPERAZINE MALEATE 10 MG
10 TABLET ORAL ONCE
Status: COMPLETED | OUTPATIENT
Start: 2020-01-29 | End: 2020-01-29

## 2020-01-29 RX ORDER — SODIUM CHLORIDE 0.9 % (FLUSH) 0.9 %
10 SYRINGE (ML) INJECTION AS NEEDED
Status: DISCONTINUED | OUTPATIENT
Start: 2020-01-29 | End: 2020-01-29 | Stop reason: HOSPADM

## 2020-01-29 RX ORDER — HEPARIN SODIUM (PORCINE) LOCK FLUSH IV SOLN 100 UNIT/ML 100 UNIT/ML
500 SOLUTION INTRAVENOUS AS NEEDED
Status: CANCELLED | OUTPATIENT
Start: 2020-01-29

## 2020-01-29 RX ORDER — SODIUM CHLORIDE 0.9 % (FLUSH) 0.9 %
10 SYRINGE (ML) INJECTION AS NEEDED
Status: CANCELLED | OUTPATIENT
Start: 2020-01-29

## 2020-01-29 RX ADMIN — HEPARIN 500 UNITS: 100 SYRINGE at 14:43

## 2020-01-29 RX ADMIN — PROCHLORPERAZINE MALEATE 10 MG: 10 TABLET, FILM COATED ORAL at 13:21

## 2020-01-29 RX ADMIN — SODIUM CHLORIDE, PRESERVATIVE FREE 10 ML: 5 INJECTION INTRAVENOUS at 14:43

## 2020-01-29 RX ADMIN — ETOPOSIDE 160 MG: 20 INJECTION INTRAVENOUS at 13:40

## 2020-01-30 LAB
ANISOCYTOSIS BLD QL: ABNORMAL
BASOPHILS # BLD MANUAL: 0.04 10*3/MM3 (ref 0–0.2)
BASOPHILS NFR BLD AUTO: 1 % (ref 0–1.5)
CYTOLOGIST CVX/VAG CYTO: NORMAL
EOSINOPHIL # BLD MANUAL: 0.04 10*3/MM3 (ref 0–0.4)
EOSINOPHIL NFR BLD MANUAL: 1 % (ref 0.3–6.2)
LYMPHOCYTES # BLD MANUAL: 1.81 10*3/MM3 (ref 0.7–3.1)
LYMPHOCYTES NFR BLD MANUAL: 32 % (ref 5–12)
LYMPHOCYTES NFR BLD MANUAL: 42 % (ref 19.6–45.3)
METAMYELOCYTES NFR BLD MANUAL: 2 % (ref 0–0)
MONOCYTES # BLD AUTO: 1.38 10*3/MM3 (ref 0.1–0.9)
NEUTROPHILS # BLD AUTO: 0.95 10*3/MM3 (ref 1.7–7)
NEUTROPHILS NFR BLD MANUAL: 16 % (ref 42.7–76)
NEUTS BAND NFR BLD MANUAL: 6 % (ref 0–5)
PATH INTERP BLD-IMP: NORMAL
SMALL PLATELETS BLD QL SMEAR: ADEQUATE
WBC MORPH BLD: NORMAL

## 2020-02-07 ENCOUNTER — TELEPHONE (OUTPATIENT)
Dept: INTERNAL MEDICINE | Facility: CLINIC | Age: 70
End: 2020-02-07

## 2020-02-07 DIAGNOSIS — G89.29 CHRONIC JOINT PAIN: ICD-10-CM

## 2020-02-07 DIAGNOSIS — M25.50 CHRONIC JOINT PAIN: ICD-10-CM

## 2020-02-07 DIAGNOSIS — C80.1 NEUROPATHY ASSOCIATED WITH CANCER: Primary | ICD-10-CM

## 2020-02-07 DIAGNOSIS — G63 NEUROPATHY ASSOCIATED WITH CANCER: Primary | ICD-10-CM

## 2020-02-07 RX ORDER — GABAPENTIN 300 MG/1
CAPSULE ORAL
Qty: 360 CAPSULE | Refills: 1 | Status: SHIPPED | OUTPATIENT
Start: 2020-02-07 | End: 2020-09-01 | Stop reason: SDUPTHER

## 2020-02-07 NOTE — TELEPHONE ENCOUNTER
Pt called and stated that her gabapentin (NEURONTIN) 300 MG capsule is not the correct dosage. Pt stated that neurologist from [place] before she moved here wanted her to take one in the morning, one mid day and two at night. Would like the Rx to be written for this dosage.    Please advise    Pt call back  [phone]  [facility]

## 2020-02-10 ENCOUNTER — TELEPHONE (OUTPATIENT)
Dept: INTERNAL MEDICINE | Facility: CLINIC | Age: 70
End: 2020-02-10

## 2020-02-10 NOTE — TELEPHONE ENCOUNTER
Patient called and requesting refill for Calcium Carbonate-Vitamin D (CALCIUM-CARB 600 + D) 600-125 MG-UNIT tablet.    Patient also called regarding RX for gabapentin. Patient stated that the current dosage is not current. And that the correct dosage is one in the morning,  one mid day and 2 at night.    Please advise.    Patient callback: [phone]    Pharmnacy: [id number] - [place], [place] - [address].

## 2020-02-10 NOTE — TELEPHONE ENCOUNTER
Spoke with Francoise at [facility] pharmacy and she states they never received the rx for gabapentin on 2/7/2020. Read the confirmation from our records, Francoise checked the patient's file and there is no record of the prescription. So gave a verbal as directed from Dr. Askew's prescription, verified her name, KENDALL number, and our office info. She states the patient will not be able to fill the gabapentin because she just filled 270 in December. She confirms she did receive the rx for calcium that was sent over today.     Called the patient and left a voicemail about the gabapentin. Call if she needs anything else.

## 2020-02-12 DIAGNOSIS — G89.29 CHRONIC JOINT PAIN: ICD-10-CM

## 2020-02-12 DIAGNOSIS — M25.50 CHRONIC JOINT PAIN: ICD-10-CM

## 2020-02-13 RX ORDER — BACLOFEN 10 MG/1
TABLET ORAL
Qty: 270 TABLET | Refills: 1 | Status: SHIPPED | OUTPATIENT
Start: 2020-02-13 | End: 2020-08-05

## 2020-02-17 ENCOUNTER — HOSPITAL ENCOUNTER (OUTPATIENT)
Dept: CT IMAGING | Facility: HOSPITAL | Age: 70
Discharge: HOME | End: 2020-02-17
Admitting: NURSE PRACTITIONER

## 2020-02-17 ENCOUNTER — HOSPITAL ENCOUNTER (OUTPATIENT)
Dept: CT IMAGING | Facility: HOSPITAL | Age: 70
Discharge: HOME | End: 2020-02-17

## 2020-02-17 DIAGNOSIS — C34.90 SCLC (SMALL CELL LUNG CARCINOMA) (HCC): ICD-10-CM

## 2020-02-17 PROCEDURE — 71260 CT THORAX DX C+: CPT

## 2020-02-17 PROCEDURE — 74177 CT ABD & PELVIS W/CONTRAST: CPT

## 2020-02-17 PROCEDURE — 25010000002 IOPAMIDOL 61 % SOLUTION: Performed by: NURSE PRACTITIONER

## 2020-02-17 RX ADMIN — IOPAMIDOL 100 ML: 612 INJECTION, SOLUTION INTRAVENOUS at 07:02

## 2020-02-19 ENCOUNTER — INFUSION (OUTPATIENT)
Dept: ONCOLOGY | Facility: HOSPITAL | Age: 70
End: 2020-02-19

## 2020-02-19 ENCOUNTER — OFFICE VISIT (OUTPATIENT)
Dept: ONCOLOGY | Facility: CLINIC | Age: 70
End: 2020-02-19

## 2020-02-19 VITALS
DIASTOLIC BLOOD PRESSURE: 72 MMHG | HEART RATE: 96 BPM | TEMPERATURE: 98 F | HEIGHT: 62 IN | OXYGEN SATURATION: 96 % | WEIGHT: 131 LBS | SYSTOLIC BLOOD PRESSURE: 137 MMHG | RESPIRATION RATE: 16 BRPM | BODY MASS INDEX: 24.11 KG/M2

## 2020-02-19 DIAGNOSIS — C34.90 SCLC (SMALL CELL LUNG CARCINOMA) (HCC): Primary | ICD-10-CM

## 2020-02-19 LAB
ALBUMIN SERPL-MCNC: 4.1 G/DL (ref 3.5–5.2)
ALBUMIN/GLOB SERPL: 1.2 G/DL
ALP SERPL-CCNC: 88 U/L (ref 39–117)
ALT SERPL W P-5'-P-CCNC: 10 U/L (ref 1–33)
ANION GAP SERPL CALCULATED.3IONS-SCNC: 10.8 MMOL/L (ref 5–15)
AST SERPL-CCNC: 15 U/L (ref 1–32)
BASOPHILS # BLD AUTO: 0.05 10*3/MM3 (ref 0–0.2)
BASOPHILS NFR BLD AUTO: 0.7 % (ref 0–1.5)
BILIRUB SERPL-MCNC: 0.2 MG/DL (ref 0.2–1.2)
BUN BLD-MCNC: 12 MG/DL (ref 8–23)
BUN/CREAT SERPL: 17.1 (ref 7–25)
CALCIUM SPEC-SCNC: 9.2 MG/DL (ref 8.6–10.5)
CHLORIDE SERPL-SCNC: 96 MMOL/L (ref 98–107)
CO2 SERPL-SCNC: 25.2 MMOL/L (ref 22–29)
CREAT BLD-MCNC: 0.7 MG/DL (ref 0.57–1)
DEPRECATED RDW RBC AUTO: 65.5 FL (ref 37–54)
EOSINOPHIL # BLD AUTO: 0.06 10*3/MM3 (ref 0–0.4)
EOSINOPHIL NFR BLD AUTO: 0.9 % (ref 0.3–6.2)
ERYTHROCYTE [DISTWIDTH] IN BLOOD BY AUTOMATED COUNT: 19.4 % (ref 12.3–15.4)
GFR SERPL CREATININE-BSD FRML MDRD: 83 ML/MIN/1.73
GLOBULIN UR ELPH-MCNC: 3.3 GM/DL
GLUCOSE BLD-MCNC: 120 MG/DL (ref 65–99)
HCT VFR BLD AUTO: 30.4 % (ref 34–46.6)
HGB BLD-MCNC: 9.9 G/DL (ref 12–15.9)
IMM GRANULOCYTES # BLD AUTO: 0.06 10*3/MM3 (ref 0–0.05)
IMM GRANULOCYTES NFR BLD AUTO: 0.9 % (ref 0–0.5)
LYMPHOCYTES # BLD AUTO: 1.59 10*3/MM3 (ref 0.7–3.1)
LYMPHOCYTES NFR BLD AUTO: 23.6 % (ref 19.6–45.3)
MCH RBC QN AUTO: 31 PG (ref 26.6–33)
MCHC RBC AUTO-ENTMCNC: 32.6 G/DL (ref 31.5–35.7)
MCV RBC AUTO: 95.3 FL (ref 79–97)
MONOCYTES # BLD AUTO: 1.38 10*3/MM3 (ref 0.1–0.9)
MONOCYTES NFR BLD AUTO: 20.4 % (ref 5–12)
NEUTROPHILS # BLD AUTO: 3.61 10*3/MM3 (ref 1.7–7)
NEUTROPHILS NFR BLD AUTO: 53.5 % (ref 42.7–76)
NRBC BLD AUTO-RTO: 0 /100 WBC (ref 0–0.2)
PLAT MORPH BLD: NORMAL
PLATELET # BLD AUTO: 265 10*3/MM3 (ref 140–450)
PMV BLD AUTO: 9.3 FL (ref 6–12)
POTASSIUM BLD-SCNC: 4.6 MMOL/L (ref 3.5–5.2)
PROT SERPL-MCNC: 7.4 G/DL (ref 6–8.5)
RBC # BLD AUTO: 3.19 10*6/MM3 (ref 3.77–5.28)
RBC MORPH BLD: NORMAL
SODIUM BLD-SCNC: 132 MMOL/L (ref 136–145)
WBC MORPH BLD: NORMAL
WBC NRBC COR # BLD: 6.75 10*3/MM3 (ref 3.4–10.8)

## 2020-02-19 PROCEDURE — 80053 COMPREHEN METABOLIC PANEL: CPT

## 2020-02-19 PROCEDURE — 25010000002 PALONOSETRON 0.25 MG/5ML SOLUTION PREFILLED SYRINGE: Performed by: INTERNAL MEDICINE

## 2020-02-19 PROCEDURE — 96367 TX/PROPH/DG ADDL SEQ IV INF: CPT

## 2020-02-19 PROCEDURE — 96417 CHEMO IV INFUS EACH ADDL SEQ: CPT

## 2020-02-19 PROCEDURE — 96413 CHEMO IV INFUSION 1 HR: CPT

## 2020-02-19 PROCEDURE — 25010000002 ETOPOSIDE 100 MG/5ML SOLUTION 5 ML VIAL: Performed by: INTERNAL MEDICINE

## 2020-02-19 PROCEDURE — 25010000002 FOSAPREPITANT PER 1 MG: Performed by: INTERNAL MEDICINE

## 2020-02-19 PROCEDURE — 36415 COLL VENOUS BLD VENIPUNCTURE: CPT

## 2020-02-19 PROCEDURE — 25010000002 ATEZOLIZUMAB 1200 MG/20ML SOLUTION 20 ML VIAL: Performed by: INTERNAL MEDICINE

## 2020-02-19 PROCEDURE — 96368 THER/DIAG CONCURRENT INF: CPT

## 2020-02-19 PROCEDURE — 25010000002 DEXAMETHASONE PER 1 MG: Performed by: INTERNAL MEDICINE

## 2020-02-19 PROCEDURE — 96375 TX/PRO/DX INJ NEW DRUG ADDON: CPT

## 2020-02-19 PROCEDURE — 25010000003 HEPARIN LOCK FLUCH PER 10 UNITS: Performed by: INTERNAL MEDICINE

## 2020-02-19 PROCEDURE — 85025 COMPLETE CBC W/AUTO DIFF WBC: CPT

## 2020-02-19 PROCEDURE — 99215 OFFICE O/P EST HI 40 MIN: CPT | Performed by: INTERNAL MEDICINE

## 2020-02-19 PROCEDURE — 25010000002 CARBOPLATIN PER 50 MG: Performed by: INTERNAL MEDICINE

## 2020-02-19 PROCEDURE — 85007 BL SMEAR W/DIFF WBC COUNT: CPT

## 2020-02-19 RX ORDER — SODIUM CHLORIDE 9 MG/ML
250 INJECTION, SOLUTION INTRAVENOUS ONCE
Status: DISCONTINUED | OUTPATIENT
Start: 2020-02-19 | End: 2020-02-19 | Stop reason: HOSPADM

## 2020-02-19 RX ORDER — PALONOSETRON 0.05 MG/ML
0.25 INJECTION, SOLUTION INTRAVENOUS ONCE
Status: COMPLETED | OUTPATIENT
Start: 2020-02-19 | End: 2020-02-19

## 2020-02-19 RX ORDER — SODIUM CHLORIDE 0.9 % (FLUSH) 0.9 %
10 SYRINGE (ML) INJECTION AS NEEDED
Status: CANCELLED | OUTPATIENT
Start: 2020-02-19

## 2020-02-19 RX ORDER — PROCHLORPERAZINE MALEATE 10 MG
10 TABLET ORAL ONCE
Status: CANCELLED | OUTPATIENT
Start: 2020-02-21

## 2020-02-19 RX ORDER — PROCHLORPERAZINE MALEATE 10 MG
10 TABLET ORAL ONCE
Status: CANCELLED | OUTPATIENT
Start: 2020-02-20

## 2020-02-19 RX ORDER — HEPARIN SODIUM (PORCINE) LOCK FLUSH IV SOLN 100 UNIT/ML 100 UNIT/ML
500 SOLUTION INTRAVENOUS AS NEEDED
Status: DISCONTINUED | OUTPATIENT
Start: 2020-02-19 | End: 2020-02-19 | Stop reason: HOSPADM

## 2020-02-19 RX ORDER — SODIUM CHLORIDE 0.9 % (FLUSH) 0.9 %
10 SYRINGE (ML) INJECTION AS NEEDED
Status: DISCONTINUED | OUTPATIENT
Start: 2020-02-19 | End: 2020-02-19 | Stop reason: HOSPADM

## 2020-02-19 RX ORDER — SODIUM CHLORIDE 9 MG/ML
250 INJECTION, SOLUTION INTRAVENOUS ONCE
Status: CANCELLED | OUTPATIENT
Start: 2020-02-20

## 2020-02-19 RX ORDER — DIPHENHYDRAMINE HYDROCHLORIDE 50 MG/ML
50 INJECTION INTRAMUSCULAR; INTRAVENOUS AS NEEDED
Status: CANCELLED | OUTPATIENT
Start: 2020-02-19

## 2020-02-19 RX ORDER — SODIUM CHLORIDE 9 MG/ML
250 INJECTION, SOLUTION INTRAVENOUS ONCE
Status: CANCELLED | OUTPATIENT
Start: 2020-02-19

## 2020-02-19 RX ORDER — FAMOTIDINE 10 MG/ML
20 INJECTION, SOLUTION INTRAVENOUS AS NEEDED
Status: DISCONTINUED | OUTPATIENT
Start: 2020-02-19 | End: 2020-02-19 | Stop reason: HOSPADM

## 2020-02-19 RX ORDER — PALONOSETRON 0.05 MG/ML
0.25 INJECTION, SOLUTION INTRAVENOUS ONCE
Status: CANCELLED | OUTPATIENT
Start: 2020-02-19

## 2020-02-19 RX ORDER — SODIUM CHLORIDE 9 MG/ML
250 INJECTION, SOLUTION INTRAVENOUS ONCE
Status: CANCELLED | OUTPATIENT
Start: 2020-02-21

## 2020-02-19 RX ORDER — HEPARIN SODIUM (PORCINE) LOCK FLUSH IV SOLN 100 UNIT/ML 100 UNIT/ML
500 SOLUTION INTRAVENOUS AS NEEDED
Status: CANCELLED | OUTPATIENT
Start: 2020-02-19

## 2020-02-19 RX ORDER — FAMOTIDINE 10 MG/ML
20 INJECTION, SOLUTION INTRAVENOUS AS NEEDED
Status: CANCELLED | OUTPATIENT
Start: 2020-02-19

## 2020-02-19 RX ORDER — DIPHENHYDRAMINE HYDROCHLORIDE 50 MG/ML
50 INJECTION INTRAMUSCULAR; INTRAVENOUS AS NEEDED
Status: DISCONTINUED | OUTPATIENT
Start: 2020-02-19 | End: 2020-02-19 | Stop reason: HOSPADM

## 2020-02-19 RX ADMIN — HEPARIN 500 UNITS: 100 SYRINGE at 13:56

## 2020-02-19 RX ADMIN — SODIUM CHLORIDE 150 MG: 9 INJECTION, SOLUTION INTRAVENOUS at 11:41

## 2020-02-19 RX ADMIN — DEXAMETHASONE SODIUM PHOSPHATE 12 MG: 4 INJECTION, SOLUTION INTRAMUSCULAR; INTRAVENOUS at 11:45

## 2020-02-19 RX ADMIN — PALONOSETRON 0.25 MG: 0.25 INJECTION, SOLUTION INTRAVENOUS at 11:02

## 2020-02-19 RX ADMIN — ATEZOLIZUMAB 1200 MG: 1200 INJECTION, SOLUTION INTRAVENOUS at 11:03

## 2020-02-19 RX ADMIN — CARBOPLATIN 490 MG: 10 INJECTION, SOLUTION INTRAVENOUS at 12:10

## 2020-02-19 RX ADMIN — ETOPOSIDE 160 MG: 20 INJECTION, SOLUTION, CONCENTRATE INTRAVENOUS at 12:47

## 2020-02-19 RX ADMIN — SODIUM CHLORIDE, PRESERVATIVE FREE 10 ML: 5 INJECTION INTRAVENOUS at 13:56

## 2020-02-19 NOTE — PROGRESS NOTES
DATE OF VISIT: 2/19/2020    REASON FOR VISIT: Followup for extensive stage small cell lung cancer     HISTORY OF PRESENT ILLNESS: The patient is a very pleasant 69 y.o. female  with past medical history significant for extensive stage small cell lung cancer diagnosed December 2, 2019.  She was started on palliative treatment with carboplatin and etoposide and Tecentriq December 15, 2019. The patient is here today for follow-up visit with treatment cycle #4.    SUBJECTIVE: The patient is here today with her sister.  She is doing fairly well.  She is asked about the scan results.  Her breathing has improved significantly.    PAST MEDICAL HISTORY/SOCIAL HISTORY/FAMILY HISTORY: Reviewed by me and unchanged from my documentation done on 02/19/20.    Review of Systems   Constitutional: Negative for activity change, appetite change, chills, fatigue, fever and unexpected weight change.   HENT: Negative for hearing loss, mouth sores, nosebleeds, sore throat and trouble swallowing.    Eyes: Negative for visual disturbance.   Respiratory: Negative for cough, chest tightness, shortness of breath and wheezing.    Cardiovascular: Negative for chest pain, palpitations and leg swelling.   Gastrointestinal: Positive for nausea. Negative for abdominal distention, abdominal pain, blood in stool, constipation, diarrhea, rectal pain and vomiting.   Endocrine: Negative for cold intolerance and heat intolerance.   Genitourinary: Negative for difficulty urinating, dysuria, frequency and urgency.   Musculoskeletal: Negative for arthralgias, back pain, gait problem, joint swelling and myalgias.   Skin: Negative for rash.   Neurological: Negative for dizziness, tremors, syncope, weakness, light-headedness, numbness and headaches.   Hematological: Negative for adenopathy. Does not bruise/bleed easily.   Psychiatric/Behavioral: Negative for confusion, sleep disturbance and suicidal ideas. The patient is not nervous/anxious.          Current  Outpatient Medications:   •  acetaminophen (TYLENOL) 500 MG tablet, Take 500 mg by mouth Every 6 (Six) Hours As Needed for Mild Pain ., Disp: , Rfl:   •  amLODIPine (NORVASC) 5 MG tablet, Take 5 mg by mouth Daily., Disp: , Rfl:   •  baclofen (LIORESAL) 10 MG tablet, 1 TABLET BY MOUTH THREE TIMES A DAY START WITH ONE PILL NIGHTLY FOR A WEEK, THEN INCREASE TO TWICE A DAY FOR A WEEK, THEN THREE TIMES A DAY, Disp: 270 tablet, Rfl: 1  •  Calcium Carbonate-Vitamin D (CALCIUM-CARB 600 + D) 600-125 MG-UNIT tablet, Take 500 mg by mouth 2 (Two) Times a Day., Disp: 60 each, Rfl: 5  •  diclofenac (VOLTAREN) 75 MG EC tablet, Take 75 mg by mouth Daily As Needed., Disp: , Rfl:   •  gabapentin (NEURONTIN) 300 MG capsule, Take 1 capsule by mouth Daily With Breakfast & Lunch AND 2 capsules Every Night., Disp: 360 capsule, Rfl: 1  •  HYDROcodone-acetaminophen (NORCO) 7.5-325 MG per tablet, Take 1 tablet by mouth Every 4 (Four) Hours As Needed for Moderate Pain, Disp: 10 tablet, Rfl: 0  •  levothyroxine (SYNTHROID, LEVOTHROID) 125 MCG tablet, Take 1 tablet by mouth Daily., Disp: 90 tablet, Rfl: 3  •  lidocaine (LIDODERM) 5 %, Place 1 patch on the skin as directed by provider Daily. Remove & Discard patch within 12 hours or as directed by MD, Disp: 90 each, Rfl: 3  •  lidocaine-prilocaine (EMLA) 2.5-2.5 % cream, Apply  topically to the appropriate area as directed As Needed (45-60 minutes prior to port access.  Cover with saran/plastic wrap.)., Disp: 30 g, Rfl: 3  •  magic mouthwash oral suspension, Swish and spit (or swallow) 1-2 Teaspoonfuls (5-10ml) 4 times a day as needed, Disp: 240 mL, Rfl: 3  •  omeprazole (priLOSEC) 40 MG capsule, Take 1 capsule by mouth Daily., Disp: 30 capsule, Rfl: 3  •  ondansetron (ZOFRAN) 8 MG tablet, Take 1 tablet by mouth 3 (Three) Times a Day As Needed for Nausea or Vomiting., Disp: 30 tablet, Rfl: 5  •  pravastatin (PRAVACHOL) 40 MG tablet, Take 40 mg by mouth Daily., Disp: , Rfl:   •  tiotropium  "bromide-olodaterol (STIOLTO RESPIMAT) 2.5-2.5 MCG/ACT aerosol solution inhaler, Inhale 2 puffs Daily., Disp: 1 inhaler, Rfl: 5  •  traMADol (ULTRAM) 50 MG tablet, Take 50 mg by mouth 3 (Three) Times a Day., Disp: , Rfl: 3    PHYSICAL EXAMINATION:   /72   Pulse 96   Temp 98 °F (36.7 °C) (Temporal)   Resp 16   Ht 157.5 cm (62.01\")   Wt 59.4 kg (131 lb)   SpO2 96%   BMI 23.95 kg/m²    ECOG Performance Status: 1 - Symptomatic but completely ambulatory  General Appearance:  alert, cooperative, no apparent distress and appears stated age   Neurologic/Psychiatric: A&O x 3, gait steady, appropriate affect, strength 5/5 in all muscle groups   HEENT:  Normocephalic, without obvious abnormality, mucous membranes moist   Neck: Supple, symmetrical, trachea midline, no adenopathy;  No thyromegaly, masses, or tenderness   Lungs:   Clear to auscultation bilaterally; respirations regular, even, and unlabored bilaterally   Heart:  Regular rate and rhythm, no murmurs appreciated   Abdomen:   Soft, non-tender, non-distended and no organomegaly   Lymph nodes: No cervical, supraclavicular, inguinal or axillary adenopathy noted   Extremities: Normal, atraumatic; no clubbing, cyanosis, or edema    Skin: No rashes, ulcers, or suspicious lesions noted     No visits with results within 2 Week(s) from this visit.   Latest known visit with results is:   Office Visit on 01/27/2020   Component Date Value Ref Range Status   • Glucose 01/27/2020 117* 65 - 99 mg/dL Final   • BUN 01/27/2020 9  8 - 23 mg/dL Final   • Creatinine 01/27/2020 0.68  0.57 - 1.00 mg/dL Final   • Sodium 01/27/2020 133* 136 - 145 mmol/L Final   • Potassium 01/27/2020 4.5  3.5 - 5.2 mmol/L Final   • Chloride 01/27/2020 95* 98 - 107 mmol/L Final   • CO2 01/27/2020 24.8  22.0 - 29.0 mmol/L Final   • Calcium 01/27/2020 9.6  8.6 - 10.5 mg/dL Final   • Total Protein 01/27/2020 7.7  6.0 - 8.5 g/dL Final   • Albumin 01/27/2020 4.10  3.50 - 5.20 g/dL Final   • ALT (SGPT) " 01/27/2020 9  1 - 33 U/L Final   • AST (SGOT) 01/27/2020 14  1 - 32 U/L Final   • Alkaline Phosphatase 01/27/2020 93  39 - 117 U/L Final   • Total Bilirubin 01/27/2020 <0.2* 0.2 - 1.2 mg/dL Final   • eGFR Non African Amer 01/27/2020 86  >60 mL/min/1.73 Final   • Globulin 01/27/2020 3.6  gm/dL Final   • A/G Ratio 01/27/2020 1.1  g/dL Final   • BUN/Creatinine Ratio 01/27/2020 13.2  7.0 - 25.0 Final   • Anion Gap 01/27/2020 13.2  5.0 - 15.0 mmol/L Final        Ct Chest With Contrast    Result Date: 2/17/2020  Narrative: PROCEDURE: CT CHEST W CONTRAST-, CT ABDOMEN PELVIS W CONTRAST-  HISTORY: Follow up extensive stage small cell lung cancer; C34.90-Malignant neoplasm of unspecified part of unspecified bronchus or lung  COMPARISON: 12/13/2019 and 09/12/2019.  PROCEDURE: Axial images were obtained from the thoracic inlet through the pubic symphysis following the administration of Isovue 300 contrast.   FINDINGS:  CHEST: Soft tissue windows demonstrate significant interval improvement in patient's mediastinal adenopathy. For example, a 2.4 cm precarinal lymph node measures 1.7 cm on today's exam. A 1.9 cm AP window lymph node measures 9 mm. The previously noted right supraclavicular adenopathy has resolved. Also, the adenopathy in the posterior left base of the neck has also resolved. Heart size is normal. There are no pleural or pericardial effusions. Lung windows reveal centrilobular emphysema. The previously noted 2.8 cm mass in the medial right upper lobe measures approximately 1.5 cm on today's exam. No new or enlarging pulmonary nodules are identified. Bone windows reveal no lytic or destructive lesions.  ABDOMEN: The liver is homogeneous in appearance with no focal lesions. The spleen is unremarkable. The previously noted right adrenal mass is no longer seen. Also, the left adrenal nodule is no longer seen.  The pancreas is normal. The kidneys are unremarkable. The aorta is normal in caliber. There is no free fluid  or adenopathy. The abdominal portions of the GI tract are unremarkable.  PELVIS: The appendix is normal. The urinary bladder is unremarkable. There is no significant free fluid or adenopathy. Bone windows reveal no lytic or destructive lesions.      Impression: Significant interval decrease in size in the patient's medial right upper lobe lung mass with decrease/resolution of much of the adenopathy seen within the chest as well as resolution of the previously noted bilateral adrenal masses consistent with a treatment related response.  This study was performed with techniques to keep radiation doses as low as reasonably achievable (ALARA). Individualized dose reduction techniques using automated exposure control or adjustment of mA and/or kV according to the patient size were employed.  This report was finalized on 2/17/2020 2:12 PM by Pamela Oliveros M.D..    Ct Abdomen Pelvis With Contrast    Result Date: 2/17/2020  Narrative: PROCEDURE: CT CHEST W CONTRAST-, CT ABDOMEN PELVIS W CONTRAST-  HISTORY: Follow up extensive stage small cell lung cancer; C34.90-Malignant neoplasm of unspecified part of unspecified bronchus or lung  COMPARISON: 12/13/2019 and 09/12/2019.  PROCEDURE: Axial images were obtained from the thoracic inlet through the pubic symphysis following the administration of Isovue 300 contrast.   FINDINGS:  CHEST: Soft tissue windows demonstrate significant interval improvement in patient's mediastinal adenopathy. For example, a 2.4 cm precarinal lymph node measures 1.7 cm on today's exam. A 1.9 cm AP window lymph node measures 9 mm. The previously noted right supraclavicular adenopathy has resolved. Also, the adenopathy in the posterior left base of the neck has also resolved. Heart size is normal. There are no pleural or pericardial effusions. Lung windows reveal centrilobular emphysema. The previously noted 2.8 cm mass in the medial right upper lobe measures approximately 1.5 cm on today's exam. No  new or enlarging pulmonary nodules are identified. Bone windows reveal no lytic or destructive lesions.  ABDOMEN: The liver is homogeneous in appearance with no focal lesions. The spleen is unremarkable. The previously noted right adrenal mass is no longer seen. Also, the left adrenal nodule is no longer seen.  The pancreas is normal. The kidneys are unremarkable. The aorta is normal in caliber. There is no free fluid or adenopathy. The abdominal portions of the GI tract are unremarkable.  PELVIS: The appendix is normal. The urinary bladder is unremarkable. There is no significant free fluid or adenopathy. Bone windows reveal no lytic or destructive lesions.      Impression: Significant interval decrease in size in the patient's medial right upper lobe lung mass with decrease/resolution of much of the adenopathy seen within the chest as well as resolution of the previously noted bilateral adrenal masses consistent with a treatment related response.  This study was performed with techniques to keep radiation doses as low as reasonably achievable (ALARA). Individualized dose reduction techniques using automated exposure control or adjustment of mA and/or kV according to the patient size were employed.  This report was finalized on 2/17/2020 2:12 PM by Pamela Oliveros M.D..      ASSESSMENT: The patient is a very pleasant 69 y.o. female  with right upper lobe extensive stage small cell lung cancer     PROBLEM LIST:   1.  Right upper lobe extensive stage small cell lung cancer, [id number] stage IVb:  A.  Presented with right lower ribs pain  B.  CT chest revealed 3.5 cm right upper lobe lung mass, right adrenal nodule, mediastinal adenopathy, and left cervical lymphadenopathy.  C.  Status post left cervical lymph node biopsy done by Dr. Owen December 2, 2019  D.  Pathology consistent with small cell carcinoma  E.  Started palliative treatment with carboplatin and etoposide and Tecentriq December 15, 2019, status post 3  cycles  2.  COPD  3.  Hypertension  4.  Hypercholesterolemia  5.  Hypothyroid      PLAN:  1.  I will proceed with treatment as scheduled today carboplatin etoposide and Tecentriq cycle #4.  2.  The patient follow-up with us in 3 weeks for cycle #5.  3.  We will start maintenance Tecentriq with next cycle.  4.  I will go over the scan results with the patient I reassured the patient is having good response to treatment with significant pulmonary disease burden.  We will do 3 months follow-up study in 3 months which will be due May 2020.  5.  I will continue to monitor the patient blood work including blood counts kidney function liver function electrolytes function.  6.  I will continue the patient on Zofran as needed for chemotherapy-induced nausea.  7. We reviewed the potential side effects of this regimen including fatigue, vomiting and nausea, hair loss, nephropathy, neuropathy, hearing loss, myelosuppression, and risk of infusion reaction.  8. We reviewed potential side effects of immunotherapy including but not limited to immune mediated reactions with thyroiditis, pneumonitis, hepatitis, colitis, rash, and electrolytes abnormalities, fatigue, multiorgan failure, and possibly death.  9.  I will continue to monitor patient blood pressure will consider adjusting her antihypertensive agents if needed while she is on active cancer treatment.  10.  We will continue Synthroid daily for hypothyroid.  Patient is at risk for thyroiditis while she is on immunotherapy.      Rupesh Cao MD  2/19/2020

## 2020-02-20 ENCOUNTER — INFUSION (OUTPATIENT)
Dept: ONCOLOGY | Facility: HOSPITAL | Age: 70
End: 2020-02-20

## 2020-02-20 VITALS
WEIGHT: 134.6 LBS | HEART RATE: 89 BPM | SYSTOLIC BLOOD PRESSURE: 129 MMHG | TEMPERATURE: 98 F | DIASTOLIC BLOOD PRESSURE: 61 MMHG | RESPIRATION RATE: 16 BRPM | BODY MASS INDEX: 24.61 KG/M2

## 2020-02-20 DIAGNOSIS — C34.90 SCLC (SMALL CELL LUNG CARCINOMA) (HCC): Primary | ICD-10-CM

## 2020-02-20 PROCEDURE — 25010000003 HEPARIN LOCK FLUCH PER 10 UNITS: Performed by: INTERNAL MEDICINE

## 2020-02-20 PROCEDURE — 96413 CHEMO IV INFUSION 1 HR: CPT

## 2020-02-20 PROCEDURE — 25010000002 ETOPOSIDE 100 MG/5ML SOLUTION 5 ML VIAL: Performed by: INTERNAL MEDICINE

## 2020-02-20 PROCEDURE — 63710000001 PROCHLORPERAZINE MALEATE PER 10 MG: Performed by: INTERNAL MEDICINE

## 2020-02-20 RX ORDER — HEPARIN SODIUM (PORCINE) LOCK FLUSH IV SOLN 100 UNIT/ML 100 UNIT/ML
500 SOLUTION INTRAVENOUS AS NEEDED
Status: DISCONTINUED | OUTPATIENT
Start: 2020-02-20 | End: 2020-02-20 | Stop reason: HOSPADM

## 2020-02-20 RX ORDER — SODIUM CHLORIDE 0.9 % (FLUSH) 0.9 %
10 SYRINGE (ML) INJECTION AS NEEDED
Status: DISCONTINUED | OUTPATIENT
Start: 2020-02-20 | End: 2020-02-20 | Stop reason: HOSPADM

## 2020-02-20 RX ORDER — HEPARIN SODIUM (PORCINE) LOCK FLUSH IV SOLN 100 UNIT/ML 100 UNIT/ML
500 SOLUTION INTRAVENOUS AS NEEDED
Status: CANCELLED | OUTPATIENT
Start: 2020-02-20

## 2020-02-20 RX ORDER — SODIUM CHLORIDE 0.9 % (FLUSH) 0.9 %
10 SYRINGE (ML) INJECTION AS NEEDED
Status: CANCELLED | OUTPATIENT
Start: 2020-02-20

## 2020-02-20 RX ORDER — PROCHLORPERAZINE MALEATE 10 MG
10 TABLET ORAL ONCE
Status: COMPLETED | OUTPATIENT
Start: 2020-02-20 | End: 2020-02-20

## 2020-02-20 RX ORDER — SODIUM CHLORIDE 9 MG/ML
250 INJECTION, SOLUTION INTRAVENOUS ONCE
Status: DISCONTINUED | OUTPATIENT
Start: 2020-02-20 | End: 2020-02-20 | Stop reason: HOSPADM

## 2020-02-20 RX ADMIN — ETOPOSIDE 160 MG: 20 INJECTION, SOLUTION, CONCENTRATE INTRAVENOUS at 13:52

## 2020-02-20 RX ADMIN — PROCHLORPERAZINE MALEATE 10 MG: 10 TABLET, FILM COATED ORAL at 13:35

## 2020-02-20 RX ADMIN — HEPARIN 500 UNITS: 100 SYRINGE at 14:53

## 2020-02-20 RX ADMIN — SODIUM CHLORIDE, PRESERVATIVE FREE 10 ML: 5 INJECTION INTRAVENOUS at 14:53

## 2020-02-21 ENCOUNTER — INFUSION (OUTPATIENT)
Dept: ONCOLOGY | Facility: HOSPITAL | Age: 70
End: 2020-02-21

## 2020-02-21 VITALS
WEIGHT: 135.9 LBS | TEMPERATURE: 97.7 F | DIASTOLIC BLOOD PRESSURE: 98 MMHG | SYSTOLIC BLOOD PRESSURE: 135 MMHG | HEART RATE: 80 BPM | RESPIRATION RATE: 16 BRPM | BODY MASS INDEX: 24.85 KG/M2 | OXYGEN SATURATION: 99 %

## 2020-02-21 DIAGNOSIS — C34.90 SCLC (SMALL CELL LUNG CARCINOMA) (HCC): Primary | ICD-10-CM

## 2020-02-21 PROCEDURE — 25010000002 ETOPOSIDE 100 MG/5ML SOLUTION 5 ML VIAL: Performed by: INTERNAL MEDICINE

## 2020-02-21 PROCEDURE — 63710000001 PROCHLORPERAZINE MALEATE PER 10 MG: Performed by: INTERNAL MEDICINE

## 2020-02-21 PROCEDURE — 96413 CHEMO IV INFUSION 1 HR: CPT

## 2020-02-21 PROCEDURE — 25010000003 HEPARIN LOCK FLUCH PER 10 UNITS: Performed by: INTERNAL MEDICINE

## 2020-02-21 RX ORDER — PROCHLORPERAZINE MALEATE 10 MG
10 TABLET ORAL ONCE
Status: COMPLETED | OUTPATIENT
Start: 2020-02-21 | End: 2020-02-21

## 2020-02-21 RX ORDER — HEPARIN SODIUM (PORCINE) LOCK FLUSH IV SOLN 100 UNIT/ML 100 UNIT/ML
500 SOLUTION INTRAVENOUS AS NEEDED
Status: CANCELLED | OUTPATIENT
Start: 2020-02-21

## 2020-02-21 RX ORDER — SODIUM CHLORIDE 9 MG/ML
250 INJECTION, SOLUTION INTRAVENOUS ONCE
Status: DISCONTINUED | OUTPATIENT
Start: 2020-02-21 | End: 2020-02-21 | Stop reason: HOSPADM

## 2020-02-21 RX ORDER — HEPARIN SODIUM (PORCINE) LOCK FLUSH IV SOLN 100 UNIT/ML 100 UNIT/ML
500 SOLUTION INTRAVENOUS AS NEEDED
Status: DISCONTINUED | OUTPATIENT
Start: 2020-02-21 | End: 2020-02-21 | Stop reason: HOSPADM

## 2020-02-21 RX ORDER — SODIUM CHLORIDE 0.9 % (FLUSH) 0.9 %
10 SYRINGE (ML) INJECTION AS NEEDED
Status: CANCELLED | OUTPATIENT
Start: 2020-02-21

## 2020-02-21 RX ORDER — SODIUM CHLORIDE 0.9 % (FLUSH) 0.9 %
10 SYRINGE (ML) INJECTION AS NEEDED
Status: DISCONTINUED | OUTPATIENT
Start: 2020-02-21 | End: 2020-02-21 | Stop reason: HOSPADM

## 2020-02-21 RX ADMIN — SODIUM CHLORIDE, PRESERVATIVE FREE 10 ML: 5 INJECTION INTRAVENOUS at 14:46

## 2020-02-21 RX ADMIN — HEPARIN 500 UNITS: 100 SYRINGE at 14:46

## 2020-02-21 RX ADMIN — PROCHLORPERAZINE MALEATE 10 MG: 10 TABLET, FILM COATED ORAL at 13:40

## 2020-02-21 RX ADMIN — ETOPOSIDE 160 MG: 20 INJECTION, SOLUTION, CONCENTRATE INTRAVENOUS at 13:43

## 2020-03-09 ENCOUNTER — OFFICE VISIT (OUTPATIENT)
Dept: PULMONOLOGY | Facility: CLINIC | Age: 70
End: 2020-03-09

## 2020-03-09 VITALS
DIASTOLIC BLOOD PRESSURE: 74 MMHG | WEIGHT: 136 LBS | RESPIRATION RATE: 18 BRPM | OXYGEN SATURATION: 96 % | HEART RATE: 89 BPM | BODY MASS INDEX: 25.03 KG/M2 | SYSTOLIC BLOOD PRESSURE: 122 MMHG | HEIGHT: 62 IN

## 2020-03-09 DIAGNOSIS — J44.9 CHRONIC OBSTRUCTIVE PULMONARY DISEASE, UNSPECIFIED COPD TYPE (HCC): ICD-10-CM

## 2020-03-09 DIAGNOSIS — C34.90 SMALL CELL LUNG CANCER IN ADULT (HCC): Primary | ICD-10-CM

## 2020-03-09 DIAGNOSIS — F17.200 SMOKING: ICD-10-CM

## 2020-03-09 DIAGNOSIS — J43.2 CENTRILOBULAR EMPHYSEMA (HCC): ICD-10-CM

## 2020-03-09 DIAGNOSIS — R06.02 SOB (SHORTNESS OF BREATH): ICD-10-CM

## 2020-03-09 PROCEDURE — 99213 OFFICE O/P EST LOW 20 MIN: CPT | Performed by: INTERNAL MEDICINE

## 2020-03-09 NOTE — PROGRESS NOTES
"Chief Complaint   Patient presents with   • Follow-up   • Shortness of Breath       Subjective   Prashanth Prajapati is a 69 y.o. female.     History of Present Illness   Patient comes today for follow up of shortness of breath and COPD.     Patient says that her symptoms have been stable since the last clinic visit. she reports no recent exacerbations.     Patient is using medications, as prescribed. Exercise tolerance has also remained stable.     Continues to smoke 1/2 pack per day.    She is being treated for SCLC with Chemo.       The following portions of the patient's history were reviewed and updated as appropriate: allergies, current medications, past family history, past medical history, past social history and past surgical history.    Review of Systems   HENT: Negative for rhinorrhea, sinus pressure, sneezing and sore throat.    Respiratory: Positive for cough (at night). Negative for shortness of breath and wheezing.        Objective   Visit Vitals  /74   Pulse 89   Resp 18   Ht 157.5 cm (62.01\")   Wt 61.7 kg (136 lb)   SpO2 96%   BMI 24.87 kg/m²       Physical Exam   Constitutional: She appears well-developed and well-nourished.   Eyes: EOM are normal.   Cardiovascular: Normal rate.   Port in place.    Pulmonary/Chest:   Effort was normal  Somewhat hyperresonant to percussion  Somewhat decreased A/E with out wheezing noted.   Musculoskeletal: She exhibits no edema.   Neurological: She is alert.   Psychiatric: She has a normal mood and affect.   Vitals reviewed.      Assessment/Plan   Prashanth was seen today for follow-up and shortness of breath.    Diagnoses and all orders for this visit:    Small cell lung cancer in adult (CMS/HCC)    SOB (shortness of breath)  -     Pulmonary Function Test; Future    Chronic obstructive pulmonary disease, unspecified COPD type (CMS/HCC)  -     Pulmonary Function Test; Future    Centrilobular emphysema (CMS/HCC)  -     Alpha - 1 - Antitrypsin Phenotype; Future  - "     Pulmonary Function Test; Future    Smoking    Other orders  -     tiotropium bromide-olodaterol (STIOLTO RESPIMAT) 2.5-2.5 MCG/ACT aerosol solution inhaler; Inhale 2 puffs Daily.         Return in about 6 months (around 9/9/2020) for Recheck, Overbook, PFT F/U.    DISCUSSION (if any):  Will order AAT level today.    Last PFTs showed Mild COPD.  These were performed in 2019.    We have reviewed her pulmonary medications in great detail.    Any needed adjustments to her pulmonary medications, either for clinical or insurance coverage reasons, have been made and are reflected in the orders.    Compliance with medications stressed.     Side effects of prescribed medications discussed with the patient    Patient was strongly encouraged to quit smoking as soon as possible.    She was asked to follow up with Dr. Cao regularly.     PFTs will be ordered for follow up visit.       Dictated utilizing Dragon dictation.    This document was electronically signed by Stefany Olvera MD on 03/09/20 at 2:31 PM

## 2020-03-10 NOTE — PROGRESS NOTES
DATE OF VISIT: 3/11/2020    REASON FOR VISIT: Followup for extensive stage small cell lung cancer     HISTORY OF PRESENT ILLNESS: The patient is a very pleasant 69 y.o. female  with past medical history significant for extensive stage small cell lung cancer diagnosed December 2, 2019.  She was started on palliative treatment with carboplatin and etoposide and Tecentriq December 15, 2019. The patient is here today for follow-up visit with treatment cycle #5.    SUBJECTIVE: The patient is here today by herself.  She is doing well. Her breathing has improved significantly. Denies nausea and vomiting. Denies fevers and headaches.     PAST MEDICAL HISTORY/SOCIAL HISTORY/FAMILY HISTORY: Reviewed by me and unchanged from my documentation done on 03/11/20.    Review of Systems   Constitutional: Negative for activity change, appetite change, chills, fatigue, fever and unexpected weight change.   HENT: Negative for hearing loss, mouth sores, nosebleeds, sore throat and trouble swallowing.    Eyes: Negative for visual disturbance.   Respiratory: Negative for cough, chest tightness, shortness of breath and wheezing.    Cardiovascular: Negative for chest pain, palpitations and leg swelling.   Gastrointestinal: Positive for nausea. Negative for abdominal distention, abdominal pain, blood in stool, constipation, diarrhea, rectal pain and vomiting.   Endocrine: Negative for cold intolerance and heat intolerance.   Genitourinary: Negative for difficulty urinating, dysuria, frequency and urgency.   Musculoskeletal: Negative for arthralgias, back pain, gait problem, joint swelling and myalgias.   Skin: Negative for rash.   Neurological: Negative for dizziness, tremors, syncope, weakness, light-headedness, numbness and headaches.   Hematological: Negative for adenopathy. Does not bruise/bleed easily.   Psychiatric/Behavioral: Negative for confusion, sleep disturbance and suicidal ideas. The patient is not nervous/anxious.           Current Outpatient Medications:   •  acetaminophen (TYLENOL) 500 MG tablet, Take 500 mg by mouth Every 6 (Six) Hours As Needed for Mild Pain ., Disp: , Rfl:   •  amLODIPine (NORVASC) 5 MG tablet, Take 5 mg by mouth Daily., Disp: , Rfl:   •  baclofen (LIORESAL) 10 MG tablet, 1 TABLET BY MOUTH THREE TIMES A DAY START WITH ONE PILL NIGHTLY FOR A WEEK, THEN INCREASE TO TWICE A DAY FOR A WEEK, THEN THREE TIMES A DAY, Disp: 270 tablet, Rfl: 1  •  Calcium Carbonate-Vitamin D (CALCIUM-CARB 600 + D) 600-125 MG-UNIT tablet, Take 500 mg by mouth 2 (Two) Times a Day., Disp: 60 each, Rfl: 5  •  diclofenac (VOLTAREN) 75 MG EC tablet, Take 75 mg by mouth Daily As Needed., Disp: , Rfl:   •  gabapentin (NEURONTIN) 300 MG capsule, Take 1 capsule by mouth Daily With Breakfast & Lunch AND 2 capsules Every Night., Disp: 360 capsule, Rfl: 1  •  HYDROcodone-acetaminophen (NORCO) 7.5-325 MG per tablet, Take 1 tablet by mouth Every 4 (Four) Hours As Needed for Moderate Pain, Disp: 10 tablet, Rfl: 0  •  levothyroxine (SYNTHROID, LEVOTHROID) 125 MCG tablet, Take 1 tablet by mouth Daily., Disp: 90 tablet, Rfl: 3  •  lidocaine (LIDODERM) 5 %, Place 1 patch on the skin as directed by provider Daily. Remove & Discard patch within 12 hours or as directed by MD, Disp: 90 each, Rfl: 3  •  Lidocaine Viscous HCl (XYLOCAINE) 2 % solution, , Disp: , Rfl:   •  lidocaine-prilocaine (EMLA) 2.5-2.5 % cream, Apply  topically to the appropriate area as directed As Needed (45-60 minutes prior to port access.  Cover with saran/plastic wrap.)., Disp: 30 g, Rfl: 3  •  magic mouthwash oral suspension, Swish and spit (or swallow) 1-2 Teaspoonfuls (5-10ml) 4 times a day as needed, Disp: 240 mL, Rfl: 3  •  omeprazole (priLOSEC) 40 MG capsule, Take 1 capsule by mouth Daily., Disp: 90 capsule, Rfl: 2  •  ondansetron (ZOFRAN) 8 MG tablet, Take 1 tablet by mouth 3 (Three) Times a Day As Needed for Nausea or Vomiting., Disp: 30 tablet, Rfl: 5  •  pravastatin  "(PRAVACHOL) 40 MG tablet, Take 40 mg by mouth Daily., Disp: , Rfl:   •  tiotropium bromide-olodaterol (STIOLTO RESPIMAT) 2.5-2.5 MCG/ACT aerosol solution inhaler, Inhale 2 puffs Daily., Disp: 1 inhaler, Rfl: 5  •  traMADol (ULTRAM) 50 MG tablet, Take 50 mg by mouth 3 (Three) Times a Day., Disp: , Rfl: 3    PHYSICAL EXAMINATION:   /74   Pulse 103   Temp 97.9 °F (36.6 °C) (Temporal)   Resp 16   Ht 157.5 cm (62.01\")   Wt 59.9 kg (132 lb)   SpO2 98%   BMI 24.14 kg/m²    ECOG Performance Status: 1 - Symptomatic but completely ambulatory  General Appearance:  alert, cooperative, no apparent distress and appears stated age   Neurologic/Psychiatric: A&O x 3, gait steady, appropriate affect, strength 5/5 in all muscle groups   HEENT:  Normocephalic, without obvious abnormality, mucous membranes moist   Neck: Supple, symmetrical, trachea midline, no adenopathy;  No thyromegaly, masses, or tenderness   Lungs:   Clear to auscultation bilaterally; respirations regular, even, and unlabored bilaterally   Heart:  Regular rate and rhythm, no murmurs appreciated   Abdomen:   Soft, non-tender, non-distended and no organomegaly   Lymph nodes: No cervical, supraclavicular, inguinal or axillary adenopathy noted   Extremities: Normal, atraumatic; no clubbing, cyanosis, or edema    Skin: No rashes, ulcers, or suspicious lesions noted     Infusion on 03/11/2020   Component Date Value Ref Range Status   • Glucose 03/11/2020 105* 65 - 99 mg/dL Final   • BUN 03/11/2020 10  8 - 23 mg/dL Final   • Creatinine 03/11/2020 0.64  0.57 - 1.00 mg/dL Final   • Sodium 03/11/2020 135* 136 - 145 mmol/L Final   • Potassium 03/11/2020 4.4  3.5 - 5.2 mmol/L Final   • Chloride 03/11/2020 100  98 - 107 mmol/L Final   • CO2 03/11/2020 24.3  22.0 - 29.0 mmol/L Final   • Calcium 03/11/2020 9.4  8.6 - 10.5 mg/dL Final   • Total Protein 03/11/2020 7.4  6.0 - 8.5 g/dL Final   • Albumin 03/11/2020 4.10  3.50 - 5.20 g/dL Final   • ALT (SGPT) 03/11/2020 9  " 1 - 33 U/L Final   • AST (SGOT) 03/11/2020 15  1 - 32 U/L Final   • Alkaline Phosphatase 03/11/2020 89  39 - 117 U/L Final   • Total Bilirubin 03/11/2020 <0.2* 0.2 - 1.2 mg/dL Final   • eGFR Non African Amer 03/11/2020 93  >60 mL/min/1.73 Final   • Globulin 03/11/2020 3.3  gm/dL Final   • A/G Ratio 03/11/2020 1.2  g/dL Final   • BUN/Creatinine Ratio 03/11/2020 15.6  7.0 - 25.0 Final   • Anion Gap 03/11/2020 10.7  5.0 - 15.0 mmol/L Final   • WBC 03/11/2020 4.32  3.40 - 10.80 10*3/mm3 Final   • RBC 03/11/2020 2.95* 3.77 - 5.28 10*6/mm3 Final   • Hemoglobin 03/11/2020 9.6* 12.0 - 15.9 g/dL Final   • Hematocrit 03/11/2020 29.4* 34.0 - 46.6 % Final   • MCV 03/11/2020 99.7* 79.0 - 97.0 fL Final   • MCH 03/11/2020 32.5  26.6 - 33.0 pg Final   • MCHC 03/11/2020 32.7  31.5 - 35.7 g/dL Final   • RDW 03/11/2020 21.2* 12.3 - 15.4 % Final   • RDW-SD 03/11/2020 74.6* 37.0 - 54.0 fl Final   • MPV 03/11/2020 9.8  6.0 - 12.0 fL Final   • Platelets 03/11/2020 174  140 - 450 10*3/mm3 Final   • Scan Slide 03/11/2020    Final    See Manual Differential Results   • Neutrophil % 03/11/2020 29.0* 42.7 - 76.0 % Final   • Lymphocyte % 03/11/2020 39.0  19.6 - 45.3 % Final   • Monocyte % 03/11/2020 20.0* 5.0 - 12.0 % Final   • Eosinophil % 03/11/2020 2.0  0.3 - 6.2 % Final   • Bands %  03/11/2020 10.0* 0.0 - 5.0 % Final   • Neutrophils Absolute 03/11/2020 1.68* 1.70 - 7.00 10*3/mm3 Final   • Lymphocytes Absolute 03/11/2020 1.68  0.70 - 3.10 10*3/mm3 Final   • Monocytes Absolute 03/11/2020 0.86  0.10 - 0.90 10*3/mm3 Final   • Eosinophils Absolute 03/11/2020 0.09  0.00 - 0.40 10*3/mm3 Final   • Anisocytosis 03/11/2020 Slight/1+  None Seen Final   • WBC Morphology 03/11/2020 Normal  Normal Final   • Platelet Estimate 03/11/2020 Adequate  Normal Final        Ct Chest With Contrast    Result Date: 2/17/2020  Narrative: PROCEDURE: CT CHEST W CONTRAST-, CT ABDOMEN PELVIS W CONTRAST-  HISTORY: Follow up extensive stage small cell lung cancer;  C34.90-Malignant neoplasm of unspecified part of unspecified bronchus or lung  COMPARISON: 12/13/2019 and 09/12/2019.  PROCEDURE: Axial images were obtained from the thoracic inlet through the pubic symphysis following the administration of Isovue 300 contrast.   FINDINGS:  CHEST: Soft tissue windows demonstrate significant interval improvement in patient's mediastinal adenopathy. For example, a 2.4 cm precarinal lymph node measures 1.7 cm on today's exam. A 1.9 cm AP window lymph node measures 9 mm. The previously noted right supraclavicular adenopathy has resolved. Also, the adenopathy in the posterior left base of the neck has also resolved. Heart size is normal. There are no pleural or pericardial effusions. Lung windows reveal centrilobular emphysema. The previously noted 2.8 cm mass in the medial right upper lobe measures approximately 1.5 cm on today's exam. No new or enlarging pulmonary nodules are identified. Bone windows reveal no lytic or destructive lesions.  ABDOMEN: The liver is homogeneous in appearance with no focal lesions. The spleen is unremarkable. The previously noted right adrenal mass is no longer seen. Also, the left adrenal nodule is no longer seen.  The pancreas is normal. The kidneys are unremarkable. The aorta is normal in caliber. There is no free fluid or adenopathy. The abdominal portions of the GI tract are unremarkable.  PELVIS: The appendix is normal. The urinary bladder is unremarkable. There is no significant free fluid or adenopathy. Bone windows reveal no lytic or destructive lesions.      Impression: Significant interval decrease in size in the patient's medial right upper lobe lung mass with decrease/resolution of much of the adenopathy seen within the chest as well as resolution of the previously noted bilateral adrenal masses consistent with a treatment related response.  This study was performed with techniques to keep radiation doses as low as reasonably achievable  (ALARA). Individualized dose reduction techniques using automated exposure control or adjustment of mA and/or kV according to the patient size were employed.  This report was finalized on 2/17/2020 2:12 PM by Pamela Oliveros M.D..    Ct Abdomen Pelvis With Contrast    Result Date: 2/17/2020  Narrative: PROCEDURE: CT CHEST W CONTRAST-, CT ABDOMEN PELVIS W CONTRAST-  HISTORY: Follow up extensive stage small cell lung cancer; C34.90-Malignant neoplasm of unspecified part of unspecified bronchus or lung  COMPARISON: 12/13/2019 and 09/12/2019.  PROCEDURE: Axial images were obtained from the thoracic inlet through the pubic symphysis following the administration of Isovue 300 contrast.   FINDINGS:  CHEST: Soft tissue windows demonstrate significant interval improvement in patient's mediastinal adenopathy. For example, a 2.4 cm precarinal lymph node measures 1.7 cm on today's exam. A 1.9 cm AP window lymph node measures 9 mm. The previously noted right supraclavicular adenopathy has resolved. Also, the adenopathy in the posterior left base of the neck has also resolved. Heart size is normal. There are no pleural or pericardial effusions. Lung windows reveal centrilobular emphysema. The previously noted 2.8 cm mass in the medial right upper lobe measures approximately 1.5 cm on today's exam. No new or enlarging pulmonary nodules are identified. Bone windows reveal no lytic or destructive lesions.  ABDOMEN: The liver is homogeneous in appearance with no focal lesions. The spleen is unremarkable. The previously noted right adrenal mass is no longer seen. Also, the left adrenal nodule is no longer seen.  The pancreas is normal. The kidneys are unremarkable. The aorta is normal in caliber. There is no free fluid or adenopathy. The abdominal portions of the GI tract are unremarkable.  PELVIS: The appendix is normal. The urinary bladder is unremarkable. There is no significant free fluid or adenopathy. Bone windows reveal no  lytic or destructive lesions.      Impression: Significant interval decrease in size in the patient's medial right upper lobe lung mass with decrease/resolution of much of the adenopathy seen within the chest as well as resolution of the previously noted bilateral adrenal masses consistent with a treatment related response.  This study was performed with techniques to keep radiation doses as low as reasonably achievable (ALARA). Individualized dose reduction techniques using automated exposure control or adjustment of mA and/or kV according to the patient size were employed.  This report was finalized on 2/17/2020 2:12 PM by Pamela Oliveros M.D..    .Advance Care Planning   ACP discussion was held with the patient during this visit. Patient does not have an advance directive, declines further assistance.      ASSESSMENT: The patient is a very pleasant 69 y.o. female  with right upper lobe extensive stage small cell lung cancer     PROBLEM LIST:   1.  Right upper lobe extensive stage small cell lung cancer, [id number] stage IVb:  A.  Presented with right lower ribs pain  B.  CT chest revealed 3.5 cm right upper lobe lung mass, right adrenal nodule, mediastinal adenopathy, and left cervical lymphadenopathy.  C.  Status post left cervical lymph node biopsy done by Dr. Owen December 2, 2019  D.  Pathology consistent with small cell carcinoma  E.  Started palliative treatment with carboplatin and etoposide and Tecentriq December 15, 2019, status post 4 cycles  2.  COPD  3.  Hypertension  4.  Hypercholesterolemia  5.  Hypothyroid  6. GERD      PLAN:  1.  I will proceed with treatment as scheduled today maintenace Tecentriq cycle #5.  2.  The patient follow-up with us in 3 weeks for cycle #6.  3.  We will do 3 months follow-up study in 3 months which will be due May 2020.  4.  I will continue to monitor the patient blood work including blood counts kidney function liver function electrolytes function.  5.  I will continue  the patient on Zofran as needed for chemotherapy-induced nausea.  6. We reviewed the potential side effects of this regimen including fatigue, vomiting and nausea, hair loss, nephropathy, neuropathy, hearing loss, myelosuppression, and risk of infusion reaction.  7. We reviewed potential side effects of immunotherapy including but not limited to immune mediated reactions with thyroiditis, pneumonitis, hepatitis, colitis, rash, and electrolytes abnormalities, fatigue, multiorgan failure, and possibly death.  8.  I will continue to monitor patient blood pressure will consider adjusting her antihypertensive agents if needed while she is on active cancer treatment.  9.  We will continue Synthroid daily for hypothyroid.  Patient is at risk for thyroiditis while she is on immunotherapy.  10. We will continue omeprazole for GERD. I refilled her prescription today.       Kathe Higuera, APRN  3/11/2020

## 2020-03-11 ENCOUNTER — INFUSION (OUTPATIENT)
Dept: ONCOLOGY | Facility: HOSPITAL | Age: 70
End: 2020-03-11

## 2020-03-11 ENCOUNTER — OFFICE VISIT (OUTPATIENT)
Dept: ONCOLOGY | Facility: CLINIC | Age: 70
End: 2020-03-11

## 2020-03-11 VITALS
DIASTOLIC BLOOD PRESSURE: 74 MMHG | TEMPERATURE: 97.9 F | BODY MASS INDEX: 24.29 KG/M2 | OXYGEN SATURATION: 98 % | HEART RATE: 103 BPM | RESPIRATION RATE: 16 BRPM | WEIGHT: 132 LBS | SYSTOLIC BLOOD PRESSURE: 152 MMHG | HEIGHT: 62 IN

## 2020-03-11 DIAGNOSIS — C34.90 SCLC (SMALL CELL LUNG CARCINOMA) (HCC): ICD-10-CM

## 2020-03-11 DIAGNOSIS — J43.2 CENTRILOBULAR EMPHYSEMA (HCC): Primary | ICD-10-CM

## 2020-03-11 LAB
ALBUMIN SERPL-MCNC: 4.1 G/DL (ref 3.5–5.2)
ALBUMIN/GLOB SERPL: 1.2 G/DL
ALP SERPL-CCNC: 89 U/L (ref 39–117)
ALT SERPL W P-5'-P-CCNC: 9 U/L (ref 1–33)
ANION GAP SERPL CALCULATED.3IONS-SCNC: 10.7 MMOL/L (ref 5–15)
ANISOCYTOSIS BLD QL: ABNORMAL
AST SERPL-CCNC: 15 U/L (ref 1–32)
BILIRUB SERPL-MCNC: <0.2 MG/DL (ref 0.2–1.2)
BUN BLD-MCNC: 10 MG/DL (ref 8–23)
BUN/CREAT SERPL: 15.6 (ref 7–25)
CALCIUM SPEC-SCNC: 9.4 MG/DL (ref 8.6–10.5)
CHLORIDE SERPL-SCNC: 100 MMOL/L (ref 98–107)
CO2 SERPL-SCNC: 24.3 MMOL/L (ref 22–29)
CREAT BLD-MCNC: 0.64 MG/DL (ref 0.57–1)
DEPRECATED RDW RBC AUTO: 74.6 FL (ref 37–54)
EOSINOPHIL # BLD MANUAL: 0.09 10*3/MM3 (ref 0–0.4)
EOSINOPHIL NFR BLD MANUAL: 2 % (ref 0.3–6.2)
ERYTHROCYTE [DISTWIDTH] IN BLOOD BY AUTOMATED COUNT: 21.2 % (ref 12.3–15.4)
GFR SERPL CREATININE-BSD FRML MDRD: 93 ML/MIN/1.73
GLOBULIN UR ELPH-MCNC: 3.3 GM/DL
GLUCOSE BLD-MCNC: 105 MG/DL (ref 65–99)
HCT VFR BLD AUTO: 29.4 % (ref 34–46.6)
HGB BLD-MCNC: 9.6 G/DL (ref 12–15.9)
LYMPHOCYTES # BLD MANUAL: 1.68 10*3/MM3 (ref 0.7–3.1)
LYMPHOCYTES NFR BLD MANUAL: 20 % (ref 5–12)
LYMPHOCYTES NFR BLD MANUAL: 39 % (ref 19.6–45.3)
MCH RBC QN AUTO: 32.5 PG (ref 26.6–33)
MCHC RBC AUTO-ENTMCNC: 32.7 G/DL (ref 31.5–35.7)
MCV RBC AUTO: 99.7 FL (ref 79–97)
MONOCYTES # BLD AUTO: 0.86 10*3/MM3 (ref 0.1–0.9)
NEUTROPHILS # BLD AUTO: 1.68 10*3/MM3 (ref 1.7–7)
NEUTROPHILS NFR BLD MANUAL: 29 % (ref 42.7–76)
NEUTS BAND NFR BLD MANUAL: 10 % (ref 0–5)
PLATELET # BLD AUTO: 174 10*3/MM3 (ref 140–450)
PMV BLD AUTO: 9.8 FL (ref 6–12)
POTASSIUM BLD-SCNC: 4.4 MMOL/L (ref 3.5–5.2)
PROT SERPL-MCNC: 7.4 G/DL (ref 6–8.5)
RBC # BLD AUTO: 2.95 10*6/MM3 (ref 3.77–5.28)
SCAN SLIDE: NORMAL
SMALL PLATELETS BLD QL SMEAR: ADEQUATE
SODIUM BLD-SCNC: 135 MMOL/L (ref 136–145)
T3FREE SERPL-MCNC: 2.92 PG/ML (ref 2–4.4)
T4 FREE SERPL-MCNC: 1.27 NG/DL (ref 0.93–1.7)
TSH SERPL DL<=0.05 MIU/L-ACNC: 1.46 UIU/ML (ref 0.27–4.2)
WBC MORPH BLD: NORMAL
WBC NRBC COR # BLD: 4.32 10*3/MM3 (ref 3.4–10.8)

## 2020-03-11 PROCEDURE — 99214 OFFICE O/P EST MOD 30 MIN: CPT | Performed by: NURSE PRACTITIONER

## 2020-03-11 PROCEDURE — 96375 TX/PRO/DX INJ NEW DRUG ADDON: CPT

## 2020-03-11 PROCEDURE — 25010000002 ATEZOLIZUMAB 1200 MG/20ML SOLUTION 20 ML VIAL: Performed by: NURSE PRACTITIONER

## 2020-03-11 PROCEDURE — 85007 BL SMEAR W/DIFF WBC COUNT: CPT | Performed by: NURSE PRACTITIONER

## 2020-03-11 PROCEDURE — 82103 ALPHA-1-ANTITRYPSIN TOTAL: CPT | Performed by: INTERNAL MEDICINE

## 2020-03-11 PROCEDURE — 96367 TX/PROPH/DG ADDL SEQ IV INF: CPT

## 2020-03-11 PROCEDURE — 84439 ASSAY OF FREE THYROXINE: CPT | Performed by: NURSE PRACTITIONER

## 2020-03-11 PROCEDURE — 82104 ALPHA-1-ANTITRYPSIN PHENO: CPT | Performed by: INTERNAL MEDICINE

## 2020-03-11 PROCEDURE — 84481 FREE ASSAY (FT-3): CPT | Performed by: NURSE PRACTITIONER

## 2020-03-11 PROCEDURE — 85025 COMPLETE CBC W/AUTO DIFF WBC: CPT | Performed by: NURSE PRACTITIONER

## 2020-03-11 PROCEDURE — 96413 CHEMO IV INFUSION 1 HR: CPT

## 2020-03-11 PROCEDURE — 80053 COMPREHEN METABOLIC PANEL: CPT | Performed by: NURSE PRACTITIONER

## 2020-03-11 PROCEDURE — 25010000002 ONDANSETRON PER 1 MG: Performed by: NURSE PRACTITIONER

## 2020-03-11 PROCEDURE — 36415 COLL VENOUS BLD VENIPUNCTURE: CPT

## 2020-03-11 PROCEDURE — 84443 ASSAY THYROID STIM HORMONE: CPT | Performed by: NURSE PRACTITIONER

## 2020-03-11 PROCEDURE — 25010000003 HEPARIN LOCK FLUCH PER 10 UNITS: Performed by: INTERNAL MEDICINE

## 2020-03-11 RX ORDER — SODIUM CHLORIDE 0.9 % (FLUSH) 0.9 %
10 SYRINGE (ML) INJECTION AS NEEDED
Status: DISCONTINUED | OUTPATIENT
Start: 2020-03-11 | End: 2020-03-11 | Stop reason: HOSPADM

## 2020-03-11 RX ORDER — SODIUM CHLORIDE 0.9 % (FLUSH) 0.9 %
10 SYRINGE (ML) INJECTION AS NEEDED
Status: CANCELLED | OUTPATIENT
Start: 2020-03-11

## 2020-03-11 RX ORDER — HEPARIN SODIUM (PORCINE) LOCK FLUSH IV SOLN 100 UNIT/ML 100 UNIT/ML
500 SOLUTION INTRAVENOUS AS NEEDED
Status: CANCELLED | OUTPATIENT
Start: 2020-03-11

## 2020-03-11 RX ORDER — SODIUM CHLORIDE 9 MG/ML
250 INJECTION, SOLUTION INTRAVENOUS ONCE
Status: DISCONTINUED | OUTPATIENT
Start: 2020-03-11 | End: 2020-03-11 | Stop reason: HOSPADM

## 2020-03-11 RX ORDER — LIDOCAINE HYDROCHLORIDE 20 MG/ML
SOLUTION OROPHARYNGEAL
COMMUNITY
Start: 2020-02-18

## 2020-03-11 RX ORDER — SODIUM CHLORIDE 9 MG/ML
250 INJECTION, SOLUTION INTRAVENOUS ONCE
Status: CANCELLED | OUTPATIENT
Start: 2020-03-11

## 2020-03-11 RX ORDER — HEPARIN SODIUM (PORCINE) LOCK FLUSH IV SOLN 100 UNIT/ML 100 UNIT/ML
500 SOLUTION INTRAVENOUS AS NEEDED
Status: DISCONTINUED | OUTPATIENT
Start: 2020-03-11 | End: 2020-03-11 | Stop reason: HOSPADM

## 2020-03-11 RX ORDER — OMEPRAZOLE 40 MG/1
40 CAPSULE, DELAYED RELEASE ORAL DAILY
Qty: 90 CAPSULE | Refills: 2 | Status: SHIPPED | OUTPATIENT
Start: 2020-03-11 | End: 2020-12-15

## 2020-03-11 RX ADMIN — ONDANSETRON 16 MG: 2 INJECTION INTRAMUSCULAR; INTRAVENOUS at 09:16

## 2020-03-11 RX ADMIN — SODIUM CHLORIDE, PRESERVATIVE FREE 10 ML: 5 INJECTION INTRAVENOUS at 10:17

## 2020-03-11 RX ADMIN — HEPARIN 500 UNITS: 100 SYRINGE at 10:17

## 2020-03-11 RX ADMIN — ATEZOLIZUMAB 1200 MG: 1200 INJECTION, SOLUTION INTRAVENOUS at 09:35

## 2020-03-16 LAB
A1AT SERPL-MCNC: 147 MG/DL (ref 101–187)
PHENOTYPE: NORMAL

## 2020-03-30 DIAGNOSIS — M25.50 CHRONIC JOINT PAIN: ICD-10-CM

## 2020-03-30 DIAGNOSIS — G89.29 CHRONIC JOINT PAIN: ICD-10-CM

## 2020-03-30 RX ORDER — TRAMADOL HYDROCHLORIDE 50 MG/1
50 TABLET ORAL 3 TIMES DAILY
Qty: 90 TABLET | Refills: 1 | Status: SHIPPED | OUTPATIENT
Start: 2020-03-30 | End: 2020-06-17

## 2020-04-01 ENCOUNTER — OFFICE VISIT (OUTPATIENT)
Dept: ONCOLOGY | Facility: CLINIC | Age: 70
End: 2020-04-01

## 2020-04-01 ENCOUNTER — INFUSION (OUTPATIENT)
Dept: ONCOLOGY | Facility: HOSPITAL | Age: 70
End: 2020-04-01

## 2020-04-01 VITALS
SYSTOLIC BLOOD PRESSURE: 134 MMHG | WEIGHT: 133 LBS | DIASTOLIC BLOOD PRESSURE: 81 MMHG | TEMPERATURE: 98.7 F | HEIGHT: 62 IN | HEART RATE: 94 BPM | OXYGEN SATURATION: 98 % | RESPIRATION RATE: 16 BRPM | BODY MASS INDEX: 24.48 KG/M2

## 2020-04-01 DIAGNOSIS — C34.90 SCLC (SMALL CELL LUNG CARCINOMA) (HCC): Primary | ICD-10-CM

## 2020-04-01 DIAGNOSIS — C34.90 SCLC (SMALL CELL LUNG CARCINOMA) (HCC): ICD-10-CM

## 2020-04-01 LAB
ALBUMIN SERPL-MCNC: 4 G/DL (ref 3.5–5.2)
ALBUMIN/GLOB SERPL: 1 G/DL
ALP SERPL-CCNC: 90 U/L (ref 39–117)
ALT SERPL W P-5'-P-CCNC: 10 U/L (ref 1–33)
ANION GAP SERPL CALCULATED.3IONS-SCNC: 11.5 MMOL/L (ref 5–15)
AST SERPL-CCNC: 17 U/L (ref 1–32)
BASOPHILS # BLD AUTO: 0.03 10*3/MM3 (ref 0–0.2)
BASOPHILS NFR BLD AUTO: 0.4 % (ref 0–1.5)
BILIRUB SERPL-MCNC: 0.2 MG/DL (ref 0.2–1.2)
BUN BLD-MCNC: 12 MG/DL (ref 8–23)
BUN/CREAT SERPL: 16.9 (ref 7–25)
CALCIUM SPEC-SCNC: 9.4 MG/DL (ref 8.6–10.5)
CHLORIDE SERPL-SCNC: 94 MMOL/L (ref 98–107)
CO2 SERPL-SCNC: 24.5 MMOL/L (ref 22–29)
CREAT BLD-MCNC: 0.71 MG/DL (ref 0.57–1)
DEPRECATED RDW RBC AUTO: 64.1 FL (ref 37–54)
EOSINOPHIL # BLD AUTO: 0.16 10*3/MM3 (ref 0–0.4)
EOSINOPHIL NFR BLD AUTO: 2.1 % (ref 0.3–6.2)
ERYTHROCYTE [DISTWIDTH] IN BLOOD BY AUTOMATED COUNT: 17.3 % (ref 12.3–15.4)
GFR SERPL CREATININE-BSD FRML MDRD: 82 ML/MIN/1.73
GLOBULIN UR ELPH-MCNC: 3.9 GM/DL
GLUCOSE BLD-MCNC: 121 MG/DL (ref 65–99)
HCT VFR BLD AUTO: 32.2 % (ref 34–46.6)
HGB BLD-MCNC: 10.6 G/DL (ref 12–15.9)
IMM GRANULOCYTES # BLD AUTO: 0.02 10*3/MM3 (ref 0–0.05)
IMM GRANULOCYTES NFR BLD AUTO: 0.3 % (ref 0–0.5)
LYMPHOCYTES # BLD AUTO: 1.75 10*3/MM3 (ref 0.7–3.1)
LYMPHOCYTES NFR BLD AUTO: 23 % (ref 19.6–45.3)
MCH RBC QN AUTO: 33.1 PG (ref 26.6–33)
MCHC RBC AUTO-ENTMCNC: 32.9 G/DL (ref 31.5–35.7)
MCV RBC AUTO: 100.6 FL (ref 79–97)
MONOCYTES # BLD AUTO: 1.08 10*3/MM3 (ref 0.1–0.9)
MONOCYTES NFR BLD AUTO: 14.2 % (ref 5–12)
NEUTROPHILS # BLD AUTO: 4.58 10*3/MM3 (ref 1.7–7)
NEUTROPHILS NFR BLD AUTO: 60 % (ref 42.7–76)
NRBC BLD AUTO-RTO: 0 /100 WBC (ref 0–0.2)
PLATELET # BLD AUTO: 276 10*3/MM3 (ref 140–450)
PMV BLD AUTO: 9.2 FL (ref 6–12)
POTASSIUM BLD-SCNC: 4.5 MMOL/L (ref 3.5–5.2)
PROT SERPL-MCNC: 7.9 G/DL (ref 6–8.5)
RBC # BLD AUTO: 3.2 10*6/MM3 (ref 3.77–5.28)
SODIUM BLD-SCNC: 130 MMOL/L (ref 136–145)
WBC NRBC COR # BLD: 7.62 10*3/MM3 (ref 3.4–10.8)

## 2020-04-01 PROCEDURE — 25010000003 HEPARIN LOCK FLUCH PER 10 UNITS: Performed by: INTERNAL MEDICINE

## 2020-04-01 PROCEDURE — 25010000002 ONDANSETRON PER 1 MG: Performed by: NURSE PRACTITIONER

## 2020-04-01 PROCEDURE — 96375 TX/PRO/DX INJ NEW DRUG ADDON: CPT

## 2020-04-01 PROCEDURE — 25010000002 ATEZOLIZUMAB 1200 MG/20ML SOLUTION 20 ML VIAL: Performed by: NURSE PRACTITIONER

## 2020-04-01 PROCEDURE — 80053 COMPREHEN METABOLIC PANEL: CPT

## 2020-04-01 PROCEDURE — 85025 COMPLETE CBC W/AUTO DIFF WBC: CPT

## 2020-04-01 PROCEDURE — 96413 CHEMO IV INFUSION 1 HR: CPT

## 2020-04-01 PROCEDURE — 36415 COLL VENOUS BLD VENIPUNCTURE: CPT

## 2020-04-01 PROCEDURE — 99214 OFFICE O/P EST MOD 30 MIN: CPT | Performed by: NURSE PRACTITIONER

## 2020-04-01 PROCEDURE — 96366 THER/PROPH/DIAG IV INF ADDON: CPT

## 2020-04-01 RX ORDER — HEPARIN SODIUM (PORCINE) LOCK FLUSH IV SOLN 100 UNIT/ML 100 UNIT/ML
500 SOLUTION INTRAVENOUS AS NEEDED
Status: DISCONTINUED | OUTPATIENT
Start: 2020-04-01 | End: 2020-04-01 | Stop reason: HOSPADM

## 2020-04-01 RX ORDER — HEPARIN SODIUM (PORCINE) LOCK FLUSH IV SOLN 100 UNIT/ML 100 UNIT/ML
500 SOLUTION INTRAVENOUS AS NEEDED
Status: CANCELLED | OUTPATIENT
Start: 2020-04-01

## 2020-04-01 RX ORDER — SODIUM CHLORIDE 0.9 % (FLUSH) 0.9 %
10 SYRINGE (ML) INJECTION AS NEEDED
Status: DISCONTINUED | OUTPATIENT
Start: 2020-04-01 | End: 2020-04-01 | Stop reason: HOSPADM

## 2020-04-01 RX ORDER — SODIUM CHLORIDE 9 MG/ML
250 INJECTION, SOLUTION INTRAVENOUS ONCE
Status: CANCELLED | OUTPATIENT
Start: 2020-04-01

## 2020-04-01 RX ORDER — SODIUM CHLORIDE 9 MG/ML
250 INJECTION, SOLUTION INTRAVENOUS ONCE
Status: DISCONTINUED | OUTPATIENT
Start: 2020-04-01 | End: 2020-04-01 | Stop reason: HOSPADM

## 2020-04-01 RX ORDER — SODIUM CHLORIDE 0.9 % (FLUSH) 0.9 %
10 SYRINGE (ML) INJECTION AS NEEDED
Status: CANCELLED | OUTPATIENT
Start: 2020-04-01

## 2020-04-01 RX ADMIN — ONDANSETRON 16 MG: 2 INJECTION INTRAMUSCULAR; INTRAVENOUS at 11:55

## 2020-04-01 RX ADMIN — ATEZOLIZUMAB 1200 MG: 1200 INJECTION, SOLUTION INTRAVENOUS at 12:21

## 2020-04-01 RX ADMIN — SODIUM CHLORIDE, PRESERVATIVE FREE 10 ML: 5 INJECTION INTRAVENOUS at 12:53

## 2020-04-01 RX ADMIN — HEPARIN 500 UNITS: 100 SYRINGE at 12:53

## 2020-04-01 NOTE — PROGRESS NOTES
DATE OF VISIT: 4/1/2020    REASON FOR VISIT: Followup for extensive stage small cell lung cancer     HISTORY OF PRESENT ILLNESS: The patient is a very pleasant 69 y.o. female with past medical history significant for extensive stage small cell lung cancer diagnosed December 2, 2019.  She was started on palliative treatment with carboplatin and etoposide and Tecentriq December 15, 2019. Tecentriq maintenance started 03/11/2020. The patient is here today for follow-up visit with treatment cycle #6.    SUBJECTIVE: The patient is here today by herself.  She is doing well. Energy has improved. Her breathing id good. No shortness of breath. Denies cough. Denies nausea and vomiting. Denies fevers and headaches.     PAST MEDICAL HISTORY/SOCIAL HISTORY/FAMILY HISTORY: Reviewed by me and unchanged from my documentation done on 04/01/20.    Review of Systems   Constitutional: Negative for activity change, appetite change, chills, fatigue, fever and unexpected weight change.   HENT: Negative for hearing loss, mouth sores, nosebleeds, sore throat and trouble swallowing.    Eyes: Negative for visual disturbance.   Respiratory: Negative for cough, chest tightness, shortness of breath and wheezing.    Cardiovascular: Negative for chest pain, palpitations and leg swelling.   Gastrointestinal: Negative for abdominal distention, abdominal pain, blood in stool, constipation, diarrhea, nausea, rectal pain and vomiting.   Endocrine: Negative for cold intolerance and heat intolerance.   Genitourinary: Negative for difficulty urinating, dysuria, frequency and urgency.   Musculoskeletal: Negative for arthralgias, back pain, gait problem, joint swelling and myalgias.   Skin: Negative for rash.   Neurological: Negative for dizziness, tremors, syncope, weakness, light-headedness, numbness and headaches.   Hematological: Negative for adenopathy. Does not bruise/bleed easily.   Psychiatric/Behavioral: Negative for confusion, sleep disturbance  and suicidal ideas. The patient is not nervous/anxious.          Current Outpatient Medications:   •  acetaminophen (TYLENOL) 500 MG tablet, Take 500 mg by mouth Every 6 (Six) Hours As Needed for Mild Pain ., Disp: , Rfl:   •  amLODIPine (NORVASC) 5 MG tablet, Take 5 mg by mouth Daily., Disp: , Rfl:   •  baclofen (LIORESAL) 10 MG tablet, 1 TABLET BY MOUTH THREE TIMES A DAY START WITH ONE PILL NIGHTLY FOR A WEEK, THEN INCREASE TO TWICE A DAY FOR A WEEK, THEN THREE TIMES A DAY, Disp: 270 tablet, Rfl: 1  •  Calcium Carbonate-Vitamin D (CALCIUM-CARB 600 + D) 600-125 MG-UNIT tablet, Take 500 mg by mouth 2 (Two) Times a Day., Disp: 60 each, Rfl: 5  •  diclofenac (VOLTAREN) 75 MG EC tablet, Take 75 mg by mouth Daily As Needed., Disp: , Rfl:   •  gabapentin (NEURONTIN) 300 MG capsule, Take 1 capsule by mouth Daily With Breakfast & Lunch AND 2 capsules Every Night., Disp: 360 capsule, Rfl: 1  •  HYDROcodone-acetaminophen (NORCO) 7.5-325 MG per tablet, Take 1 tablet by mouth Every 4 (Four) Hours As Needed for Moderate Pain, Disp: 10 tablet, Rfl: 0  •  levothyroxine (SYNTHROID, LEVOTHROID) 125 MCG tablet, Take 1 tablet by mouth Daily., Disp: 90 tablet, Rfl: 3  •  lidocaine (LIDODERM) 5 %, Place 1 patch on the skin as directed by provider Daily. Remove & Discard patch within 12 hours or as directed by MD, Disp: 90 each, Rfl: 3  •  Lidocaine Viscous HCl (XYLOCAINE) 2 % solution, , Disp: , Rfl:   •  lidocaine-prilocaine (EMLA) 2.5-2.5 % cream, Apply  topically to the appropriate area as directed As Needed (45-60 minutes prior to port access.  Cover with saran/plastic wrap.)., Disp: 30 g, Rfl: 3  •  magic mouthwash oral suspension, Swish and spit (or swallow) 1-2 Teaspoonfuls (5-10ml) 4 times a day as needed, Disp: 240 mL, Rfl: 3  •  omeprazole (priLOSEC) 40 MG capsule, Take 1 capsule by mouth Daily., Disp: 90 capsule, Rfl: 2  •  ondansetron (ZOFRAN) 8 MG tablet, Take 1 tablet by mouth 3 (Three) Times a Day As Needed for Nausea or  "Vomiting., Disp: 30 tablet, Rfl: 5  •  pravastatin (PRAVACHOL) 40 MG tablet, Take 40 mg by mouth Daily., Disp: , Rfl:   •  tiotropium bromide-olodaterol (STIOLTO RESPIMAT) 2.5-2.5 MCG/ACT aerosol solution inhaler, Inhale 2 puffs Daily., Disp: 1 inhaler, Rfl: 5  •  traMADol (ULTRAM) 50 MG tablet, Take 1 tablet by mouth 3 (Three) Times a Day., Disp: 90 tablet, Rfl: 1    PHYSICAL EXAMINATION:   /81   Pulse 94   Temp 98.7 °F (37.1 °C) (Oral)   Resp 16   Ht 157.5 cm (62.01\")   Wt 60.3 kg (133 lb)   SpO2 98%   BMI 24.32 kg/m²    ECOG Performance Status: 1 - Symptomatic but completely ambulatory  General Appearance:  alert, cooperative, no apparent distress and appears stated age   Neurologic/Psychiatric: A&O x 3, gait steady, appropriate affect, strength 5/5 in all muscle groups   HEENT:  Normocephalic, without obvious abnormality, mucous membranes moist   Neck: Supple, symmetrical, trachea midline, no adenopathy;  No thyromegaly, masses, or tenderness   Lungs:   Clear to auscultation bilaterally; respirations regular, even, and unlabored bilaterally   Heart:  Regular rate and rhythm, no murmurs appreciated   Abdomen:   Soft, non-tender, non-distended and no organomegaly   Lymph nodes: No cervical, supraclavicular, inguinal or axillary adenopathy noted   Extremities: Normal, atraumatic; no clubbing, cyanosis, or edema    Skin: No rashes, ulcers, or suspicious lesions noted     No visits with results within 2 Week(s) from this visit.   Latest known visit with results is:   Infusion on 03/11/2020   Component Date Value Ref Range Status   • A-1 Antitrypsin 03/11/2020 147  101 - 187 mg/dL Final   • Phenotype 03/11/2020 MS   Final           Phenotype   Population      A-1-AT Concentration                     Incidence %      Reference Interval         MM            86.5%                96 - 189         MS             8.0%                83 - 161         MZ             3.9%                60 - 111         FM        "      0.4%                93 - 191         SZ             0.3%                42 -  75         SS             0.1%                62 - 119         ZZ             0.05%               16 -  38         FS             0.05%               70 - 128         FZ            Unknown              44 -  88         FF            Unknown              Unknown   • TSH 03/11/2020 1.460  0.270 - 4.200 uIU/mL Final   • Glucose 03/11/2020 105* 65 - 99 mg/dL Final   • BUN 03/11/2020 10  8 - 23 mg/dL Final   • Creatinine 03/11/2020 0.64  0.57 - 1.00 mg/dL Final   • Sodium 03/11/2020 135* 136 - 145 mmol/L Final   • Potassium 03/11/2020 4.4  3.5 - 5.2 mmol/L Final   • Chloride 03/11/2020 100  98 - 107 mmol/L Final   • CO2 03/11/2020 24.3  22.0 - 29.0 mmol/L Final   • Calcium 03/11/2020 9.4  8.6 - 10.5 mg/dL Final   • Total Protein 03/11/2020 7.4  6.0 - 8.5 g/dL Final   • Albumin 03/11/2020 4.10  3.50 - 5.20 g/dL Final   • ALT (SGPT) 03/11/2020 9  1 - 33 U/L Final   • AST (SGOT) 03/11/2020 15  1 - 32 U/L Final   • Alkaline Phosphatase 03/11/2020 89  39 - 117 U/L Final   • Total Bilirubin 03/11/2020 <0.2* 0.2 - 1.2 mg/dL Final   • eGFR Non African Amer 03/11/2020 93  >60 mL/min/1.73 Final   • Globulin 03/11/2020 3.3  gm/dL Final   • A/G Ratio 03/11/2020 1.2  g/dL Final   • BUN/Creatinine Ratio 03/11/2020 15.6  7.0 - 25.0 Final   • Anion Gap 03/11/2020 10.7  5.0 - 15.0 mmol/L Final   • T3, Free 03/11/2020 2.92  2.00 - 4.40 pg/mL Final   • Free T4 03/11/2020 1.27  0.93 - 1.70 ng/dL Final   • WBC 03/11/2020 4.32  3.40 - 10.80 10*3/mm3 Final   • RBC 03/11/2020 2.95* 3.77 - 5.28 10*6/mm3 Final   • Hemoglobin 03/11/2020 9.6* 12.0 - 15.9 g/dL Final   • Hematocrit 03/11/2020 29.4* 34.0 - 46.6 % Final   • MCV 03/11/2020 99.7* 79.0 - 97.0 fL Final   • MCH 03/11/2020 32.5  26.6 - 33.0 pg Final   • MCHC 03/11/2020 32.7  31.5 - 35.7 g/dL Final   • RDW 03/11/2020 21.2* 12.3 - 15.4 % Final   • RDW-SD 03/11/2020 74.6* 37.0 - 54.0 fl Final   • MPV 03/11/2020  9.8  6.0 - 12.0 fL Final   • Platelets 03/11/2020 174  140 - 450 10*3/mm3 Final   • Scan Slide 03/11/2020    Final    See Manual Differential Results   • Neutrophil % 03/11/2020 29.0* 42.7 - 76.0 % Final   • Lymphocyte % 03/11/2020 39.0  19.6 - 45.3 % Final   • Monocyte % 03/11/2020 20.0* 5.0 - 12.0 % Final   • Eosinophil % 03/11/2020 2.0  0.3 - 6.2 % Final   • Bands %  03/11/2020 10.0* 0.0 - 5.0 % Final   • Neutrophils Absolute 03/11/2020 1.68* 1.70 - 7.00 10*3/mm3 Final   • Lymphocytes Absolute 03/11/2020 1.68  0.70 - 3.10 10*3/mm3 Final   • Monocytes Absolute 03/11/2020 0.86  0.10 - 0.90 10*3/mm3 Final   • Eosinophils Absolute 03/11/2020 0.09  0.00 - 0.40 10*3/mm3 Final   • Anisocytosis 03/11/2020 Slight/1+  None Seen Final   • WBC Morphology 03/11/2020 Normal  Normal Final   • Platelet Estimate 03/11/2020 Adequate  Normal Final          ASSESSMENT: The patient is a very pleasant 69 y.o. female  with right upper lobe extensive stage small cell lung cancer     PROBLEM LIST:   1.  Right upper lobe extensive stage small cell lung cancer, [id number] stage IVb:  A.  Presented with right lower ribs pain  B.  CT chest revealed 3.5 cm right upper lobe lung mass, right adrenal nodule, mediastinal adenopathy, and left cervical lymphadenopathy.  C.  Status post left cervical lymph node biopsy done by Dr. Owen December 2, 2019  D.  Pathology consistent with small cell carcinoma  E.  Started palliative treatment with carboplatin and etoposide and Tecentriq December 15, 2019, status post 5 cycles  2.  COPD  3.  Hypertension  4.  Hypercholesterolemia  5.  Hypothyroid  6. GERD      PLAN:  1.  I will proceed with treatment as scheduled today maintenace Tecentriq cycle #6.  2.  The patient follow-up with us in 3 weeks for cycle #7.  3.  We will do 3 months follow-up study in 3 months which will be due May 2020.  4.  I will continue to monitor the patient blood work including blood counts kidney function liver function electrolytes  function.  5.  I will continue the patient on Zofran as needed for chemotherapy-induced nausea.  6. We reviewed the potential side effects of this regimen including fatigue, vomiting and nausea, hair loss, nephropathy, neuropathy, hearing loss, myelosuppression, and risk of infusion reaction.  7. We reviewed potential side effects of immunotherapy including but not limited to immune mediated reactions with thyroiditis, pneumonitis, hepatitis, colitis, rash, and electrolytes abnormalities, fatigue, multiorgan failure, and possibly death.  8.  I will continue to monitor patient blood pressure will consider adjusting her antihypertensive agents if needed while she is on active cancer treatment.  9.  We will continue Synthroid daily for hypothyroid.  Patient is at risk for thyroiditis while she is on immunotherapy.  10. We will continue omeprazole for GERD.    Kathe Higuera, APRN  4/1/2020

## 2020-04-22 ENCOUNTER — OFFICE VISIT (OUTPATIENT)
Dept: ONCOLOGY | Facility: CLINIC | Age: 70
End: 2020-04-22

## 2020-04-22 ENCOUNTER — INFUSION (OUTPATIENT)
Dept: ONCOLOGY | Facility: HOSPITAL | Age: 70
End: 2020-04-22

## 2020-04-22 VITALS
BODY MASS INDEX: 24.8 KG/M2 | RESPIRATION RATE: 16 BRPM | TEMPERATURE: 98.5 F | SYSTOLIC BLOOD PRESSURE: 145 MMHG | WEIGHT: 134.8 LBS | HEART RATE: 84 BPM | HEIGHT: 62 IN | OXYGEN SATURATION: 97 % | DIASTOLIC BLOOD PRESSURE: 74 MMHG

## 2020-04-22 DIAGNOSIS — C34.90 SCLC (SMALL CELL LUNG CARCINOMA) (HCC): Primary | ICD-10-CM

## 2020-04-22 LAB
ALBUMIN SERPL-MCNC: 3.7 G/DL (ref 3.5–5.2)
ALBUMIN/GLOB SERPL: 0.9 G/DL
ALP SERPL-CCNC: 92 U/L (ref 39–117)
ALT SERPL W P-5'-P-CCNC: 9 U/L (ref 1–33)
ANION GAP SERPL CALCULATED.3IONS-SCNC: 10.9 MMOL/L (ref 5–15)
AST SERPL-CCNC: 18 U/L (ref 1–32)
BASOPHILS # BLD AUTO: 0.04 10*3/MM3 (ref 0–0.2)
BASOPHILS NFR BLD AUTO: 0.5 % (ref 0–1.5)
BILIRUB SERPL-MCNC: 0.2 MG/DL (ref 0.2–1.2)
BUN BLD-MCNC: 12 MG/DL (ref 8–23)
BUN/CREAT SERPL: 16.4 (ref 7–25)
CALCIUM SPEC-SCNC: 9.2 MG/DL (ref 8.6–10.5)
CHLORIDE SERPL-SCNC: 94 MMOL/L (ref 98–107)
CO2 SERPL-SCNC: 25.1 MMOL/L (ref 22–29)
CREAT BLD-MCNC: 0.73 MG/DL (ref 0.57–1)
DEPRECATED RDW RBC AUTO: 54.8 FL (ref 37–54)
EOSINOPHIL # BLD AUTO: 0.12 10*3/MM3 (ref 0–0.4)
EOSINOPHIL NFR BLD AUTO: 1.4 % (ref 0.3–6.2)
ERYTHROCYTE [DISTWIDTH] IN BLOOD BY AUTOMATED COUNT: 14.8 % (ref 12.3–15.4)
GFR SERPL CREATININE-BSD FRML MDRD: 80 ML/MIN/1.73
GLOBULIN UR ELPH-MCNC: 4 GM/DL
GLUCOSE BLD-MCNC: 112 MG/DL (ref 65–99)
HCT VFR BLD AUTO: 33 % (ref 34–46.6)
HGB BLD-MCNC: 10.5 G/DL (ref 12–15.9)
IMM GRANULOCYTES # BLD AUTO: 0.02 10*3/MM3 (ref 0–0.05)
IMM GRANULOCYTES NFR BLD AUTO: 0.2 % (ref 0–0.5)
LYMPHOCYTES # BLD AUTO: 1.84 10*3/MM3 (ref 0.7–3.1)
LYMPHOCYTES NFR BLD AUTO: 21 % (ref 19.6–45.3)
MCH RBC QN AUTO: 31.7 PG (ref 26.6–33)
MCHC RBC AUTO-ENTMCNC: 31.8 G/DL (ref 31.5–35.7)
MCV RBC AUTO: 99.7 FL (ref 79–97)
MONOCYTES # BLD AUTO: 1.23 10*3/MM3 (ref 0.1–0.9)
MONOCYTES NFR BLD AUTO: 14 % (ref 5–12)
NEUTROPHILS # BLD AUTO: 5.51 10*3/MM3 (ref 1.7–7)
NEUTROPHILS NFR BLD AUTO: 62.9 % (ref 42.7–76)
NRBC BLD AUTO-RTO: 0 /100 WBC (ref 0–0.2)
PLATELET # BLD AUTO: 231 10*3/MM3 (ref 140–450)
PMV BLD AUTO: 8.9 FL (ref 6–12)
POTASSIUM BLD-SCNC: 4.2 MMOL/L (ref 3.5–5.2)
PROT SERPL-MCNC: 7.7 G/DL (ref 6–8.5)
RBC # BLD AUTO: 3.31 10*6/MM3 (ref 3.77–5.28)
SODIUM BLD-SCNC: 130 MMOL/L (ref 136–145)
T3FREE SERPL-MCNC: 2.93 PG/ML (ref 2–4.4)
T4 FREE SERPL-MCNC: 1.6 NG/DL (ref 0.93–1.7)
TSH SERPL DL<=0.05 MIU/L-ACNC: 0.4 UIU/ML (ref 0.27–4.2)
WBC NRBC COR # BLD: 8.76 10*3/MM3 (ref 3.4–10.8)

## 2020-04-22 PROCEDURE — 96413 CHEMO IV INFUSION 1 HR: CPT

## 2020-04-22 PROCEDURE — 80053 COMPREHEN METABOLIC PANEL: CPT

## 2020-04-22 PROCEDURE — 85025 COMPLETE CBC W/AUTO DIFF WBC: CPT

## 2020-04-22 PROCEDURE — 96366 THER/PROPH/DIAG IV INF ADDON: CPT

## 2020-04-22 PROCEDURE — 25010000002 ATEZOLIZUMAB 1200 MG/20ML SOLUTION 20 ML VIAL: Performed by: INTERNAL MEDICINE

## 2020-04-22 PROCEDURE — 96367 TX/PROPH/DG ADDL SEQ IV INF: CPT

## 2020-04-22 PROCEDURE — 84481 FREE ASSAY (FT-3): CPT

## 2020-04-22 PROCEDURE — 36415 COLL VENOUS BLD VENIPUNCTURE: CPT

## 2020-04-22 PROCEDURE — 99214 OFFICE O/P EST MOD 30 MIN: CPT | Performed by: INTERNAL MEDICINE

## 2020-04-22 PROCEDURE — 25010000002 ONDANSETRON PER 1 MG: Performed by: INTERNAL MEDICINE

## 2020-04-22 PROCEDURE — 84439 ASSAY OF FREE THYROXINE: CPT

## 2020-04-22 PROCEDURE — 25010000003 HEPARIN LOCK FLUCH PER 10 UNITS: Performed by: INTERNAL MEDICINE

## 2020-04-22 PROCEDURE — 84443 ASSAY THYROID STIM HORMONE: CPT

## 2020-04-22 RX ORDER — SODIUM CHLORIDE 9 MG/ML
250 INJECTION, SOLUTION INTRAVENOUS ONCE
Status: CANCELLED | OUTPATIENT
Start: 2020-05-13

## 2020-04-22 RX ORDER — SODIUM CHLORIDE 9 MG/ML
250 INJECTION, SOLUTION INTRAVENOUS ONCE
Status: CANCELLED | OUTPATIENT
Start: 2020-04-22

## 2020-04-22 RX ORDER — HEPARIN SODIUM (PORCINE) LOCK FLUSH IV SOLN 100 UNIT/ML 100 UNIT/ML
500 SOLUTION INTRAVENOUS AS NEEDED
Status: CANCELLED | OUTPATIENT
Start: 2020-04-22

## 2020-04-22 RX ORDER — SODIUM CHLORIDE 0.9 % (FLUSH) 0.9 %
10 SYRINGE (ML) INJECTION AS NEEDED
Status: DISCONTINUED | OUTPATIENT
Start: 2020-04-22 | End: 2020-04-22 | Stop reason: HOSPADM

## 2020-04-22 RX ORDER — SODIUM CHLORIDE 0.9 % (FLUSH) 0.9 %
10 SYRINGE (ML) INJECTION AS NEEDED
Status: CANCELLED | OUTPATIENT
Start: 2020-04-22

## 2020-04-22 RX ORDER — SODIUM CHLORIDE 9 MG/ML
250 INJECTION, SOLUTION INTRAVENOUS ONCE
Status: DISCONTINUED | OUTPATIENT
Start: 2020-04-22 | End: 2020-04-22 | Stop reason: HOSPADM

## 2020-04-22 RX ORDER — HEPARIN SODIUM (PORCINE) LOCK FLUSH IV SOLN 100 UNIT/ML 100 UNIT/ML
500 SOLUTION INTRAVENOUS AS NEEDED
Status: DISCONTINUED | OUTPATIENT
Start: 2020-04-22 | End: 2020-04-22 | Stop reason: HOSPADM

## 2020-04-22 RX ADMIN — HEPARIN 500 UNITS: 100 SYRINGE at 11:27

## 2020-04-22 RX ADMIN — ONDANSETRON 16 MG: 2 INJECTION INTRAMUSCULAR; INTRAVENOUS at 10:36

## 2020-04-22 RX ADMIN — ATEZOLIZUMAB 1200 MG: 1200 INJECTION, SOLUTION INTRAVENOUS at 10:53

## 2020-04-22 RX ADMIN — SODIUM CHLORIDE, PRESERVATIVE FREE 10 ML: 5 INJECTION INTRAVENOUS at 11:27

## 2020-04-22 NOTE — PROGRESS NOTES
DATE OF VISIT: 4/22/2020    REASON FOR VISIT: Followup for extensive stage small cell lung cancer     HISTORY OF PRESENT ILLNESS: The patient is a very pleasant 69 y.o. female with past medical history significant for extensive stage small cell lung cancer diagnosed December 2, 2019.  She was started on palliative treatment with carboplatin and etoposide and Tecentriq December 15, 2019. Tecentriq maintenance started 03/11/2020. The patient is here today for follow-up visit with treatment cycle #7.    SUBJECTIVE: The patient is here today by herself.  She has been able to tolerate immunotherapy fairly well she denied fever chills night sweats.    PAST MEDICAL HISTORY/SOCIAL HISTORY/FAMILY HISTORY: Reviewed by me and unchanged from my documentation done on 04/22/20.    Review of Systems   Constitutional: Negative for activity change, appetite change, chills, fatigue, fever and unexpected weight change.   HENT: Negative for hearing loss, mouth sores, nosebleeds, sore throat and trouble swallowing.    Eyes: Negative for visual disturbance.   Respiratory: Negative for cough, chest tightness, shortness of breath and wheezing.    Cardiovascular: Negative for chest pain, palpitations and leg swelling.   Gastrointestinal: Negative for abdominal distention, abdominal pain, blood in stool, constipation, diarrhea, nausea, rectal pain and vomiting.   Endocrine: Negative for cold intolerance and heat intolerance.   Genitourinary: Negative for difficulty urinating, dysuria, frequency and urgency.   Musculoskeletal: Negative for arthralgias, back pain, gait problem, joint swelling and myalgias.   Skin: Negative for rash.   Neurological: Negative for dizziness, tremors, syncope, weakness, light-headedness, numbness and headaches.   Hematological: Negative for adenopathy. Does not bruise/bleed easily.   Psychiatric/Behavioral: Negative for confusion, sleep disturbance and suicidal ideas. The patient is not nervous/anxious.           Current Outpatient Medications:   •  acetaminophen (TYLENOL) 500 MG tablet, Take 500 mg by mouth Every 6 (Six) Hours As Needed for Mild Pain ., Disp: , Rfl:   •  amLODIPine (NORVASC) 5 MG tablet, Take 5 mg by mouth Daily., Disp: , Rfl:   •  baclofen (LIORESAL) 10 MG tablet, 1 TABLET BY MOUTH THREE TIMES A DAY START WITH ONE PILL NIGHTLY FOR A WEEK, THEN INCREASE TO TWICE A DAY FOR A WEEK, THEN THREE TIMES A DAY, Disp: 270 tablet, Rfl: 1  •  Calcium Carbonate-Vitamin D (CALCIUM-CARB 600 + D) 600-125 MG-UNIT tablet, Take 500 mg by mouth 2 (Two) Times a Day., Disp: 60 each, Rfl: 5  •  diclofenac (VOLTAREN) 75 MG EC tablet, Take 75 mg by mouth Daily As Needed., Disp: , Rfl:   •  gabapentin (NEURONTIN) 300 MG capsule, Take 1 capsule by mouth Daily With Breakfast & Lunch AND 2 capsules Every Night., Disp: 360 capsule, Rfl: 1  •  HYDROcodone-acetaminophen (NORCO) 7.5-325 MG per tablet, Take 1 tablet by mouth Every 4 (Four) Hours As Needed for Moderate Pain, Disp: 10 tablet, Rfl: 0  •  levothyroxine (SYNTHROID, LEVOTHROID) 125 MCG tablet, Take 1 tablet by mouth Daily., Disp: 90 tablet, Rfl: 3  •  lidocaine (LIDODERM) 5 %, Place 1 patch on the skin as directed by provider Daily. Remove & Discard patch within 12 hours or as directed by MD, Disp: 90 each, Rfl: 3  •  Lidocaine Viscous HCl (XYLOCAINE) 2 % solution, , Disp: , Rfl:   •  lidocaine-prilocaine (EMLA) 2.5-2.5 % cream, Apply  topically to the appropriate area as directed As Needed (45-60 minutes prior to port access.  Cover with saran/plastic wrap.)., Disp: 30 g, Rfl: 3  •  magic mouthwash oral suspension, Swish and spit (or swallow) 1-2 Teaspoonfuls (5-10ml) 4 times a day as needed, Disp: 240 mL, Rfl: 3  •  omeprazole (priLOSEC) 40 MG capsule, Take 1 capsule by mouth Daily., Disp: 90 capsule, Rfl: 2  •  ondansetron (ZOFRAN) 8 MG tablet, Take 1 tablet by mouth 3 (Three) Times a Day As Needed for Nausea or Vomiting., Disp: 30 tablet, Rfl: 5  •  pravastatin  "(PRAVACHOL) 40 MG tablet, Take 40 mg by mouth Daily., Disp: , Rfl:   •  tiotropium bromide-olodaterol (STIOLTO RESPIMAT) 2.5-2.5 MCG/ACT aerosol solution inhaler, Inhale 2 puffs Daily., Disp: 1 inhaler, Rfl: 5  •  traMADol (ULTRAM) 50 MG tablet, Take 1 tablet by mouth 3 (Three) Times a Day., Disp: 90 tablet, Rfl: 1    PHYSICAL EXAMINATION:   /74   Pulse 84   Temp 98.5 °F (36.9 °C) (Temporal)   Resp 16   Ht 157.5 cm (62.01\")   Wt 61.1 kg (134 lb 12.8 oz)   SpO2 97%   BMI 24.65 kg/m²    ECOG Performance Status: 1 - Symptomatic but completely ambulatory  General Appearance:  alert, cooperative, no apparent distress and appears stated age   Neurologic/Psychiatric: A&O x 3, gait steady, appropriate affect, strength 5/5 in all muscle groups   HEENT:  Normocephalic, without obvious abnormality, mucous membranes moist   Neck: Supple, symmetrical, trachea midline, no adenopathy;  No thyromegaly, masses, or tenderness   Lungs:   Clear to auscultation bilaterally; respirations regular, even, and unlabored bilaterally   Heart:  Regular rate and rhythm, no murmurs appreciated   Abdomen:   Soft, non-tender, non-distended and no organomegaly   Lymph nodes: No cervical, supraclavicular, inguinal or axillary adenopathy noted   Extremities: Normal, atraumatic; no clubbing, cyanosis, or edema    Skin: No rashes, ulcers, or suspicious lesions noted     No visits with results within 2 Week(s) from this visit.   Latest known visit with results is:   Infusion on 04/01/2020   Component Date Value Ref Range Status   • Glucose 04/01/2020 121* 65 - 99 mg/dL Final   • BUN 04/01/2020 12  8 - 23 mg/dL Final   • Creatinine 04/01/2020 0.71  0.57 - 1.00 mg/dL Final   • Sodium 04/01/2020 130* 136 - 145 mmol/L Final   • Potassium 04/01/2020 4.5  3.5 - 5.2 mmol/L Final   • Chloride 04/01/2020 94* 98 - 107 mmol/L Final   • CO2 04/01/2020 24.5  22.0 - 29.0 mmol/L Final   • Calcium 04/01/2020 9.4  8.6 - 10.5 mg/dL Final   • Total Protein " 04/01/2020 7.9  6.0 - 8.5 g/dL Final   • Albumin 04/01/2020 4.00  3.50 - 5.20 g/dL Final   • ALT (SGPT) 04/01/2020 10  1 - 33 U/L Final   • AST (SGOT) 04/01/2020 17  1 - 32 U/L Final   • Alkaline Phosphatase 04/01/2020 90  39 - 117 U/L Final   • Total Bilirubin 04/01/2020 0.2  0.2 - 1.2 mg/dL Final   • eGFR Non African Amer 04/01/2020 82  >60 mL/min/1.73 Final   • Globulin 04/01/2020 3.9  gm/dL Final   • A/G Ratio 04/01/2020 1.0  g/dL Final   • BUN/Creatinine Ratio 04/01/2020 16.9  7.0 - 25.0 Final   • Anion Gap 04/01/2020 11.5  5.0 - 15.0 mmol/L Final   • WBC 04/01/2020 7.62  3.40 - 10.80 10*3/mm3 Final   • RBC 04/01/2020 3.20* 3.77 - 5.28 10*6/mm3 Final   • Hemoglobin 04/01/2020 10.6* 12.0 - 15.9 g/dL Final   • Hematocrit 04/01/2020 32.2* 34.0 - 46.6 % Final   • MCV 04/01/2020 100.6* 79.0 - 97.0 fL Final   • MCH 04/01/2020 33.1* 26.6 - 33.0 pg Final   • MCHC 04/01/2020 32.9  31.5 - 35.7 g/dL Final   • RDW 04/01/2020 17.3* 12.3 - 15.4 % Final   • RDW-SD 04/01/2020 64.1* 37.0 - 54.0 fl Final   • MPV 04/01/2020 9.2  6.0 - 12.0 fL Final   • Platelets 04/01/2020 276  140 - 450 10*3/mm3 Final   • Neutrophil % 04/01/2020 60.0  42.7 - 76.0 % Final   • Lymphocyte % 04/01/2020 23.0  19.6 - 45.3 % Final   • Monocyte % 04/01/2020 14.2* 5.0 - 12.0 % Final   • Eosinophil % 04/01/2020 2.1  0.3 - 6.2 % Final   • Basophil % 04/01/2020 0.4  0.0 - 1.5 % Final   • Immature Grans % 04/01/2020 0.3  0.0 - 0.5 % Final   • Neutrophils, Absolute 04/01/2020 4.58  1.70 - 7.00 10*3/mm3 Final   • Lymphocytes, Absolute 04/01/2020 1.75  0.70 - 3.10 10*3/mm3 Final   • Monocytes, Absolute 04/01/2020 1.08* 0.10 - 0.90 10*3/mm3 Final   • Eosinophils, Absolute 04/01/2020 0.16  0.00 - 0.40 10*3/mm3 Final   • Basophils, Absolute 04/01/2020 0.03  0.00 - 0.20 10*3/mm3 Final   • Immature Grans, Absolute 04/01/2020 0.02  0.00 - 0.05 10*3/mm3 Final   • nRBC 04/01/2020 0.0  0.0 - 0.2 /100 WBC Final          ASSESSMENT: The patient is a very pleasant 69 y.o.  female  with right upper lobe extensive stage small cell lung cancer     PROBLEM LIST:   1.  Right upper lobe extensive stage small cell lung cancer, [id number] stage IVb:  A.  Presented with right lower ribs pain  B.  CT chest revealed 3.5 cm right upper lobe lung mass, right adrenal nodule, mediastinal adenopathy, and left cervical lymphadenopathy.  C.  Status post left cervical lymph node biopsy done by Dr. Owen December 2, 2019  D.  Pathology consistent with small cell carcinoma  E.  Started palliative treatment with carboplatin and etoposide and Tecentriq December 15, 2019, status post 6 cycles  2.  COPD  3.  Hypertension  4.  Hypercholesterolemia  5.  Hypothyroid  6. GERD      PLAN:  1.  I will proceed with treatment as scheduled today maintenace Tecentriq cycle #7.  2.  The patient follow-up with us in 3 weeks for cycle #8.  3.  We will do 3 months follow-up study in 3 months which will be due May 2020.  4.  I will continue to monitor the patient blood work including blood counts kidney function liver function electrolytes function.  5.  I will continue the patient on Zofran as needed for chemotherapy-induced nausea.  6. We reviewed the potential side effects of this regimen including fatigue, vomiting and nausea, hair loss, nephropathy, neuropathy, hearing loss, myelosuppression, and risk of infusion reaction.  7. We reviewed potential side effects of immunotherapy including but not limited to immune mediated reactions with thyroiditis, pneumonitis, hepatitis, colitis, rash, and electrolytes abnormalities, fatigue, multiorgan failure, and possibly death.  8.  I will continue to monitor patient blood pressure will consider adjusting her antihypertensive agents if needed while she is on active cancer treatment.  9.  We will continue Synthroid daily for hypothyroid.  Patient is at risk for thyroiditis while she is on immunotherapy.  10. We will continue omeprazole for GERD.    Rupesh Cao MD  4/22/2020

## 2020-05-08 DIAGNOSIS — E78.2 MIXED HYPERLIPIDEMIA: ICD-10-CM

## 2020-05-08 RX ORDER — PRAVASTATIN SODIUM 40 MG
40 TABLET ORAL DAILY
Qty: 90 TABLET | Refills: 3 | Status: SHIPPED | OUTPATIENT
Start: 2020-05-08

## 2020-05-08 NOTE — TELEPHONE ENCOUNTER
PT IS REQUESTING A REFILL FOR     pravastatin (PRAVACHOL) 40 MG tablet    PT CALL BACK [phone]    [facility] [place]

## 2020-05-13 ENCOUNTER — INFUSION (OUTPATIENT)
Dept: ONCOLOGY | Facility: HOSPITAL | Age: 70
End: 2020-05-13

## 2020-05-13 ENCOUNTER — OFFICE VISIT (OUTPATIENT)
Dept: ONCOLOGY | Facility: CLINIC | Age: 70
End: 2020-05-13

## 2020-05-13 VITALS
WEIGHT: 135.7 LBS | HEART RATE: 100 BPM | RESPIRATION RATE: 16 BRPM | TEMPERATURE: 98.5 F | HEIGHT: 62 IN | SYSTOLIC BLOOD PRESSURE: 163 MMHG | OXYGEN SATURATION: 98 % | BODY MASS INDEX: 24.97 KG/M2 | DIASTOLIC BLOOD PRESSURE: 80 MMHG

## 2020-05-13 DIAGNOSIS — C34.90 SCLC (SMALL CELL LUNG CARCINOMA) (HCC): Primary | ICD-10-CM

## 2020-05-13 LAB
ALBUMIN SERPL-MCNC: 3.9 G/DL (ref 3.5–5.2)
ALBUMIN/GLOB SERPL: 1 G/DL
ALP SERPL-CCNC: 92 U/L (ref 39–117)
ALT SERPL W P-5'-P-CCNC: 10 U/L (ref 1–33)
ANION GAP SERPL CALCULATED.3IONS-SCNC: 11.9 MMOL/L (ref 5–15)
AST SERPL-CCNC: 14 U/L (ref 1–32)
BASOPHILS # BLD AUTO: 0.05 10*3/MM3 (ref 0–0.2)
BASOPHILS NFR BLD AUTO: 0.4 % (ref 0–1.5)
BILIRUB SERPL-MCNC: 0.2 MG/DL (ref 0.2–1.2)
BUN BLD-MCNC: 11 MG/DL (ref 8–23)
BUN/CREAT SERPL: 14.7 (ref 7–25)
CALCIUM SPEC-SCNC: 9.1 MG/DL (ref 8.6–10.5)
CHLORIDE SERPL-SCNC: 95 MMOL/L (ref 98–107)
CO2 SERPL-SCNC: 26.1 MMOL/L (ref 22–29)
CREAT BLD-MCNC: 0.75 MG/DL (ref 0.57–1)
DEPRECATED RDW RBC AUTO: 50.9 FL (ref 37–54)
EOSINOPHIL # BLD AUTO: 0.2 10*3/MM3 (ref 0–0.4)
EOSINOPHIL NFR BLD AUTO: 1.8 % (ref 0.3–6.2)
ERYTHROCYTE [DISTWIDTH] IN BLOOD BY AUTOMATED COUNT: 14.5 % (ref 12.3–15.4)
GFR SERPL CREATININE-BSD FRML MDRD: 77 ML/MIN/1.73
GLOBULIN UR ELPH-MCNC: 3.8 GM/DL
GLUCOSE BLD-MCNC: 110 MG/DL (ref 65–99)
HCT VFR BLD AUTO: 34.7 % (ref 34–46.6)
HGB BLD-MCNC: 11.2 G/DL (ref 12–15.9)
IMM GRANULOCYTES # BLD AUTO: 0.03 10*3/MM3 (ref 0–0.05)
IMM GRANULOCYTES NFR BLD AUTO: 0.3 % (ref 0–0.5)
LYMPHOCYTES # BLD AUTO: 1.83 10*3/MM3 (ref 0.7–3.1)
LYMPHOCYTES NFR BLD AUTO: 16.4 % (ref 19.6–45.3)
MCH RBC QN AUTO: 30.6 PG (ref 26.6–33)
MCHC RBC AUTO-ENTMCNC: 32.3 G/DL (ref 31.5–35.7)
MCV RBC AUTO: 94.8 FL (ref 79–97)
MONOCYTES # BLD AUTO: 1.31 10*3/MM3 (ref 0.1–0.9)
MONOCYTES NFR BLD AUTO: 11.8 % (ref 5–12)
NEUTROPHILS # BLD AUTO: 7.72 10*3/MM3 (ref 1.7–7)
NEUTROPHILS NFR BLD AUTO: 69.3 % (ref 42.7–76)
NRBC BLD AUTO-RTO: 0 /100 WBC (ref 0–0.2)
PLATELET # BLD AUTO: 255 10*3/MM3 (ref 140–450)
PMV BLD AUTO: 8.5 FL (ref 6–12)
POTASSIUM BLD-SCNC: 4.3 MMOL/L (ref 3.5–5.2)
PROT SERPL-MCNC: 7.7 G/DL (ref 6–8.5)
RBC # BLD AUTO: 3.66 10*6/MM3 (ref 3.77–5.28)
SODIUM BLD-SCNC: 133 MMOL/L (ref 136–145)
WBC NRBC COR # BLD: 11.14 10*3/MM3 (ref 3.4–10.8)

## 2020-05-13 PROCEDURE — 96367 TX/PROPH/DG ADDL SEQ IV INF: CPT

## 2020-05-13 PROCEDURE — 96413 CHEMO IV INFUSION 1 HR: CPT

## 2020-05-13 PROCEDURE — 96375 TX/PRO/DX INJ NEW DRUG ADDON: CPT

## 2020-05-13 PROCEDURE — 36415 COLL VENOUS BLD VENIPUNCTURE: CPT

## 2020-05-13 PROCEDURE — 85025 COMPLETE CBC W/AUTO DIFF WBC: CPT

## 2020-05-13 PROCEDURE — 99214 OFFICE O/P EST MOD 30 MIN: CPT | Performed by: NURSE PRACTITIONER

## 2020-05-13 PROCEDURE — 25010000003 HEPARIN LOCK FLUSH PER 10 UNITS: Performed by: INTERNAL MEDICINE

## 2020-05-13 PROCEDURE — 80053 COMPREHEN METABOLIC PANEL: CPT

## 2020-05-13 PROCEDURE — 25010000002 ONDANSETRON PER 1 MG: Performed by: INTERNAL MEDICINE

## 2020-05-13 PROCEDURE — 25010000002 ATEZOLIZUMAB 1200 MG/20ML SOLUTION 20 ML VIAL: Performed by: INTERNAL MEDICINE

## 2020-05-13 RX ORDER — SODIUM CHLORIDE 9 MG/ML
250 INJECTION, SOLUTION INTRAVENOUS ONCE
Status: CANCELLED | OUTPATIENT
Start: 2020-06-03

## 2020-05-13 RX ORDER — HEPARIN SODIUM (PORCINE) LOCK FLUSH IV SOLN 100 UNIT/ML 100 UNIT/ML
500 SOLUTION INTRAVENOUS AS NEEDED
Status: DISCONTINUED | OUTPATIENT
Start: 2020-05-13 | End: 2020-05-13 | Stop reason: HOSPADM

## 2020-05-13 RX ORDER — SODIUM CHLORIDE 0.9 % (FLUSH) 0.9 %
10 SYRINGE (ML) INJECTION AS NEEDED
Status: DISCONTINUED | OUTPATIENT
Start: 2020-05-13 | End: 2020-05-13 | Stop reason: HOSPADM

## 2020-05-13 RX ORDER — SODIUM CHLORIDE 9 MG/ML
250 INJECTION, SOLUTION INTRAVENOUS ONCE
Status: DISCONTINUED | OUTPATIENT
Start: 2020-05-13 | End: 2020-05-13 | Stop reason: HOSPADM

## 2020-05-13 RX ORDER — HEPARIN SODIUM (PORCINE) LOCK FLUSH IV SOLN 100 UNIT/ML 100 UNIT/ML
500 SOLUTION INTRAVENOUS AS NEEDED
Status: CANCELLED | OUTPATIENT
Start: 2020-05-13

## 2020-05-13 RX ORDER — SODIUM CHLORIDE 0.9 % (FLUSH) 0.9 %
10 SYRINGE (ML) INJECTION AS NEEDED
Status: CANCELLED | OUTPATIENT
Start: 2020-05-13

## 2020-05-13 RX ADMIN — ONDANSETRON 16 MG: 2 INJECTION INTRAMUSCULAR; INTRAVENOUS at 09:49

## 2020-05-13 RX ADMIN — ATEZOLIZUMAB 1200 MG: 1200 INJECTION, SOLUTION INTRAVENOUS at 10:06

## 2020-05-13 RX ADMIN — SODIUM CHLORIDE, PRESERVATIVE FREE 10 ML: 5 INJECTION INTRAVENOUS at 10:39

## 2020-05-13 RX ADMIN — HEPARIN 500 UNITS: 100 SYRINGE at 10:39

## 2020-06-01 ENCOUNTER — HOSPITAL ENCOUNTER (OUTPATIENT)
Dept: CT IMAGING | Facility: HOSPITAL | Age: 70
Discharge: HOME | End: 2020-06-01

## 2020-06-01 DIAGNOSIS — C34.90 SCLC (SMALL CELL LUNG CARCINOMA) (HCC): ICD-10-CM

## 2020-06-01 PROCEDURE — 25010000002 IOPAMIDOL 61 % SOLUTION: Performed by: NURSE PRACTITIONER

## 2020-06-01 PROCEDURE — 74177 CT ABD & PELVIS W/CONTRAST: CPT

## 2020-06-01 PROCEDURE — 71260 CT THORAX DX C+: CPT

## 2020-06-01 PROCEDURE — 70491 CT SOFT TISSUE NECK W/DYE: CPT

## 2020-06-01 RX ADMIN — IOPAMIDOL 100 ML: 612 INJECTION, SOLUTION INTRAVENOUS at 07:07

## 2020-06-03 ENCOUNTER — OFFICE VISIT (OUTPATIENT)
Dept: ONCOLOGY | Facility: CLINIC | Age: 70
End: 2020-06-03

## 2020-06-03 VITALS
WEIGHT: 134 LBS | SYSTOLIC BLOOD PRESSURE: 173 MMHG | HEART RATE: 87 BPM | BODY MASS INDEX: 24.66 KG/M2 | HEIGHT: 62 IN | OXYGEN SATURATION: 99 % | TEMPERATURE: 98.6 F | DIASTOLIC BLOOD PRESSURE: 82 MMHG | RESPIRATION RATE: 12 BRPM

## 2020-06-03 DIAGNOSIS — C34.90 SCLC (SMALL CELL LUNG CARCINOMA) (HCC): Primary | ICD-10-CM

## 2020-06-03 PROCEDURE — 99215 OFFICE O/P EST HI 40 MIN: CPT | Performed by: INTERNAL MEDICINE

## 2020-06-03 NOTE — PROGRESS NOTES
DATE OF VISIT: 6/3/2020    REASON FOR VISIT: Followup for extensive stage small cell lung cancer     HISTORY OF PRESENT ILLNESS: The patient is a very pleasant 69 y.o. female with past medical history significant for extensive stage small cell lung cancer diagnosed December 2, 2019.  She was started on palliative treatment with carboplatin and etoposide and Tecentriq December 15, 2019. Tecentriq maintenance started 03/11/2020. The patient is here today for follow-up visit with treatment cycle #8.    SUBJECTIVE: The patient is here today by herself.  She is complaining of pain in her neck secondary to enlarging lymph nodes.  She denied fever chills night sweats.  She is anxious about the scan results.    PAST MEDICAL HISTORY/SOCIAL HISTORY/FAMILY HISTORY: Reviewed by me and unchanged from my documentation done on 06/03/20.    Review of Systems   Constitutional: Negative for activity change, appetite change, chills, fatigue, fever and unexpected weight change.   HENT: Negative for hearing loss, mouth sores, nosebleeds, sore throat and trouble swallowing.    Eyes: Negative for visual disturbance.   Respiratory: Negative for cough, chest tightness, shortness of breath and wheezing.    Cardiovascular: Negative for chest pain, palpitations and leg swelling.   Gastrointestinal: Negative for abdominal distention, abdominal pain, blood in stool, constipation, diarrhea, nausea, rectal pain and vomiting.   Endocrine: Negative for cold intolerance and heat intolerance.   Genitourinary: Negative for difficulty urinating, dysuria, frequency and urgency.   Musculoskeletal: Negative for arthralgias, back pain, gait problem, joint swelling and myalgias.   Skin: Negative for rash.   Neurological: Negative for dizziness, tremors, syncope, weakness, light-headedness, numbness and headaches.   Hematological: Negative for adenopathy. Does not bruise/bleed easily.   Psychiatric/Behavioral: Negative for confusion, sleep disturbance and  suicidal ideas. The patient is not nervous/anxious.          Current Outpatient Medications:   •  acetaminophen (TYLENOL) 500 MG tablet, Take 500 mg by mouth Every 6 (Six) Hours As Needed for Mild Pain ., Disp: , Rfl:   •  amLODIPine (NORVASC) 5 MG tablet, Take 5 mg by mouth Daily., Disp: , Rfl:   •  baclofen (LIORESAL) 10 MG tablet, 1 TABLET BY MOUTH THREE TIMES A DAY START WITH ONE PILL NIGHTLY FOR A WEEK, THEN INCREASE TO TWICE A DAY FOR A WEEK, THEN THREE TIMES A DAY, Disp: 270 tablet, Rfl: 1  •  Calcium Carbonate-Vitamin D (CALCIUM-CARB 600 + D) 600-125 MG-UNIT tablet, Take 500 mg by mouth 2 (Two) Times a Day., Disp: 60 each, Rfl: 5  •  diclofenac (VOLTAREN) 75 MG EC tablet, Take 75 mg by mouth Daily As Needed., Disp: , Rfl:   •  gabapentin (NEURONTIN) 300 MG capsule, Take 1 capsule by mouth Daily With Breakfast & Lunch AND 2 capsules Every Night., Disp: 360 capsule, Rfl: 1  •  HYDROcodone-acetaminophen (NORCO) 7.5-325 MG per tablet, Take 1 tablet by mouth Every 4 (Four) Hours As Needed for Moderate Pain, Disp: 10 tablet, Rfl: 0  •  levothyroxine (SYNTHROID, LEVOTHROID) 125 MCG tablet, Take 1 tablet by mouth Daily., Disp: 90 tablet, Rfl: 3  •  lidocaine (LIDODERM) 5 %, Place 1 patch on the skin as directed by provider Daily. Remove & Discard patch within 12 hours or as directed by MD, Disp: 90 each, Rfl: 3  •  Lidocaine Viscous HCl (XYLOCAINE) 2 % solution, , Disp: , Rfl:   •  lidocaine-prilocaine (EMLA) 2.5-2.5 % cream, Apply  topically to the appropriate area as directed As Needed (45-60 minutes prior to port access.  Cover with saran/plastic wrap.)., Disp: 30 g, Rfl: 3  •  magic mouthwash oral suspension, Swish and spit (or swallow) 1-2 Teaspoonfuls (5-10ml) 4 times a day as needed, Disp: 240 mL, Rfl: 3  •  omeprazole (priLOSEC) 40 MG capsule, Take 1 capsule by mouth Daily., Disp: 90 capsule, Rfl: 2  •  pravastatin (PRAVACHOL) 40 MG tablet, Take 1 tablet by mouth Daily., Disp: 90 tablet, Rfl: 3  •   "tiotropium bromide-olodaterol (STIOLTO RESPIMAT) 2.5-2.5 MCG/ACT aerosol solution inhaler, Inhale 2 puffs Daily., Disp: 1 inhaler, Rfl: 5  •  traMADol (ULTRAM) 50 MG tablet, Take 1 tablet by mouth 3 (Three) Times a Day., Disp: 90 tablet, Rfl: 1    PHYSICAL EXAMINATION:   /82   Pulse 87   Temp 98.6 °F (37 °C) (Oral)   Resp 12   Ht 157.5 cm (62.01\")   Wt 60.8 kg (134 lb)   SpO2 99%   BMI 24.50 kg/m²    ECOG Performance Status: 1 - Symptomatic but completely ambulatory  General Appearance:  alert, cooperative, no apparent distress, appears stated age and normal weight   Neurologic/Psychiatric: A&O x 3, gait steady, appropriate affect, strength 5/5 in all muscle groups   HEENT:  Normocephalic, without obvious abnormality, mucous membranes moist   Neck: Supple, symmetrical, trachea midline, no adenopathy;  No thyromegaly, masses, or tenderness   Lungs:   Clear to auscultation bilaterally; respirations regular, even, and unlabored bilaterally   Heart:  Regular rate and rhythm, no murmurs appreciated   Abdomen:   Soft, non-tender, non-distended and no organomegaly   Lymph nodes: No cervical, supraclavicular, inguinal or axillary adenopathy noted.  Submandibular nodule noted approximately 2cm   Extremities: Normal, atraumatic; no clubbing, cyanosis, or edema    Skin: No rashes, ulcers, or suspicious lesions noted     No visits with results within 2 Week(s) from this visit.   Latest known visit with results is:   Infusion on 05/13/2020   Component Date Value Ref Range Status   • Glucose 05/13/2020 110* 65 - 99 mg/dL Final   • BUN 05/13/2020 11  8 - 23 mg/dL Final   • Creatinine 05/13/2020 0.75  0.57 - 1.00 mg/dL Final   • Sodium 05/13/2020 133* 136 - 145 mmol/L Final   • Potassium 05/13/2020 4.3  3.5 - 5.2 mmol/L Final   • Chloride 05/13/2020 95* 98 - 107 mmol/L Final   • CO2 05/13/2020 26.1  22.0 - 29.0 mmol/L Final   • Calcium 05/13/2020 9.1  8.6 - 10.5 mg/dL Final   • Total Protein 05/13/2020 7.7  6.0 - 8.5 " g/dL Final   • Albumin 05/13/2020 3.90  3.50 - 5.20 g/dL Final   • ALT (SGPT) 05/13/2020 10  1 - 33 U/L Final   • AST (SGOT) 05/13/2020 14  1 - 32 U/L Final   • Alkaline Phosphatase 05/13/2020 92  39 - 117 U/L Final   • Total Bilirubin 05/13/2020 0.2  0.2 - 1.2 mg/dL Final   • eGFR Non African Amer 05/13/2020 77  >60 mL/min/1.73 Final   • Globulin 05/13/2020 3.8  gm/dL Final   • A/G Ratio 05/13/2020 1.0  g/dL Final   • BUN/Creatinine Ratio 05/13/2020 14.7  7.0 - 25.0 Final   • Anion Gap 05/13/2020 11.9  5.0 - 15.0 mmol/L Final   • WBC 05/13/2020 11.14* 3.40 - 10.80 10*3/mm3 Final   • RBC 05/13/2020 3.66* 3.77 - 5.28 10*6/mm3 Final   • Hemoglobin 05/13/2020 11.2* 12.0 - 15.9 g/dL Final   • Hematocrit 05/13/2020 34.7  34.0 - 46.6 % Final   • MCV 05/13/2020 94.8  79.0 - 97.0 fL Final   • MCH 05/13/2020 30.6  26.6 - 33.0 pg Final   • MCHC 05/13/2020 32.3  31.5 - 35.7 g/dL Final   • RDW 05/13/2020 14.5  12.3 - 15.4 % Final   • RDW-SD 05/13/2020 50.9  37.0 - 54.0 fl Final   • MPV 05/13/2020 8.5  6.0 - 12.0 fL Final   • Platelets 05/13/2020 255  140 - 450 10*3/mm3 Final   • Neutrophil % 05/13/2020 69.3  42.7 - 76.0 % Final   • Lymphocyte % 05/13/2020 16.4* 19.6 - 45.3 % Final   • Monocyte % 05/13/2020 11.8  5.0 - 12.0 % Final   • Eosinophil % 05/13/2020 1.8  0.3 - 6.2 % Final   • Basophil % 05/13/2020 0.4  0.0 - 1.5 % Final   • Immature Grans % 05/13/2020 0.3  0.0 - 0.5 % Final   • Neutrophils, Absolute 05/13/2020 7.72* 1.70 - 7.00 10*3/mm3 Final   • Lymphocytes, Absolute 05/13/2020 1.83  0.70 - 3.10 10*3/mm3 Final   • Monocytes, Absolute 05/13/2020 1.31* 0.10 - 0.90 10*3/mm3 Final   • Eosinophils, Absolute 05/13/2020 0.20  0.00 - 0.40 10*3/mm3 Final   • Basophils, Absolute 05/13/2020 0.05  0.00 - 0.20 10*3/mm3 Final   • Immature Grans, Absolute 05/13/2020 0.03  0.00 - 0.05 10*3/mm3 Final   • nRBC 05/13/2020 0.0  0.0 - 0.2 /100 WBC Final      Ct Soft Tissue Neck With Contrast    Result Date: 6/1/2020  Narrative: CT SOFT  TISSUE NECK  INDICATION: Small cell lung cancer. New nodule or stone in the right neck.  FINDINGS: Axial imaging through the neck with intravenous contrast. This study was performed with techniques to keep radiation dose as low as reasonably achievable, (ALARA).  Individualized dose reduction techniques using automated exposure control or adjustment of mA and/or kV according to the patient size were employed. No previous.  The thyroid is not well seen and may be extensively small in size. Vocal cords are symmetric. Narrowing of the airway above the vocal cords may be related to underdistention. The oropharynx is also under distended limiting assessment. Partial visualization of a left-sided internal jugular venous Port-A-Cath. There are several enlarged lymph nodes in the upper right anterior cervical chain. One example is a right level 2 lymph node on series 3 image 59 measuring 2 x 1.7 cm. Another focus more superiorly and deep to the sternocleidomastoid and just lateral to the internal jugular vein on series 3 image 51 measures 1.8 x 1.4 cm. This may be partially necrotic. There are some other scattered normal size lymph nodes. An asymmetric masslike area near the inferior margin of the right submandibular gland could represent an area of adjacent adenopathy but other neoplastic process is not excluded. This measures 3.1 x 2.4 cm. Prominent degenerative changes in the spine. Advanced emphysematous changes in the visualized lung apices.      Impression: Multifocal areas of adenopathy and/or soft tissue mass in the anterior right neck as detailed above. Findings are concerning for malignancy/metastatic disease.  This report was finalized on 6/1/2020 11:59 AM by Rommel Arambula MD.    Ct Chest With Contrast    Result Date: 6/1/2020  Narrative: CT CHEST, ABDOMEN AND PELVIS  INDICATION: Follow-up right upper lobe lung cancer.  FINDINGS: Axial imaging through the chest, abdomen and pelvis with intravenous contrast. This  study was performed with techniques to keep radiation dose as low as reasonably achievable, (ALARA).  Individualized dose reduction techniques using automated exposure control or adjustment of mA and/or kV according to the patient size were employed.  COMPARISON: 02/17/2020.  CT CHEST: Please see separate dictation for CT of the neck describing some areas of adenopathy/mass, also included in the field-of-view on this study. Left internal jugular venous Port-A-Cath. Heart size is normal. No pleural or pericardial effusion. Scattered vascular calcifications. Previously described precarinal lymph node currently measures 1.1 cm, previously 1.7 cm. An AP window lymph node measures 1.1 cm, previously 1.9 cm. There are some other mediastinal lymph nodes which are visually similar to previous. Scattered calcifications, likely due to old granulomatous disease. A spiculated opacity in the medial aspect of the right upper lobe measures 1.3 x 1.1 cm, previously 1.5 x 1.2 cm. Advanced emphysematous changes. A somewhat ill-defined opacity in the periphery of the right upper lobe is unchanged. Curvilinear density in the posterior right lower lobe is unchanged. Tiny ill-defined somewhat nodular density in the posterior aspect of the left upper lobe on series 4 image 36 measures approximately 4 mm, unchanged from prior. Presumed scarring in the lingula is unchanged. Degenerative changes are scattered in the spine.      Impression: CT CHEST IMPRESSION: 1. A right upper lobe spiculated nodule is slightly smaller. 2. Areas of adenopathy are also predominantly smaller. 3. Other findings in the chest are unchanged. Please see separate report for CT of the neck.  CT ABDOMEN PELVIS: Gallbladder is unremarkable. Liver is homogeneous. Spleen is normal size. Pancreas is unremarkable. New or significantly enlarged right adrenal mass measures 4.7 x 3 cm. No obvious left adrenal mass. No hydronephrosis, cyst or solid renal mass identified.  Scattered vascular calcifications. Mild distal abdominal aortic ectasia. No bowel obstruction. Incidental notation of circumaortic left renal vein. Scattered normal size lymph nodes without adenopathy by size criteria. The urinary bladder is unremarkable. Uterus is likely atrophic. Osteopenia. Degenerative changes  CT ABDOMEN AND PELVIS IMPRESSION: 1. Right adrenal mass concerning for malignancy/metastatic disease. 2. Other chronic appearing findings.  This report was finalized on 6/1/2020 12:17 PM by Rommel Arambula MD.    Ct Abdomen Pelvis With Contrast    Result Date: 6/1/2020  Narrative: CT CHEST, ABDOMEN AND PELVIS  INDICATION: Follow-up right upper lobe lung cancer.  FINDINGS: Axial imaging through the chest, abdomen and pelvis with intravenous contrast. This study was performed with techniques to keep radiation dose as low as reasonably achievable, (ALARA).  Individualized dose reduction techniques using automated exposure control or adjustment of mA and/or kV according to the patient size were employed.  COMPARISON: 02/17/2020.  CT CHEST: Please see separate dictation for CT of the neck describing some areas of adenopathy/mass, also included in the field-of-view on this study. Left internal jugular venous Port-A-Cath. Heart size is normal. No pleural or pericardial effusion. Scattered vascular calcifications. Previously described precarinal lymph node currently measures 1.1 cm, previously 1.7 cm. An AP window lymph node measures 1.1 cm, previously 1.9 cm. There are some other mediastinal lymph nodes which are visually similar to previous. Scattered calcifications, likely due to old granulomatous disease. A spiculated opacity in the medial aspect of the right upper lobe measures 1.3 x 1.1 cm, previously 1.5 x 1.2 cm. Advanced emphysematous changes. A somewhat ill-defined opacity in the periphery of the right upper lobe is unchanged. Curvilinear density in the posterior right lower lobe is unchanged. Tiny  ill-defined somewhat nodular density in the posterior aspect of the left upper lobe on series 4 image 36 measures approximately 4 mm, unchanged from prior. Presumed scarring in the lingula is unchanged. Degenerative changes are scattered in the spine.      Impression: CT CHEST IMPRESSION: 1. A right upper lobe spiculated nodule is slightly smaller. 2. Areas of adenopathy are also predominantly smaller. 3. Other findings in the chest are unchanged. Please see separate report for CT of the neck.  CT ABDOMEN PELVIS: Gallbladder is unremarkable. Liver is homogeneous. Spleen is normal size. Pancreas is unremarkable. New or significantly enlarged right adrenal mass measures 4.7 x 3 cm. No obvious left adrenal mass. No hydronephrosis, cyst or solid renal mass identified. Scattered vascular calcifications. Mild distal abdominal aortic ectasia. No bowel obstruction. Incidental notation of circumaortic left renal vein. Scattered normal size lymph nodes without adenopathy by size criteria. The urinary bladder is unremarkable. Uterus is likely atrophic. Osteopenia. Degenerative changes  CT ABDOMEN AND PELVIS IMPRESSION: 1. Right adrenal mass concerning for malignancy/metastatic disease. 2. Other chronic appearing findings.  This report was finalized on 6/1/2020 12:17 PM by Rommel Arambula MD.  (    ASSESSMENT: The patient is a very pleasant 69 y.o. female  with right upper lobe extensive stage small cell lung cancer     PROBLEM LIST:   1.  Right upper lobe extensive stage small cell lung cancer, [id number] stage IVb:  A.  Presented with right lower ribs pain  B.  CT chest revealed 3.5 cm right upper lobe lung mass, right adrenal nodule, mediastinal adenopathy, and left cervical lymphadenopathy.  C.  Status post left cervical lymph node biopsy done by Dr. Owen December 2, 2019  D.  Pathology consistent with small cell carcinoma  E.  Started palliative treatment with carboplatin and etoposide and Tecentriq December 15, 2019, status  post 8 cycles  F.  Progressive disease documented CT scans done on June 1, 2020 with a new cervical lymphadenopathy and relapsed right adrenal mass  G.  We will start Opdivo plus Yervoy Brenda 10, 2020  2.  COPD  3.  Hypertension  4.  Hypercholesterolemia  5.  Hypothyroid  6.  GERD      PLAN:  1.  I did go over the scan results with the patient I reviewed the films myself and went over the pictures with her.  Unfortunately she has evidence of progressive disease.  2.  The patient is interested in active cancer treatment should be switched to Opdivo plus Yervoy, this is in compliance with NCCN guidelines.  3.  I will repeat the patient's scans after cycle #4 if she is having good response to treatment will consider Opdivo maintenance.  4.  I will continue to monitor the patient blood work including blood counts kidney function liver function electrolytes function.  5.  I will continue the patient on Zofran as needed for chemotherapy-induced nausea.  6. We reviewed the potential side effects of this regimen including fatigue, vomiting and nausea, hair loss, nephropathy, neuropathy, hearing loss, myelosuppression, and risk of infusion reaction.  7. We reviewed potential side effects of immunotherapy including but not limited to immune mediated reactions with thyroiditis, pneumonitis, hepatitis, colitis, rash, and electrolytes abnormalities, fatigue, multiorgan failure, and possibly death.  8.  I will continue to monitor patient blood pressure will consider adjusting her antihypertensive agents if needed while she is on active cancer treatment.  9.  We will continue Synthroid daily for hypothyroid.  Patient is at risk for thyroiditis while she is on immunotherapy.  10. We will continue omeprazole for GERD.    Rupesh Cao MD  6/3/2020

## 2020-06-11 ENCOUNTER — OFFICE VISIT (OUTPATIENT)
Dept: ONCOLOGY | Facility: CLINIC | Age: 70
End: 2020-06-11

## 2020-06-11 ENCOUNTER — INFUSION (OUTPATIENT)
Dept: ONCOLOGY | Facility: HOSPITAL | Age: 70
End: 2020-06-11

## 2020-06-11 VITALS
DIASTOLIC BLOOD PRESSURE: 74 MMHG | OXYGEN SATURATION: 97 % | HEART RATE: 94 BPM | WEIGHT: 134 LBS | RESPIRATION RATE: 12 BRPM | TEMPERATURE: 98.2 F | SYSTOLIC BLOOD PRESSURE: 166 MMHG | HEIGHT: 62 IN | BODY MASS INDEX: 24.66 KG/M2

## 2020-06-11 DIAGNOSIS — C34.90 SCLC (SMALL CELL LUNG CARCINOMA) (HCC): ICD-10-CM

## 2020-06-11 DIAGNOSIS — C34.90 SCLC (SMALL CELL LUNG CARCINOMA) (HCC): Primary | ICD-10-CM

## 2020-06-11 LAB
ALBUMIN SERPL-MCNC: 3.7 G/DL (ref 3.5–5.2)
ALBUMIN/GLOB SERPL: 0.9 G/DL
ALP SERPL-CCNC: 150 U/L (ref 39–117)
ALT SERPL W P-5'-P-CCNC: 19 U/L (ref 1–33)
ANION GAP SERPL CALCULATED.3IONS-SCNC: 8.8 MMOL/L (ref 5–15)
AST SERPL-CCNC: 17 U/L (ref 1–32)
BASOPHILS # BLD AUTO: 0.03 10*3/MM3 (ref 0–0.2)
BASOPHILS NFR BLD AUTO: 0.3 % (ref 0–1.5)
BILIRUB SERPL-MCNC: 0.2 MG/DL (ref 0.2–1.2)
BUN BLD-MCNC: 8 MG/DL (ref 8–23)
BUN/CREAT SERPL: 11.6 (ref 7–25)
CALCIUM SPEC-SCNC: 8.9 MG/DL (ref 8.6–10.5)
CHLORIDE SERPL-SCNC: 92 MMOL/L (ref 98–107)
CO2 SERPL-SCNC: 25.2 MMOL/L (ref 22–29)
CREAT BLD-MCNC: 0.69 MG/DL (ref 0.57–1)
DEPRECATED RDW RBC AUTO: 47.1 FL (ref 37–54)
EOSINOPHIL # BLD AUTO: 0.03 10*3/MM3 (ref 0–0.4)
EOSINOPHIL NFR BLD AUTO: 0.3 % (ref 0.3–6.2)
ERYTHROCYTE [DISTWIDTH] IN BLOOD BY AUTOMATED COUNT: 14.6 % (ref 12.3–15.4)
GFR SERPL CREATININE-BSD FRML MDRD: 85 ML/MIN/1.73
GLOBULIN UR ELPH-MCNC: 4 GM/DL
GLUCOSE BLD-MCNC: 113 MG/DL (ref 65–99)
HCT VFR BLD AUTO: 32.7 % (ref 34–46.6)
HGB BLD-MCNC: 10.6 G/DL (ref 12–15.9)
IMM GRANULOCYTES # BLD AUTO: 0.08 10*3/MM3 (ref 0–0.05)
IMM GRANULOCYTES NFR BLD AUTO: 0.8 % (ref 0–0.5)
LYMPHOCYTES # BLD AUTO: 1.11 10*3/MM3 (ref 0.7–3.1)
LYMPHOCYTES NFR BLD AUTO: 11.2 % (ref 19.6–45.3)
MCH RBC QN AUTO: 28.3 PG (ref 26.6–33)
MCHC RBC AUTO-ENTMCNC: 32.4 G/DL (ref 31.5–35.7)
MCV RBC AUTO: 87.4 FL (ref 79–97)
MONOCYTES # BLD AUTO: 1.28 10*3/MM3 (ref 0.1–0.9)
MONOCYTES NFR BLD AUTO: 13 % (ref 5–12)
NEUTROPHILS # BLD AUTO: 7.35 10*3/MM3 (ref 1.7–7)
NEUTROPHILS NFR BLD AUTO: 74.4 % (ref 42.7–76)
NRBC BLD AUTO-RTO: 0 /100 WBC (ref 0–0.2)
PLATELET # BLD AUTO: 292 10*3/MM3 (ref 140–450)
PMV BLD AUTO: 8.8 FL (ref 6–12)
POTASSIUM BLD-SCNC: 4.6 MMOL/L (ref 3.5–5.2)
PROT SERPL-MCNC: 7.7 G/DL (ref 6–8.5)
RBC # BLD AUTO: 3.74 10*6/MM3 (ref 3.77–5.28)
SODIUM BLD-SCNC: 126 MMOL/L (ref 136–145)
T4 FREE SERPL-MCNC: 1.76 NG/DL (ref 0.93–1.7)
TSH SERPL DL<=0.05 MIU/L-ACNC: 0.42 UIU/ML (ref 0.27–4.2)
WBC NRBC COR # BLD: 9.88 10*3/MM3 (ref 3.4–10.8)

## 2020-06-11 PROCEDURE — 25010000003 HEPARIN LOCK FLUSH PER 10 UNITS: Performed by: INTERNAL MEDICINE

## 2020-06-11 PROCEDURE — 84443 ASSAY THYROID STIM HORMONE: CPT

## 2020-06-11 PROCEDURE — 80053 COMPREHEN METABOLIC PANEL: CPT

## 2020-06-11 PROCEDURE — 85025 COMPLETE CBC W/AUTO DIFF WBC: CPT

## 2020-06-11 PROCEDURE — 82024 ASSAY OF ACTH: CPT

## 2020-06-11 PROCEDURE — 84439 ASSAY OF FREE THYROXINE: CPT

## 2020-06-11 PROCEDURE — 25010000002 NIVOLUMAB 40 MG/4ML SOLUTION 4 ML VIAL: Performed by: NURSE PRACTITIONER

## 2020-06-11 PROCEDURE — 36415 COLL VENOUS BLD VENIPUNCTURE: CPT

## 2020-06-11 PROCEDURE — 99214 OFFICE O/P EST MOD 30 MIN: CPT | Performed by: NURSE PRACTITIONER

## 2020-06-11 PROCEDURE — 96413 CHEMO IV INFUSION 1 HR: CPT

## 2020-06-11 PROCEDURE — 25010000002 IPILIMUMAB 50 MG/10ML SOLUTION 10 ML VIAL: Performed by: NURSE PRACTITIONER

## 2020-06-11 PROCEDURE — 96417 CHEMO IV INFUS EACH ADDL SEQ: CPT

## 2020-06-11 PROCEDURE — 25010000002 NIVOLUMAB 100 MG/10ML SOLUTION 10 ML VIAL: Performed by: NURSE PRACTITIONER

## 2020-06-11 RX ORDER — SODIUM CHLORIDE 0.9 % (FLUSH) 0.9 %
10 SYRINGE (ML) INJECTION AS NEEDED
Status: DISCONTINUED | OUTPATIENT
Start: 2020-06-11 | End: 2020-06-11 | Stop reason: HOSPADM

## 2020-06-11 RX ORDER — ONDANSETRON HYDROCHLORIDE 8 MG/1
8 TABLET, FILM COATED ORAL 3 TIMES DAILY PRN
Qty: 30 TABLET | Refills: 5 | Status: SHIPPED | OUTPATIENT
Start: 2020-06-11 | End: 2020-06-30 | Stop reason: DRUGHIGH

## 2020-06-11 RX ORDER — SODIUM CHLORIDE 9 MG/ML
250 INJECTION, SOLUTION INTRAVENOUS ONCE
Status: CANCELLED | OUTPATIENT
Start: 2020-06-11

## 2020-06-11 RX ORDER — HEPARIN SODIUM (PORCINE) LOCK FLUSH IV SOLN 100 UNIT/ML 100 UNIT/ML
500 SOLUTION INTRAVENOUS AS NEEDED
Status: DISCONTINUED | OUTPATIENT
Start: 2020-06-11 | End: 2020-06-11 | Stop reason: HOSPADM

## 2020-06-11 RX ORDER — SODIUM CHLORIDE 0.9 % (FLUSH) 0.9 %
10 SYRINGE (ML) INJECTION AS NEEDED
Status: CANCELLED | OUTPATIENT
Start: 2020-06-11

## 2020-06-11 RX ORDER — SODIUM CHLORIDE 9 MG/ML
250 INJECTION, SOLUTION INTRAVENOUS ONCE
Status: DISCONTINUED | OUTPATIENT
Start: 2020-06-11 | End: 2020-06-11 | Stop reason: HOSPADM

## 2020-06-11 RX ORDER — HEPARIN SODIUM (PORCINE) LOCK FLUSH IV SOLN 100 UNIT/ML 100 UNIT/ML
500 SOLUTION INTRAVENOUS AS NEEDED
Status: CANCELLED | OUTPATIENT
Start: 2020-06-11

## 2020-06-11 RX ADMIN — SODIUM CHLORIDE 60 MG: 9 INJECTION, SOLUTION INTRAVENOUS at 12:34

## 2020-06-11 RX ADMIN — HEPARIN 500 UNITS: 100 SYRINGE at 13:15

## 2020-06-11 RX ADMIN — SODIUM CHLORIDE, PRESERVATIVE FREE 10 ML: 5 INJECTION INTRAVENOUS at 13:15

## 2020-06-11 RX ADMIN — SODIUM CHLORIDE 180 MG: 9 INJECTION, SOLUTION INTRAVENOUS at 12:00

## 2020-06-11 NOTE — PROGRESS NOTES
DATE OF VISIT: 6/11/2020    REASON FOR VISIT: Followup for extensive stage small cell lung cancer     HISTORY OF PRESENT ILLNESS: The patient is a very pleasant 69 y.o. female with past medical history significant for extensive stage small cell lung cancer diagnosed December 2, 2019.  She was started on palliative treatment with carboplatin and etoposide and Tecentriq December 15, 2019. Tecentriq maintenance started 03/11/2020. The patient is here today for follow-up visit with treatment cycle #1 opdivo and yervoy.    SUBJECTIVE: The patient is here today by herself.  She has  pain in her neck secondary to enlarging lymph nodes.  She denied fever chills night sweats.  She is anxious about starting new treatment and side effects.         PAST MEDICAL HISTORY/SOCIAL HISTORY/FAMILY HISTORY: Reviewed by me and unchanged from my documentation done on 06/11/20.    Review of Systems   Constitutional: Negative for activity change, appetite change, chills, fatigue, fever and unexpected weight change.   HENT: Negative for hearing loss, mouth sores, nosebleeds, sore throat and trouble swallowing.    Eyes: Negative for visual disturbance.   Respiratory: Negative for cough, chest tightness, shortness of breath and wheezing.    Cardiovascular: Negative for chest pain, palpitations and leg swelling.   Gastrointestinal: Negative for abdominal distention, abdominal pain, blood in stool, constipation, diarrhea, nausea, rectal pain and vomiting.   Endocrine: Negative for cold intolerance and heat intolerance.   Genitourinary: Negative for difficulty urinating, dysuria, frequency and urgency.   Musculoskeletal: Negative for arthralgias, back pain, gait problem, joint swelling and myalgias.   Skin: Negative for rash.   Neurological: Negative for dizziness, tremors, syncope, weakness, light-headedness, numbness and headaches.   Hematological: Negative for adenopathy. Does not bruise/bleed easily.   Psychiatric/Behavioral: Negative for  confusion, sleep disturbance and suicidal ideas. The patient is not nervous/anxious.          Current Outpatient Medications:   •  acetaminophen (TYLENOL) 500 MG tablet, Take 500 mg by mouth Every 6 (Six) Hours As Needed for Mild Pain ., Disp: , Rfl:   •  amLODIPine (NORVASC) 5 MG tablet, Take 5 mg by mouth Daily., Disp: , Rfl:   •  baclofen (LIORESAL) 10 MG tablet, 1 TABLET BY MOUTH THREE TIMES A DAY START WITH ONE PILL NIGHTLY FOR A WEEK, THEN INCREASE TO TWICE A DAY FOR A WEEK, THEN THREE TIMES A DAY, Disp: 270 tablet, Rfl: 1  •  Calcium Carbonate-Vitamin D (CALCIUM-CARB 600 + D) 600-125 MG-UNIT tablet, Take 500 mg by mouth 2 (Two) Times a Day., Disp: 60 each, Rfl: 5  •  diclofenac (VOLTAREN) 75 MG EC tablet, Take 75 mg by mouth Daily As Needed., Disp: , Rfl:   •  gabapentin (NEURONTIN) 300 MG capsule, Take 1 capsule by mouth Daily With Breakfast & Lunch AND 2 capsules Every Night., Disp: 360 capsule, Rfl: 1  •  levothyroxine (SYNTHROID, LEVOTHROID) 125 MCG tablet, Take 1 tablet by mouth Daily., Disp: 90 tablet, Rfl: 3  •  lidocaine (LIDODERM) 5 %, Place 1 patch on the skin as directed by provider Daily. Remove & Discard patch within 12 hours or as directed by MD, Disp: 90 each, Rfl: 3  •  Lidocaine Viscous HCl (XYLOCAINE) 2 % solution, , Disp: , Rfl:   •  lidocaine-prilocaine (EMLA) 2.5-2.5 % cream, Apply  topically to the appropriate area as directed As Needed (45-60 minutes prior to port access.  Cover with saran/plastic wrap.)., Disp: 30 g, Rfl: 3  •  magic mouthwash oral suspension, Swish and spit (or swallow) 1-2 Teaspoonfuls (5-10ml) 4 times a day as needed, Disp: 240 mL, Rfl: 3  •  omeprazole (priLOSEC) 40 MG capsule, Take 1 capsule by mouth Daily., Disp: 90 capsule, Rfl: 2  •  pravastatin (PRAVACHOL) 40 MG tablet, Take 1 tablet by mouth Daily., Disp: 90 tablet, Rfl: 3  •  tiotropium bromide-olodaterol (STIOLTO RESPIMAT) 2.5-2.5 MCG/ACT aerosol solution inhaler, Inhale 2 puffs Daily., Disp: 1 inhaler,  "Rfl: 5  •  traMADol (ULTRAM) 50 MG tablet, Take 1 tablet by mouth 3 (Three) Times a Day., Disp: 90 tablet, Rfl: 1    PHYSICAL EXAMINATION:   /74   Pulse 94   Temp 98.2 °F (36.8 °C) (Temporal)   Resp 12   Ht 157.5 cm (62.01\")   Wt 60.8 kg (134 lb)   SpO2 97%   BMI 24.50 kg/m²    ECOG Performance Status: 1 - Symptomatic but completely ambulatory  General Appearance:  alert, cooperative, no apparent distress, appears stated age and normal weight   Neurologic/Psychiatric: A&O x 3, gait steady, appropriate affect, strength 5/5 in all muscle groups   HEENT:  Normocephalic, without obvious abnormality, mucous membranes moist   Neck: Supple, symmetrical, trachea midline, no adenopathy;  No thyromegaly, masses, or tenderness   Lungs:   Clear to auscultation bilaterally; respirations regular, even, and unlabored bilaterally   Heart:  Regular rate and rhythm, no murmurs appreciated   Abdomen:   Soft, non-tender, non-distended and no organomegaly   Lymph nodes: No cervical, supraclavicular, inguinal or axillary adenopathy noted.  Submandibular nodule noted approximately 2cm   Extremities: Normal, atraumatic; no clubbing, cyanosis, or edema    Skin: No rashes, ulcers, or suspicious lesions noted     No visits with results within 2 Week(s) from this visit.   Latest known visit with results is:   Infusion on 05/13/2020   Component Date Value Ref Range Status   • Glucose 05/13/2020 110* 65 - 99 mg/dL Final   • BUN 05/13/2020 11  8 - 23 mg/dL Final   • Creatinine 05/13/2020 0.75  0.57 - 1.00 mg/dL Final   • Sodium 05/13/2020 133* 136 - 145 mmol/L Final   • Potassium 05/13/2020 4.3  3.5 - 5.2 mmol/L Final   • Chloride 05/13/2020 95* 98 - 107 mmol/L Final   • CO2 05/13/2020 26.1  22.0 - 29.0 mmol/L Final   • Calcium 05/13/2020 9.1  8.6 - 10.5 mg/dL Final   • Total Protein 05/13/2020 7.7  6.0 - 8.5 g/dL Final   • Albumin 05/13/2020 3.90  3.50 - 5.20 g/dL Final   • ALT (SGPT) 05/13/2020 10  1 - 33 U/L Final   • AST (SGOT) " 05/13/2020 14  1 - 32 U/L Final   • Alkaline Phosphatase 05/13/2020 92  39 - 117 U/L Final   • Total Bilirubin 05/13/2020 0.2  0.2 - 1.2 mg/dL Final   • eGFR Non African Amer 05/13/2020 77  >60 mL/min/1.73 Final   • Globulin 05/13/2020 3.8  gm/dL Final   • A/G Ratio 05/13/2020 1.0  g/dL Final   • BUN/Creatinine Ratio 05/13/2020 14.7  7.0 - 25.0 Final   • Anion Gap 05/13/2020 11.9  5.0 - 15.0 mmol/L Final   • WBC 05/13/2020 11.14* 3.40 - 10.80 10*3/mm3 Final   • RBC 05/13/2020 3.66* 3.77 - 5.28 10*6/mm3 Final   • Hemoglobin 05/13/2020 11.2* 12.0 - 15.9 g/dL Final   • Hematocrit 05/13/2020 34.7  34.0 - 46.6 % Final   • MCV 05/13/2020 94.8  79.0 - 97.0 fL Final   • MCH 05/13/2020 30.6  26.6 - 33.0 pg Final   • MCHC 05/13/2020 32.3  31.5 - 35.7 g/dL Final   • RDW 05/13/2020 14.5  12.3 - 15.4 % Final   • RDW-SD 05/13/2020 50.9  37.0 - 54.0 fl Final   • MPV 05/13/2020 8.5  6.0 - 12.0 fL Final   • Platelets 05/13/2020 255  140 - 450 10*3/mm3 Final   • Neutrophil % 05/13/2020 69.3  42.7 - 76.0 % Final   • Lymphocyte % 05/13/2020 16.4* 19.6 - 45.3 % Final   • Monocyte % 05/13/2020 11.8  5.0 - 12.0 % Final   • Eosinophil % 05/13/2020 1.8  0.3 - 6.2 % Final   • Basophil % 05/13/2020 0.4  0.0 - 1.5 % Final   • Immature Grans % 05/13/2020 0.3  0.0 - 0.5 % Final   • Neutrophils, Absolute 05/13/2020 7.72* 1.70 - 7.00 10*3/mm3 Final   • Lymphocytes, Absolute 05/13/2020 1.83  0.70 - 3.10 10*3/mm3 Final   • Monocytes, Absolute 05/13/2020 1.31* 0.10 - 0.90 10*3/mm3 Final   • Eosinophils, Absolute 05/13/2020 0.20  0.00 - 0.40 10*3/mm3 Final   • Basophils, Absolute 05/13/2020 0.05  0.00 - 0.20 10*3/mm3 Final   • Immature Grans, Absolute 05/13/2020 0.03  0.00 - 0.05 10*3/mm3 Final   • nRBC 05/13/2020 0.0  0.0 - 0.2 /100 WBC Final      Ct Soft Tissue Neck With Contrast    Result Date: 6/1/2020  Narrative: CT SOFT TISSUE NECK  INDICATION: Small cell lung cancer. New nodule or stone in the right neck.  FINDINGS: Axial imaging through the  neck with intravenous contrast. This study was performed with techniques to keep radiation dose as low as reasonably achievable, (ALARA).  Individualized dose reduction techniques using automated exposure control or adjustment of mA and/or kV according to the patient size were employed. No previous.  The thyroid is not well seen and may be extensively small in size. Vocal cords are symmetric. Narrowing of the airway above the vocal cords may be related to underdistention. The oropharynx is also under distended limiting assessment. Partial visualization of a left-sided internal jugular venous Port-A-Cath. There are several enlarged lymph nodes in the upper right anterior cervical chain. One example is a right level 2 lymph node on series 3 image 59 measuring 2 x 1.7 cm. Another focus more superiorly and deep to the sternocleidomastoid and just lateral to the internal jugular vein on series 3 image 51 measures 1.8 x 1.4 cm. This may be partially necrotic. There are some other scattered normal size lymph nodes. An asymmetric masslike area near the inferior margin of the right submandibular gland could represent an area of adjacent adenopathy but other neoplastic process is not excluded. This measures 3.1 x 2.4 cm. Prominent degenerative changes in the spine. Advanced emphysematous changes in the visualized lung apices.      Impression: Multifocal areas of adenopathy and/or soft tissue mass in the anterior right neck as detailed above. Findings are concerning for malignancy/metastatic disease.  This report was finalized on 6/1/2020 11:59 AM by Rommel Arambula MD.    Ct Chest With Contrast    Result Date: 6/1/2020  Narrative: CT CHEST, ABDOMEN AND PELVIS  INDICATION: Follow-up right upper lobe lung cancer.  FINDINGS: Axial imaging through the chest, abdomen and pelvis with intravenous contrast. This study was performed with techniques to keep radiation dose as low as reasonably achievable, (ALARA).  Individualized dose  reduction techniques using automated exposure control or adjustment of mA and/or kV according to the patient size were employed.  COMPARISON: 02/17/2020.  CT CHEST: Please see separate dictation for CT of the neck describing some areas of adenopathy/mass, also included in the field-of-view on this study. Left internal jugular venous Port-A-Cath. Heart size is normal. No pleural or pericardial effusion. Scattered vascular calcifications. Previously described precarinal lymph node currently measures 1.1 cm, previously 1.7 cm. An AP window lymph node measures 1.1 cm, previously 1.9 cm. There are some other mediastinal lymph nodes which are visually similar to previous. Scattered calcifications, likely due to old granulomatous disease. A spiculated opacity in the medial aspect of the right upper lobe measures 1.3 x 1.1 cm, previously 1.5 x 1.2 cm. Advanced emphysematous changes. A somewhat ill-defined opacity in the periphery of the right upper lobe is unchanged. Curvilinear density in the posterior right lower lobe is unchanged. Tiny ill-defined somewhat nodular density in the posterior aspect of the left upper lobe on series 4 image 36 measures approximately 4 mm, unchanged from prior. Presumed scarring in the lingula is unchanged. Degenerative changes are scattered in the spine.      Impression: CT CHEST IMPRESSION: 1. A right upper lobe spiculated nodule is slightly smaller. 2. Areas of adenopathy are also predominantly smaller. 3. Other findings in the chest are unchanged. Please see separate report for CT of the neck.  CT ABDOMEN PELVIS: Gallbladder is unremarkable. Liver is homogeneous. Spleen is normal size. Pancreas is unremarkable. New or significantly enlarged right adrenal mass measures 4.7 x 3 cm. No obvious left adrenal mass. No hydronephrosis, cyst or solid renal mass identified. Scattered vascular calcifications. Mild distal abdominal aortic ectasia. No bowel obstruction. Incidental notation of  circumaortic left renal vein. Scattered normal size lymph nodes without adenopathy by size criteria. The urinary bladder is unremarkable. Uterus is likely atrophic. Osteopenia. Degenerative changes  CT ABDOMEN AND PELVIS IMPRESSION: 1. Right adrenal mass concerning for malignancy/metastatic disease. 2. Other chronic appearing findings.  This report was finalized on 6/1/2020 12:17 PM by Rommel Arambula MD.    Ct Abdomen Pelvis With Contrast    Result Date: 6/1/2020  Narrative: CT CHEST, ABDOMEN AND PELVIS  INDICATION: Follow-up right upper lobe lung cancer.  FINDINGS: Axial imaging through the chest, abdomen and pelvis with intravenous contrast. This study was performed with techniques to keep radiation dose as low as reasonably achievable, (ALARA).  Individualized dose reduction techniques using automated exposure control or adjustment of mA and/or kV according to the patient size were employed.  COMPARISON: 02/17/2020.  CT CHEST: Please see separate dictation for CT of the neck describing some areas of adenopathy/mass, also included in the field-of-view on this study. Left internal jugular venous Port-A-Cath. Heart size is normal. No pleural or pericardial effusion. Scattered vascular calcifications. Previously described precarinal lymph node currently measures 1.1 cm, previously 1.7 cm. An AP window lymph node measures 1.1 cm, previously 1.9 cm. There are some other mediastinal lymph nodes which are visually similar to previous. Scattered calcifications, likely due to old granulomatous disease. A spiculated opacity in the medial aspect of the right upper lobe measures 1.3 x 1.1 cm, previously 1.5 x 1.2 cm. Advanced emphysematous changes. A somewhat ill-defined opacity in the periphery of the right upper lobe is unchanged. Curvilinear density in the posterior right lower lobe is unchanged. Tiny ill-defined somewhat nodular density in the posterior aspect of the left upper lobe on series 4 image 36 measures  approximately 4 mm, unchanged from prior. Presumed scarring in the lingula is unchanged. Degenerative changes are scattered in the spine.      Impression: CT CHEST IMPRESSION: 1. A right upper lobe spiculated nodule is slightly smaller. 2. Areas of adenopathy are also predominantly smaller. 3. Other findings in the chest are unchanged. Please see separate report for CT of the neck.  CT ABDOMEN PELVIS: Gallbladder is unremarkable. Liver is homogeneous. Spleen is normal size. Pancreas is unremarkable. New or significantly enlarged right adrenal mass measures 4.7 x 3 cm. No obvious left adrenal mass. No hydronephrosis, cyst or solid renal mass identified. Scattered vascular calcifications. Mild distal abdominal aortic ectasia. No bowel obstruction. Incidental notation of circumaortic left renal vein. Scattered normal size lymph nodes without adenopathy by size criteria. The urinary bladder is unremarkable. Uterus is likely atrophic. Osteopenia. Degenerative changes  CT ABDOMEN AND PELVIS IMPRESSION: 1. Right adrenal mass concerning for malignancy/metastatic disease. 2. Other chronic appearing findings.  This report was finalized on 6/1/2020 12:17 PM by Rommel Arambula MD.  (  CHEMOTHERAPY EDUCATION                                                                                                                                                                  Chemotherapy Regimen:   Treatment Plans     Name Type Plan dates Plan Provider         Active    ipilimumab/nivolumab ONCOLOGY TREATMENT  Present                     TOPICS EDUCATION PROVIDED COMMENTS   ANEMIA:  role of RBC, cause, s/s, ways to manage, role of transfusion [x]    THROMBOCYTOPENIA:  role of platelet, cause, s/s, ways to prevent bleeding, things to avoid, when to seek help [x]    NEUTROPENIA:  role of WBC, cause, infection precautions, s/s of infection, when to call MD [x]    NUTRITION & APPETITE CHANGES:  importance of maintaining healthy diet & weight,  ways to manage to improve intake, dietary consult, exercise regimen [x]    DIARRHEA:  causes, s/s of dehydration, ways to manage, dietary changes, when to call MD [x]    CONSTIPATION:  causes, ways to manage, dietary changes, when to call MD [x]    NAUSEA & VOMITING:  cause, use of antiemetics, dietary changes, when to call MD [x]    MOUTH SORES:  causes, oral care, ways to manage [x]    ALOPECIA:  cause, ways to manage, resources [x]    INFERTILITY & SEXUALITY:  causes, fertility preservation options, sexuality changes, ways to manage, importance of birth control [x]    NERVOUS SYSTEM CHANGES:  causes, s/s, neuropathies, cognitive changes, ways to manage [x]    PAIN:  causes, ways to manage [x] ????   SKIN & NAIL CHANGES:  cause, s/s, ways to manage [x]    ORGAN TOXICITIES:  cause, s/s, need for diagnostic tests, labs, when to notify MD [x]    SURVIVORSHIP:  distress, distress assessment, secondary malignancies, early/late effects, follow-up, social issues, social support [x]    HOME CARE:  use of spill kits, storing of PO chemo, how to manage bodily fluids [x]    MISCELLANEOUS:  drug interactions, administration, vesicant, et [x]          ASSESSMENT: The patient is a very pleasant 69 y.o. female  with right upper lobe extensive stage small cell lung cancer     PROBLEM LIST:   1.  Right upper lobe extensive stage small cell lung cancer, [id number] stage IVb:  A.  Presented with right lower ribs pain  B.  CT chest revealed 3.5 cm right upper lobe lung mass, right adrenal nodule, mediastinal adenopathy, and left cervical lymphadenopathy.  C.  Status post left cervical lymph node biopsy done by Dr. Owen December 2, 2019  D.  Pathology consistent with small cell carcinoma  E.  Started palliative treatment with carboplatin and etoposide and Tecentriq December 15, 2019, status post 8 cycles  F.  Progressive disease documented CT scans done on June 1, 2020 with a new cervical lymphadenopathy and relapsed right adrenal  mass  G.  We will start Opdivo plus Yervoy Brenda 10, 2020  2.  COPD  3.  Hypertension  4.  Hypercholesterolemia  5.  Hypothyroid  6.  GERD      PLAN:  1. We will proceed with Opdivo plus Yervoy, this is in compliance with NCCN guidelines.  2. The patient will follow up with us in 3 weeks for cycle #2.  3.  I will repeat the patient's scans after cycle #4 if she is having good response to treatment will consider Opdivo maintenance.  4.  I will continue to monitor the patient blood work including blood counts kidney function liver function electrolytes function.  5.  I will continue the patient on Zofran as needed for chemotherapy-induced nausea.  6.  We reviewed potential side effects of immunotherapy including but not limited to immune mediated reactions with thyroiditis, pneumonitis, hepatitis, colitis, rash, and electrolytes abnormalities, fatigue, multiorgan failure, and possibly death.  7.  I will continue to monitor patient blood pressure will consider adjusting her antihypertensive agents if needed while she is on active cancer treatment.  8.  We will continue Synthroid daily for hypothyroid.  Patient is at risk for thyroiditis while she is on immunotherapy.  9. We will continue omeprazole for GERD.   10. This was a 30 minute face-to-face visit with 25 minutes spent in  counseling and coordination of care as documented above. The patient and I have reviewed their metastatic cancer diagnosis and scheduled treatment plan.   11. Chemotherapy teaching was also completed today as documented above. Adequate time was given to answer all questions to her satisfaction. Patient is aware of their care team members and contact information if they have questions or problems throughout the treatment course.     Kathe Higuera, APRN  6/11/2020

## 2020-06-12 LAB — ACTH PLAS-MCNC: 6.6 PG/ML (ref 7.2–63.3)

## 2020-06-17 DIAGNOSIS — G89.29 CHRONIC JOINT PAIN: ICD-10-CM

## 2020-06-17 DIAGNOSIS — M25.50 CHRONIC JOINT PAIN: ICD-10-CM

## 2020-06-17 DIAGNOSIS — C34.90 SCLC (SMALL CELL LUNG CARCINOMA) (HCC): Primary | ICD-10-CM

## 2020-06-17 RX ORDER — TRAMADOL HYDROCHLORIDE 50 MG/1
TABLET ORAL
Qty: 270 TABLET | Refills: 1 | Status: SHIPPED | OUTPATIENT
Start: 2020-06-17 | End: 2020-12-02 | Stop reason: SDUPTHER

## 2020-06-17 NOTE — TELEPHONE ENCOUNTER
Refill request received. SUKHI reviewed. Last office visit was 12/27/19 but pt has cancer and is undergoing treatment for such. Will send refill w/o recent follow up due to this. Due for visit after 12/28.

## 2020-06-24 ENCOUNTER — TELEPHONE (OUTPATIENT)
Dept: ONCOLOGY | Facility: CLINIC | Age: 70
End: 2020-06-24

## 2020-06-24 RX ORDER — PROMETHAZINE HYDROCHLORIDE 25 MG/1
25 TABLET ORAL EVERY 6 HOURS PRN
Qty: 45 TABLET | Refills: 5 | Status: SHIPPED | OUTPATIENT
Start: 2020-06-24 | End: 2020-11-03

## 2020-06-24 NOTE — TELEPHONE ENCOUNTER
She states that the zofran helps, but the nausea returns sooner than every 8 hours as zofran is prescribed. Pt advised that phenergan will be sent in to her pharmacy to help with her nausea in between zofran use. To return call if this regimen is ineffective in managing the n/v

## 2020-06-24 NOTE — TELEPHONE ENCOUNTER
Patient called and her stomach starting bothering her really late Friday and then she started throwing up until Saturday. Saturday during the day she started feeling better and had some chicken noodle soup, crackers and a glass of milk. She was fine Sunday, but on Monday her stomach starting hurting again then she threw up until Tuesday morning. Once everything came up and then she would fill better. She wants to know if this is normal due to the chemo. Please call.

## 2020-06-30 ENCOUNTER — TELEPHONE (OUTPATIENT)
Dept: ONCOLOGY | Facility: CLINIC | Age: 70
End: 2020-06-30

## 2020-06-30 RX ORDER — ONDANSETRON 8 MG/1
8 TABLET, ORALLY DISINTEGRATING ORAL EVERY 8 HOURS PRN
Qty: 30 TABLET | Refills: 2 | Status: SHIPPED | OUTPATIENT
Start: 2020-06-30 | End: 2020-09-24

## 2020-06-30 NOTE — TELEPHONE ENCOUNTER
Patient called triage line stating she has been vomiting and yesterday was the worse. Stating she had medication called in but it's not working.    Called patient back to assess how she is taking her medication. Patient stating she takes it after she gets sick. Patient stating that what she eats in am will come up later in the day when she eats dinner. Discussing with patient how to take medication and alternating phenergan and zofran.     Discussed with Dr. Cao and called patient leaving message on vm that Dr. Cao sent her zofran ODT to take for nausea to see if using the ODT will help.

## 2020-07-01 ENCOUNTER — INFUSION (OUTPATIENT)
Dept: ONCOLOGY | Facility: HOSPITAL | Age: 70
End: 2020-07-01

## 2020-07-01 ENCOUNTER — OFFICE VISIT (OUTPATIENT)
Dept: ONCOLOGY | Facility: CLINIC | Age: 70
End: 2020-07-01

## 2020-07-01 VITALS
RESPIRATION RATE: 16 BRPM | HEIGHT: 62 IN | BODY MASS INDEX: 23.19 KG/M2 | TEMPERATURE: 97.8 F | WEIGHT: 126 LBS | HEART RATE: 111 BPM | OXYGEN SATURATION: 97 % | SYSTOLIC BLOOD PRESSURE: 137 MMHG | DIASTOLIC BLOOD PRESSURE: 88 MMHG

## 2020-07-01 DIAGNOSIS — C34.90 SCLC (SMALL CELL LUNG CARCINOMA) (HCC): Primary | ICD-10-CM

## 2020-07-01 LAB
ALBUMIN SERPL-MCNC: 4.1 G/DL (ref 3.5–5.2)
ALBUMIN/GLOB SERPL: 1 G/DL
ALP SERPL-CCNC: 91 U/L (ref 39–117)
ALT SERPL W P-5'-P-CCNC: 10 U/L (ref 1–33)
ANION GAP SERPL CALCULATED.3IONS-SCNC: 12.3 MMOL/L (ref 5–15)
AST SERPL-CCNC: 13 U/L (ref 1–32)
BASOPHILS # BLD AUTO: 0.05 10*3/MM3 (ref 0–0.2)
BASOPHILS NFR BLD AUTO: 0.4 % (ref 0–1.5)
BILIRUB SERPL-MCNC: 0.3 MG/DL (ref 0.2–1.2)
BUN SERPL-MCNC: 16 MG/DL (ref 8–23)
BUN/CREAT SERPL: 12.9 (ref 7–25)
CALCIUM SPEC-SCNC: 9.3 MG/DL (ref 8.6–10.5)
CHLORIDE SERPL-SCNC: 89 MMOL/L (ref 98–107)
CO2 SERPL-SCNC: 21.7 MMOL/L (ref 22–29)
CREAT SERPL-MCNC: 1.24 MG/DL (ref 0.57–1)
DEPRECATED RDW RBC AUTO: 47.2 FL (ref 37–54)
EOSINOPHIL # BLD AUTO: 0.02 10*3/MM3 (ref 0–0.4)
EOSINOPHIL NFR BLD AUTO: 0.1 % (ref 0.3–6.2)
ERYTHROCYTE [DISTWIDTH] IN BLOOD BY AUTOMATED COUNT: 15.5 % (ref 12.3–15.4)
GFR SERPL CREATININE-BSD FRML MDRD: 43 ML/MIN/1.73
GLOBULIN UR ELPH-MCNC: 4 GM/DL
GLUCOSE SERPL-MCNC: 120 MG/DL (ref 65–99)
HCT VFR BLD AUTO: 36.4 % (ref 34–46.6)
HGB BLD-MCNC: 11.9 G/DL (ref 12–15.9)
IMM GRANULOCYTES # BLD AUTO: 0.1 10*3/MM3 (ref 0–0.05)
IMM GRANULOCYTES NFR BLD AUTO: 0.7 % (ref 0–0.5)
LYMPHOCYTES # BLD AUTO: 1.93 10*3/MM3 (ref 0.7–3.1)
LYMPHOCYTES NFR BLD AUTO: 14 % (ref 19.6–45.3)
MCH RBC QN AUTO: 27.7 PG (ref 26.6–33)
MCHC RBC AUTO-ENTMCNC: 32.7 G/DL (ref 31.5–35.7)
MCV RBC AUTO: 84.8 FL (ref 79–97)
MONOCYTES # BLD AUTO: 1.49 10*3/MM3 (ref 0.1–0.9)
MONOCYTES NFR BLD AUTO: 10.8 % (ref 5–12)
NEUTROPHILS NFR BLD AUTO: 10.19 10*3/MM3 (ref 1.7–7)
NEUTROPHILS NFR BLD AUTO: 74 % (ref 42.7–76)
NRBC BLD AUTO-RTO: 0 /100 WBC (ref 0–0.2)
PLATELET # BLD AUTO: 341 10*3/MM3 (ref 140–450)
PMV BLD AUTO: 8.4 FL (ref 6–12)
POTASSIUM SERPL-SCNC: 4.7 MMOL/L (ref 3.5–5.2)
PROT SERPL-MCNC: 8.1 G/DL (ref 6–8.5)
RBC # BLD AUTO: 4.29 10*6/MM3 (ref 3.77–5.28)
SODIUM SERPL-SCNC: 123 MMOL/L (ref 136–145)
T4 FREE SERPL-MCNC: 1.83 NG/DL (ref 0.93–1.7)
TSH SERPL DL<=0.05 MIU/L-ACNC: 0.86 UIU/ML (ref 0.27–4.2)
WBC # BLD AUTO: 13.78 10*3/MM3 (ref 3.4–10.8)

## 2020-07-01 PROCEDURE — 25010000002 IPILIMUMAB 50 MG/10ML SOLUTION 10 ML VIAL: Performed by: INTERNAL MEDICINE

## 2020-07-01 PROCEDURE — 96413 CHEMO IV INFUSION 1 HR: CPT

## 2020-07-01 PROCEDURE — 25010000002 NIVOLUMAB 100 MG/10ML SOLUTION 10 ML VIAL: Performed by: INTERNAL MEDICINE

## 2020-07-01 PROCEDURE — 84439 ASSAY OF FREE THYROXINE: CPT

## 2020-07-01 PROCEDURE — 85025 COMPLETE CBC W/AUTO DIFF WBC: CPT

## 2020-07-01 PROCEDURE — 99215 OFFICE O/P EST HI 40 MIN: CPT | Performed by: INTERNAL MEDICINE

## 2020-07-01 PROCEDURE — 80053 COMPREHEN METABOLIC PANEL: CPT

## 2020-07-01 PROCEDURE — 96360 HYDRATION IV INFUSION INIT: CPT

## 2020-07-01 PROCEDURE — 84443 ASSAY THYROID STIM HORMONE: CPT

## 2020-07-01 PROCEDURE — 25010000003 HEPARIN LOCK FLUSH PER 10 UNITS: Performed by: INTERNAL MEDICINE

## 2020-07-01 PROCEDURE — 36415 COLL VENOUS BLD VENIPUNCTURE: CPT

## 2020-07-01 PROCEDURE — 96417 CHEMO IV INFUS EACH ADDL SEQ: CPT

## 2020-07-01 PROCEDURE — 96361 HYDRATE IV INFUSION ADD-ON: CPT

## 2020-07-01 PROCEDURE — 82024 ASSAY OF ACTH: CPT

## 2020-07-01 PROCEDURE — 25010000002 NIVOLUMAB 40 MG/4ML SOLUTION 4 ML VIAL: Performed by: INTERNAL MEDICINE

## 2020-07-01 RX ORDER — SODIUM CHLORIDE 9 MG/ML
250 INJECTION, SOLUTION INTRAVENOUS ONCE
Status: DISCONTINUED | OUTPATIENT
Start: 2020-07-01 | End: 2020-07-01 | Stop reason: HOSPADM

## 2020-07-01 RX ORDER — OLANZAPINE 5 MG/1
5 TABLET ORAL NIGHTLY
Qty: 30 TABLET | Refills: 5 | Status: SHIPPED | OUTPATIENT
Start: 2020-07-01 | End: 2020-07-31

## 2020-07-01 RX ORDER — SODIUM CHLORIDE 0.9 % (FLUSH) 0.9 %
10 SYRINGE (ML) INJECTION AS NEEDED
Status: DISCONTINUED | OUTPATIENT
Start: 2020-07-01 | End: 2020-07-01 | Stop reason: HOSPADM

## 2020-07-01 RX ORDER — HEPARIN SODIUM (PORCINE) LOCK FLUSH IV SOLN 100 UNIT/ML 100 UNIT/ML
500 SOLUTION INTRAVENOUS AS NEEDED
Status: CANCELLED | OUTPATIENT
Start: 2020-07-01

## 2020-07-01 RX ORDER — HEPARIN SODIUM (PORCINE) LOCK FLUSH IV SOLN 100 UNIT/ML 100 UNIT/ML
500 SOLUTION INTRAVENOUS AS NEEDED
Status: DISCONTINUED | OUTPATIENT
Start: 2020-07-01 | End: 2020-07-01 | Stop reason: HOSPADM

## 2020-07-01 RX ORDER — SODIUM CHLORIDE 9 MG/ML
250 INJECTION, SOLUTION INTRAVENOUS ONCE
Status: CANCELLED | OUTPATIENT
Start: 2020-07-01

## 2020-07-01 RX ORDER — SODIUM CHLORIDE 0.9 % (FLUSH) 0.9 %
10 SYRINGE (ML) INJECTION AS NEEDED
Status: CANCELLED | OUTPATIENT
Start: 2020-07-01

## 2020-07-01 RX ORDER — SODIUM CHLORIDE 9 MG/ML
250 INJECTION, SOLUTION INTRAVENOUS ONCE
Status: CANCELLED | OUTPATIENT
Start: 2020-07-22

## 2020-07-01 RX ADMIN — SODIUM CHLORIDE 60 MG: 9 INJECTION, SOLUTION INTRAVENOUS at 12:49

## 2020-07-01 RX ADMIN — SODIUM CHLORIDE 180 MG: 9 INJECTION, SOLUTION INTRAVENOUS at 12:13

## 2020-07-01 RX ADMIN — SODIUM CHLORIDE 1000 ML: 9 INJECTION, SOLUTION INTRAVENOUS at 11:38

## 2020-07-01 RX ADMIN — HEPARIN 500 UNITS: 100 SYRINGE at 13:40

## 2020-07-01 NOTE — PROGRESS NOTES
DATE OF VISIT: 7/1/2020    REASON FOR VISIT: Followup for extensive stage small cell lung cancer     HISTORY OF PRESENT ILLNESS: The patient is a very pleasant 69 y.o. female with past medical history significant for extensive stage small cell lung cancer diagnosed December 2, 2019.  She was started on palliative treatment with carboplatin and etoposide and Tecentriq December 15, 2019. Tecentriq maintenance started 03/11/2020. The patient is here today for follow-up visit with treatment cycle #2 opdivo and yervoy.    SUBJECTIVE: The patient is here today by herself.  She has been having persistent nausea.  Zofran Phenergan helping modestly.  Is been having on and off headaches.  She has not been able to maintain her hydration.      PAST MEDICAL HISTORY/SOCIAL HISTORY/FAMILY HISTORY: Reviewed by me and unchanged from my documentation done on 07/01/20.    Review of Systems   Constitutional: Negative for activity change, appetite change, chills, fatigue, fever and unexpected weight change.   HENT: Negative for hearing loss, mouth sores, nosebleeds, sore throat and trouble swallowing.    Eyes: Negative for visual disturbance.   Respiratory: Negative for cough, chest tightness, shortness of breath and wheezing.    Cardiovascular: Negative for chest pain, palpitations and leg swelling.   Gastrointestinal: Negative for abdominal distention, abdominal pain, blood in stool, constipation, diarrhea, nausea, rectal pain and vomiting.   Endocrine: Negative for cold intolerance and heat intolerance.   Genitourinary: Negative for difficulty urinating, dysuria, frequency and urgency.   Musculoskeletal: Negative for arthralgias, back pain, gait problem, joint swelling and myalgias.   Skin: Negative for rash.   Neurological: Negative for dizziness, tremors, syncope, weakness, light-headedness, numbness and headaches.   Hematological: Negative for adenopathy. Does not bruise/bleed easily.   Psychiatric/Behavioral: Negative for  confusion, sleep disturbance and suicidal ideas. The patient is not nervous/anxious.          Current Outpatient Medications:   •  acetaminophen (TYLENOL) 500 MG tablet, Take 500 mg by mouth Every 6 (Six) Hours As Needed for Mild Pain ., Disp: , Rfl:   •  amLODIPine (NORVASC) 5 MG tablet, Take 5 mg by mouth Daily., Disp: , Rfl:   •  baclofen (LIORESAL) 10 MG tablet, 1 TABLET BY MOUTH THREE TIMES A DAY START WITH ONE PILL NIGHTLY FOR A WEEK, THEN INCREASE TO TWICE A DAY FOR A WEEK, THEN THREE TIMES A DAY, Disp: 270 tablet, Rfl: 1  •  Calcium Carbonate-Vitamin D (CALCIUM-CARB 600 + D) 600-125 MG-UNIT tablet, Take 500 mg by mouth 2 (Two) Times a Day., Disp: 60 each, Rfl: 5  •  diclofenac (VOLTAREN) 75 MG EC tablet, Take 75 mg by mouth Daily As Needed., Disp: , Rfl:   •  gabapentin (NEURONTIN) 300 MG capsule, Take 1 capsule by mouth Daily With Breakfast & Lunch AND 2 capsules Every Night., Disp: 360 capsule, Rfl: 1  •  levothyroxine (SYNTHROID, LEVOTHROID) 125 MCG tablet, Take 1 tablet by mouth Daily., Disp: 90 tablet, Rfl: 3  •  lidocaine (LIDODERM) 5 %, Place 1 patch on the skin as directed by provider Daily. Remove & Discard patch within 12 hours or as directed by MD, Disp: 90 each, Rfl: 3  •  Lidocaine Viscous HCl (XYLOCAINE) 2 % solution, , Disp: , Rfl:   •  lidocaine-prilocaine (EMLA) 2.5-2.5 % cream, Apply  topically to the appropriate area as directed As Needed (45-60 minutes prior to port access.  Cover with saran/plastic wrap.)., Disp: 30 g, Rfl: 3  •  magic mouthwash oral suspension, Swish and spit (or swallow) 1-2 Teaspoonfuls (5-10ml) 4 times a day as needed, Disp: 240 mL, Rfl: 3  •  omeprazole (priLOSEC) 40 MG capsule, Take 1 capsule by mouth Daily., Disp: 90 capsule, Rfl: 2  •  ondansetron ODT (ZOFRAN-ODT) 8 MG disintegrating tablet, Place 1 tablet on the tongue Every 8 (Eight) Hours As Needed for Nausea or Vomiting., Disp: 30 tablet, Rfl: 2  •  pravastatin (PRAVACHOL) 40 MG tablet, Take 1 tablet by mouth  "Daily., Disp: 90 tablet, Rfl: 3  •  promethazine (PHENERGAN) 25 MG tablet, Take 1 tablet by mouth Every 6 (Six) Hours As Needed for Nausea or Vomiting for up to 60 doses., Disp: 45 tablet, Rfl: 5  •  tiotropium bromide-olodaterol (STIOLTO RESPIMAT) 2.5-2.5 MCG/ACT aerosol solution inhaler, Inhale 2 puffs Daily., Disp: 1 inhaler, Rfl: 5  •  traMADol (ULTRAM) 50 MG tablet, TAKE 1 TABLET BY MOUTH THREE TIMES A DAY, Disp: 270 tablet, Rfl: 1    PHYSICAL EXAMINATION:   /88   Pulse 111   Temp 97.8 °F (36.6 °C) (Temporal)   Resp 16   Ht 157.5 cm (62.01\")   Wt 57.2 kg (126 lb)   SpO2 97%   BMI 23.04 kg/m²    ECOG Performance Status: 1 - Symptomatic but completely ambulatory  General Appearance:  alert, cooperative, no apparent distress, appears stated age and normal weight   Neurologic/Psychiatric: A&O x 3, gait steady, appropriate affect, strength 5/5 in all muscle groups   HEENT:  Normocephalic, without obvious abnormality, mucous membranes moist   Neck: Supple, symmetrical, trachea midline, no adenopathy;  No thyromegaly, masses, or tenderness   Lungs:   Clear to auscultation bilaterally; respirations regular, even, and unlabored bilaterally   Heart:  Regular rate and rhythm, no murmurs appreciated   Abdomen:   Soft, non-tender, non-distended and no organomegaly   Lymph nodes: No cervical, supraclavicular, inguinal or axillary adenopathy noted.  Submandibular nodule noted approximately 2cm   Extremities: Normal, atraumatic; no clubbing, cyanosis, or edema    Skin: No rashes, ulcers, or suspicious lesions noted     No visits with results within 2 Week(s) from this visit.   Latest known visit with results is:   Infusion on 06/11/2020   Component Date Value Ref Range Status   • Glucose 06/11/2020 113* 65 - 99 mg/dL Final   • BUN 06/11/2020 8  8 - 23 mg/dL Final   • Creatinine 06/11/2020 0.69  0.57 - 1.00 mg/dL Final   • Sodium 06/11/2020 126* 136 - 145 mmol/L Final   • Potassium 06/11/2020 4.6  3.5 - 5.2 mmol/L " Final   • Chloride 06/11/2020 92* 98 - 107 mmol/L Final   • CO2 06/11/2020 25.2  22.0 - 29.0 mmol/L Final   • Calcium 06/11/2020 8.9  8.6 - 10.5 mg/dL Final   • Total Protein 06/11/2020 7.7  6.0 - 8.5 g/dL Final   • Albumin 06/11/2020 3.70  3.50 - 5.20 g/dL Final   • ALT (SGPT) 06/11/2020 19  1 - 33 U/L Final   • AST (SGOT) 06/11/2020 17  1 - 32 U/L Final   • Alkaline Phosphatase 06/11/2020 150* 39 - 117 U/L Final   • Total Bilirubin 06/11/2020 0.2  0.2 - 1.2 mg/dL Final   • eGFR Non African Amer 06/11/2020 85  >60 mL/min/1.73 Final   • Globulin 06/11/2020 4.0  gm/dL Final   • A/G Ratio 06/11/2020 0.9  g/dL Final   • BUN/Creatinine Ratio 06/11/2020 11.6  7.0 - 25.0 Final   • Anion Gap 06/11/2020 8.8  5.0 - 15.0 mmol/L Final   • WBC 06/11/2020 9.88  3.40 - 10.80 10*3/mm3 Final   • RBC 06/11/2020 3.74* 3.77 - 5.28 10*6/mm3 Final   • Hemoglobin 06/11/2020 10.6* 12.0 - 15.9 g/dL Final   • Hematocrit 06/11/2020 32.7* 34.0 - 46.6 % Final   • MCV 06/11/2020 87.4  79.0 - 97.0 fL Final   • MCH 06/11/2020 28.3  26.6 - 33.0 pg Final   • MCHC 06/11/2020 32.4  31.5 - 35.7 g/dL Final   • RDW 06/11/2020 14.6  12.3 - 15.4 % Final   • RDW-SD 06/11/2020 47.1  37.0 - 54.0 fl Final   • MPV 06/11/2020 8.8  6.0 - 12.0 fL Final   • Platelets 06/11/2020 292  140 - 450 10*3/mm3 Final   • Neutrophil % 06/11/2020 74.4  42.7 - 76.0 % Final   • Lymphocyte % 06/11/2020 11.2* 19.6 - 45.3 % Final   • Monocyte % 06/11/2020 13.0* 5.0 - 12.0 % Final   • Eosinophil % 06/11/2020 0.3  0.3 - 6.2 % Final   • Basophil % 06/11/2020 0.3  0.0 - 1.5 % Final   • Immature Grans % 06/11/2020 0.8* 0.0 - 0.5 % Final   • Neutrophils, Absolute 06/11/2020 7.35* 1.70 - 7.00 10*3/mm3 Final   • Lymphocytes, Absolute 06/11/2020 1.11  0.70 - 3.10 10*3/mm3 Final   • Monocytes, Absolute 06/11/2020 1.28* 0.10 - 0.90 10*3/mm3 Final   • Eosinophils, Absolute 06/11/2020 0.03  0.00 - 0.40 10*3/mm3 Final   • Basophils, Absolute 06/11/2020 0.03  0.00 - 0.20 10*3/mm3 Final   •  Immature Grans, Absolute 06/11/2020 0.08* 0.00 - 0.05 10*3/mm3 Final   • nRBC 06/11/2020 0.0  0.0 - 0.2 /100 WBC Final   • ACTH 06/11/2020 6.6* 7.2 - 63.3 pg/mL Final    ACTH reference interval for samples collected between 7 and 10 AM.   • Free T4 06/11/2020 1.76* 0.93 - 1.70 ng/dL Final   • TSH 06/11/2020 0.423  0.270 - 4.200 uIU/mL Final      No results found.(    ASSESSMENT: The patient is a very pleasant 69 y.o. female  with right upper lobe extensive stage small cell lung cancer     PROBLEM LIST:   1.  Right upper lobe extensive stage small cell lung cancer, [id number] stage IVb:  A.  Presented with right lower ribs pain  B.  CT chest revealed 3.5 cm right upper lobe lung mass, right adrenal nodule, mediastinal adenopathy, and left cervical lymphadenopathy.  C.  Status post left cervical lymph node biopsy done by Dr. Owen December 2, 2019  D.  Pathology consistent with small cell carcinoma  E.  Started palliative treatment with carboplatin and etoposide and Tecentriq December 15, 2019, status post 8 cycles  F.  Progressive disease documented CT scans done on June 1, 2020 with a new cervical lymphadenopathy and relapsed right adrenal mass  G.  Started Opdivo plus Yervoy Brenda 10, 2020, status post 1 cycle  2.  COPD  3.  Hypertension  4.  Hypercholesterolemia  5.  Hypothyroid  6.  GERD  7.  Progressive nausea and vomiting  8.  Headaches    PLAN:  1. We will proceed with Opdivo plus Yervoy cycle #2, this is in compliance with NCCN guidelines.  2. The patient will follow up with us in 3 weeks for cycle #3.  3.  I will repeat the patient's scans after cycle #4 if she is having good response to treatment will consider Opdivo maintenance.  4.  I will continue to monitor the patient blood work including blood counts kidney function liver function electrolytes function.  5.  I will continue the patient on Zofran as needed for chemotherapy-induced nausea.  We will switch to ODT form.  We will add Zyprexa 5 mg daily.  6.   We reviewed potential side effects of immunotherapy including but not limited to immune mediated reactions with thyroiditis, pneumonitis, hepatitis, colitis, rash, and electrolytes abnormalities, fatigue, multiorgan failure, and possibly death.  7.  I will continue to monitor patient blood pressure will consider adjusting her antihypertensive agents if needed while she is on active cancer treatment.  8.  We will continue Synthroid daily for hypothyroid.  Patient is at risk for thyroiditis while she is on immunotherapy.  9. We will continue omeprazole for GERD.   10.  I will order MRI brain to rule out brain metastases given her new onset headaches were  11.  I will add IV fluids only of normal saline weekly given her dehydration.  We can do it more often if needed.    Rupesh Cao MD  7/1/2020

## 2020-07-02 LAB — ACTH PLAS-MCNC: 10.5 PG/ML (ref 7.2–63.3)

## 2020-07-08 ENCOUNTER — INFUSION (OUTPATIENT)
Dept: ONCOLOGY | Facility: HOSPITAL | Age: 70
End: 2020-07-08

## 2020-07-08 VITALS
RESPIRATION RATE: 14 BRPM | SYSTOLIC BLOOD PRESSURE: 131 MMHG | DIASTOLIC BLOOD PRESSURE: 75 MMHG | TEMPERATURE: 98.4 F | HEART RATE: 113 BPM

## 2020-07-08 DIAGNOSIS — C34.90 SCLC (SMALL CELL LUNG CARCINOMA) (HCC): Primary | ICD-10-CM

## 2020-07-08 PROCEDURE — 96361 HYDRATE IV INFUSION ADD-ON: CPT

## 2020-07-08 PROCEDURE — 25010000003 HEPARIN LOCK FLUSH PER 10 UNITS: Performed by: INTERNAL MEDICINE

## 2020-07-08 PROCEDURE — 96360 HYDRATION IV INFUSION INIT: CPT

## 2020-07-08 RX ORDER — SODIUM CHLORIDE 0.9 % (FLUSH) 0.9 %
10 SYRINGE (ML) INJECTION AS NEEDED
Status: DISCONTINUED | OUTPATIENT
Start: 2020-07-08 | End: 2020-07-08 | Stop reason: HOSPADM

## 2020-07-08 RX ORDER — SODIUM CHLORIDE 0.9 % (FLUSH) 0.9 %
10 SYRINGE (ML) INJECTION AS NEEDED
Status: CANCELLED | OUTPATIENT
Start: 2020-07-08

## 2020-07-08 RX ORDER — HEPARIN SODIUM (PORCINE) LOCK FLUSH IV SOLN 100 UNIT/ML 100 UNIT/ML
500 SOLUTION INTRAVENOUS AS NEEDED
Status: CANCELLED | OUTPATIENT
Start: 2020-07-08

## 2020-07-08 RX ORDER — HEPARIN SODIUM (PORCINE) LOCK FLUSH IV SOLN 100 UNIT/ML 100 UNIT/ML
500 SOLUTION INTRAVENOUS AS NEEDED
Status: DISCONTINUED | OUTPATIENT
Start: 2020-07-08 | End: 2020-07-08 | Stop reason: HOSPADM

## 2020-07-08 RX ADMIN — SODIUM CHLORIDE, PRESERVATIVE FREE 10 ML: 5 INJECTION INTRAVENOUS at 15:30

## 2020-07-08 RX ADMIN — HEPARIN 500 UNITS: 100 SYRINGE at 15:29

## 2020-07-08 RX ADMIN — SODIUM CHLORIDE 1000 ML: 9 INJECTION, SOLUTION INTRAVENOUS at 13:54

## 2020-07-15 ENCOUNTER — INFUSION (OUTPATIENT)
Dept: ONCOLOGY | Facility: HOSPITAL | Age: 70
End: 2020-07-15

## 2020-07-15 VITALS
SYSTOLIC BLOOD PRESSURE: 142 MMHG | DIASTOLIC BLOOD PRESSURE: 79 MMHG | RESPIRATION RATE: 14 BRPM | HEART RATE: 113 BPM | TEMPERATURE: 98.3 F

## 2020-07-15 DIAGNOSIS — C34.90 SCLC (SMALL CELL LUNG CARCINOMA) (HCC): Primary | ICD-10-CM

## 2020-07-15 PROCEDURE — 96360 HYDRATION IV INFUSION INIT: CPT

## 2020-07-15 PROCEDURE — 96361 HYDRATE IV INFUSION ADD-ON: CPT

## 2020-07-15 PROCEDURE — 25010000003 HEPARIN LOCK FLUSH PER 10 UNITS: Performed by: INTERNAL MEDICINE

## 2020-07-15 RX ORDER — HEPARIN SODIUM (PORCINE) LOCK FLUSH IV SOLN 100 UNIT/ML 100 UNIT/ML
500 SOLUTION INTRAVENOUS AS NEEDED
Status: DISCONTINUED | OUTPATIENT
Start: 2020-07-15 | End: 2020-07-15 | Stop reason: HOSPADM

## 2020-07-15 RX ORDER — HEPARIN SODIUM (PORCINE) LOCK FLUSH IV SOLN 100 UNIT/ML 100 UNIT/ML
500 SOLUTION INTRAVENOUS AS NEEDED
Status: CANCELLED | OUTPATIENT
Start: 2020-07-15

## 2020-07-15 RX ORDER — SODIUM CHLORIDE 0.9 % (FLUSH) 0.9 %
10 SYRINGE (ML) INJECTION AS NEEDED
Status: CANCELLED | OUTPATIENT
Start: 2020-07-15

## 2020-07-15 RX ORDER — SODIUM CHLORIDE 0.9 % (FLUSH) 0.9 %
10 SYRINGE (ML) INJECTION AS NEEDED
Status: DISCONTINUED | OUTPATIENT
Start: 2020-07-15 | End: 2020-07-15 | Stop reason: HOSPADM

## 2020-07-15 RX ADMIN — SODIUM CHLORIDE 1000 ML: 9 INJECTION, SOLUTION INTRAVENOUS at 13:10

## 2020-07-15 RX ADMIN — HEPARIN 500 UNITS: 100 SYRINGE at 14:46

## 2020-07-15 RX ADMIN — SODIUM CHLORIDE, PRESERVATIVE FREE 10 ML: 5 INJECTION INTRAVENOUS at 14:46

## 2020-07-17 ENCOUNTER — HOSPITAL ENCOUNTER (OUTPATIENT)
Dept: MRI IMAGING | Facility: HOSPITAL | Age: 70
Discharge: HOME | End: 2020-07-17
Admitting: INTERNAL MEDICINE

## 2020-07-17 DIAGNOSIS — C34.90 SCLC (SMALL CELL LUNG CARCINOMA) (HCC): ICD-10-CM

## 2020-07-17 PROCEDURE — 70553 MRI BRAIN STEM W/O & W/DYE: CPT

## 2020-07-17 PROCEDURE — 0 GADOBENATE DIMEGLUMINE 529 MG/ML SOLUTION: Performed by: INTERNAL MEDICINE

## 2020-07-17 PROCEDURE — A9577 INJ MULTIHANCE: HCPCS | Performed by: INTERNAL MEDICINE

## 2020-07-17 RX ADMIN — GADOBENATE DIMEGLUMINE 11 ML: 529 INJECTION, SOLUTION INTRAVENOUS at 12:56

## 2020-07-22 ENCOUNTER — OFFICE VISIT (OUTPATIENT)
Dept: ONCOLOGY | Facility: CLINIC | Age: 70
End: 2020-07-22

## 2020-07-22 ENCOUNTER — INFUSION (OUTPATIENT)
Dept: ONCOLOGY | Facility: HOSPITAL | Age: 70
End: 2020-07-22

## 2020-07-22 VITALS
OXYGEN SATURATION: 98 % | SYSTOLIC BLOOD PRESSURE: 179 MMHG | WEIGHT: 122 LBS | HEART RATE: 101 BPM | TEMPERATURE: 98 F | DIASTOLIC BLOOD PRESSURE: 83 MMHG | HEIGHT: 62 IN | BODY MASS INDEX: 22.45 KG/M2 | RESPIRATION RATE: 16 BRPM

## 2020-07-22 DIAGNOSIS — C34.90 SCLC (SMALL CELL LUNG CARCINOMA) (HCC): Primary | ICD-10-CM

## 2020-07-22 LAB
ALBUMIN SERPL-MCNC: 3.8 G/DL (ref 3.5–5.2)
ALBUMIN/GLOB SERPL: 1 G/DL
ALP SERPL-CCNC: 101 U/L (ref 39–117)
ALT SERPL W P-5'-P-CCNC: 8 U/L (ref 1–33)
ANION GAP SERPL CALCULATED.3IONS-SCNC: 11 MMOL/L (ref 5–15)
AST SERPL-CCNC: 16 U/L (ref 1–32)
BASOPHILS # BLD AUTO: 0.03 10*3/MM3 (ref 0–0.2)
BASOPHILS NFR BLD AUTO: 0.3 % (ref 0–1.5)
BILIRUB SERPL-MCNC: 0.2 MG/DL (ref 0–1.2)
BUN SERPL-MCNC: 10 MG/DL (ref 8–23)
BUN/CREAT SERPL: 13.5 (ref 7–25)
CALCIUM SPEC-SCNC: 9.8 MG/DL (ref 8.6–10.5)
CHLORIDE SERPL-SCNC: 92 MMOL/L (ref 98–107)
CO2 SERPL-SCNC: 25 MMOL/L (ref 22–29)
CREAT SERPL-MCNC: 0.74 MG/DL (ref 0.57–1)
DEPRECATED RDW RBC AUTO: 52.7 FL (ref 37–54)
EOSINOPHIL # BLD AUTO: 0.1 10*3/MM3 (ref 0–0.4)
EOSINOPHIL NFR BLD AUTO: 1.1 % (ref 0.3–6.2)
ERYTHROCYTE [DISTWIDTH] IN BLOOD BY AUTOMATED COUNT: 17 % (ref 12.3–15.4)
GFR SERPL CREATININE-BSD FRML MDRD: 78 ML/MIN/1.73
GLOBULIN UR ELPH-MCNC: 3.9 GM/DL
GLUCOSE SERPL-MCNC: 106 MG/DL (ref 65–99)
HCT VFR BLD AUTO: 36 % (ref 34–46.6)
HGB BLD-MCNC: 11.4 G/DL (ref 12–15.9)
IMM GRANULOCYTES # BLD AUTO: 0.02 10*3/MM3 (ref 0–0.05)
IMM GRANULOCYTES NFR BLD AUTO: 0.2 % (ref 0–0.5)
LYMPHOCYTES # BLD AUTO: 2.07 10*3/MM3 (ref 0.7–3.1)
LYMPHOCYTES NFR BLD AUTO: 22.6 % (ref 19.6–45.3)
MCH RBC QN AUTO: 26.9 PG (ref 26.6–33)
MCHC RBC AUTO-ENTMCNC: 31.7 G/DL (ref 31.5–35.7)
MCV RBC AUTO: 84.9 FL (ref 79–97)
MONOCYTES # BLD AUTO: 1.14 10*3/MM3 (ref 0.1–0.9)
MONOCYTES NFR BLD AUTO: 12.5 % (ref 5–12)
NEUTROPHILS NFR BLD AUTO: 5.78 10*3/MM3 (ref 1.7–7)
NEUTROPHILS NFR BLD AUTO: 63.3 % (ref 42.7–76)
NRBC BLD AUTO-RTO: 0 /100 WBC (ref 0–0.2)
PLATELET # BLD AUTO: 316 10*3/MM3 (ref 140–450)
PMV BLD AUTO: 9.1 FL (ref 6–12)
POTASSIUM SERPL-SCNC: 4.3 MMOL/L (ref 3.5–5.2)
PROT SERPL-MCNC: 7.7 G/DL (ref 6–8.5)
RBC # BLD AUTO: 4.24 10*6/MM3 (ref 3.77–5.28)
SODIUM SERPL-SCNC: 128 MMOL/L (ref 136–145)
T4 FREE SERPL-MCNC: 1.87 NG/DL (ref 0.93–1.7)
TSH SERPL DL<=0.05 MIU/L-ACNC: 0.1 UIU/ML (ref 0.27–4.2)
WBC # BLD AUTO: 9.14 10*3/MM3 (ref 3.4–10.8)

## 2020-07-22 PROCEDURE — 96417 CHEMO IV INFUS EACH ADDL SEQ: CPT

## 2020-07-22 PROCEDURE — 25010000002 NIVOLUMAB 100 MG/10ML SOLUTION 10 ML VIAL: Performed by: INTERNAL MEDICINE

## 2020-07-22 PROCEDURE — 96413 CHEMO IV INFUSION 1 HR: CPT

## 2020-07-22 PROCEDURE — 82024 ASSAY OF ACTH: CPT

## 2020-07-22 PROCEDURE — 36415 COLL VENOUS BLD VENIPUNCTURE: CPT

## 2020-07-22 PROCEDURE — 80053 COMPREHEN METABOLIC PANEL: CPT

## 2020-07-22 PROCEDURE — 25010000002 NIVOLUMAB 40 MG/4ML SOLUTION 4 ML VIAL: Performed by: INTERNAL MEDICINE

## 2020-07-22 PROCEDURE — 85025 COMPLETE CBC W/AUTO DIFF WBC: CPT

## 2020-07-22 PROCEDURE — 99215 OFFICE O/P EST HI 40 MIN: CPT | Performed by: INTERNAL MEDICINE

## 2020-07-22 PROCEDURE — 84443 ASSAY THYROID STIM HORMONE: CPT

## 2020-07-22 PROCEDURE — 25010000003 HEPARIN LOCK FLUSH PER 10 UNITS: Performed by: INTERNAL MEDICINE

## 2020-07-22 PROCEDURE — 25010000002 IPILIMUMAB 50 MG/10ML SOLUTION 10 ML VIAL: Performed by: INTERNAL MEDICINE

## 2020-07-22 PROCEDURE — 84439 ASSAY OF FREE THYROXINE: CPT

## 2020-07-22 RX ORDER — HEPARIN SODIUM (PORCINE) LOCK FLUSH IV SOLN 100 UNIT/ML 100 UNIT/ML
500 SOLUTION INTRAVENOUS AS NEEDED
Status: DISCONTINUED | OUTPATIENT
Start: 2020-07-22 | End: 2020-07-22 | Stop reason: HOSPADM

## 2020-07-22 RX ORDER — SODIUM CHLORIDE 0.9 % (FLUSH) 0.9 %
10 SYRINGE (ML) INJECTION AS NEEDED
Status: CANCELLED | OUTPATIENT
Start: 2020-07-22

## 2020-07-22 RX ORDER — SODIUM CHLORIDE 9 MG/ML
250 INJECTION, SOLUTION INTRAVENOUS ONCE
Status: DISCONTINUED | OUTPATIENT
Start: 2020-07-22 | End: 2020-07-22 | Stop reason: HOSPADM

## 2020-07-22 RX ORDER — SODIUM CHLORIDE 9 MG/ML
250 INJECTION, SOLUTION INTRAVENOUS ONCE
Status: CANCELLED | OUTPATIENT
Start: 2020-08-12

## 2020-07-22 RX ORDER — HEPARIN SODIUM (PORCINE) LOCK FLUSH IV SOLN 100 UNIT/ML 100 UNIT/ML
500 SOLUTION INTRAVENOUS AS NEEDED
Status: CANCELLED | OUTPATIENT
Start: 2020-07-22

## 2020-07-22 RX ADMIN — SODIUM CHLORIDE 180 MG: 9 INJECTION, SOLUTION INTRAVENOUS at 12:00

## 2020-07-22 RX ADMIN — HEPARIN 500 UNITS: 100 SYRINGE at 13:23

## 2020-07-22 RX ADMIN — SODIUM CHLORIDE 60 MG: 9 INJECTION, SOLUTION INTRAVENOUS at 12:43

## 2020-07-22 NOTE — PROGRESS NOTES
DATE OF VISIT: 7/22/2020    REASON FOR VISIT: Followup for extensive stage small cell lung cancer     HISTORY OF PRESENT ILLNESS: The patient is a very pleasant 69 y.o. female with past medical history significant for extensive stage small cell lung cancer diagnosed December 2, 2019.  She was started on palliative treatment with carboplatin and etoposide and Tecentriq December 15, 2019. Tecentriq maintenance started 03/11/2020. The patient is here today for follow-up visit with treatment cycle #2 opdivo and yervoy.    SUBJECTIVE: Filippo is here today by herself.  She is feeling significantly better.  Headaches has resolved.  Denies any diarrhea no skin rash.    PAST MEDICAL HISTORY/SOCIAL HISTORY/FAMILY HISTORY: Reviewed by me and unchanged from my documentation done on 07/22/20.    Review of Systems   Constitutional: Negative for activity change, appetite change, chills, fatigue, fever and unexpected weight change.   HENT: Negative for hearing loss, mouth sores, nosebleeds, sore throat and trouble swallowing.    Eyes: Negative for visual disturbance.   Respiratory: Negative for cough, chest tightness, shortness of breath and wheezing.    Cardiovascular: Negative for chest pain, palpitations and leg swelling.   Gastrointestinal: Negative for abdominal distention, abdominal pain, blood in stool, constipation, diarrhea, nausea, rectal pain and vomiting.   Endocrine: Negative for cold intolerance and heat intolerance.   Genitourinary: Negative for difficulty urinating, dysuria, frequency and urgency.   Musculoskeletal: Negative for arthralgias, back pain, gait problem, joint swelling and myalgias.   Skin: Negative for rash.   Neurological: Negative for dizziness, tremors, syncope, weakness, light-headedness, numbness and headaches.   Hematological: Negative for adenopathy. Does not bruise/bleed easily.   Psychiatric/Behavioral: Negative for confusion, sleep disturbance and suicidal ideas. The patient is not  nervous/anxious.          Current Outpatient Medications:   •  acetaminophen (TYLENOL) 500 MG tablet, Take 500 mg by mouth Every 6 (Six) Hours As Needed for Mild Pain ., Disp: , Rfl:   •  amLODIPine (NORVASC) 5 MG tablet, Take 5 mg by mouth Daily., Disp: , Rfl:   •  baclofen (LIORESAL) 10 MG tablet, 1 TABLET BY MOUTH THREE TIMES A DAY START WITH ONE PILL NIGHTLY FOR A WEEK, THEN INCREASE TO TWICE A DAY FOR A WEEK, THEN THREE TIMES A DAY, Disp: 270 tablet, Rfl: 1  •  Calcium Carbonate-Vitamin D (CALCIUM-CARB 600 + D) 600-125 MG-UNIT tablet, Take 500 mg by mouth 2 (Two) Times a Day., Disp: 60 each, Rfl: 5  •  diclofenac (VOLTAREN) 75 MG EC tablet, Take 75 mg by mouth Daily As Needed., Disp: , Rfl:   •  gabapentin (NEURONTIN) 300 MG capsule, Take 1 capsule by mouth Daily With Breakfast & Lunch AND 2 capsules Every Night., Disp: 360 capsule, Rfl: 1  •  levothyroxine (SYNTHROID, LEVOTHROID) 125 MCG tablet, Take 1 tablet by mouth Daily., Disp: 90 tablet, Rfl: 3  •  lidocaine (LIDODERM) 5 %, Place 1 patch on the skin as directed by provider Daily. Remove & Discard patch within 12 hours or as directed by MD, Disp: 90 each, Rfl: 3  •  Lidocaine Viscous HCl (XYLOCAINE) 2 % solution, , Disp: , Rfl:   •  lidocaine-prilocaine (EMLA) 2.5-2.5 % cream, Apply  topically to the appropriate area as directed As Needed (45-60 minutes prior to port access.  Cover with saran/plastic wrap.)., Disp: 30 g, Rfl: 3  •  magic mouthwash oral suspension, Swish and spit (or swallow) 1-2 Teaspoonfuls (5-10ml) 4 times a day as needed, Disp: 240 mL, Rfl: 3  •  OLANZapine (ZyPREXA) 5 MG tablet, Take 1 tablet by mouth Every Night for 30 days., Disp: 30 tablet, Rfl: 5  •  omeprazole (priLOSEC) 40 MG capsule, Take 1 capsule by mouth Daily., Disp: 90 capsule, Rfl: 2  •  ondansetron ODT (ZOFRAN-ODT) 8 MG disintegrating tablet, Place 1 tablet on the tongue Every 8 (Eight) Hours As Needed for Nausea or Vomiting., Disp: 30 tablet, Rfl: 2  •  pravastatin  "(PRAVACHOL) 40 MG tablet, Take 1 tablet by mouth Daily., Disp: 90 tablet, Rfl: 3  •  promethazine (PHENERGAN) 25 MG tablet, Take 1 tablet by mouth Every 6 (Six) Hours As Needed for Nausea or Vomiting for up to 60 doses., Disp: 45 tablet, Rfl: 5  •  tiotropium bromide-olodaterol (STIOLTO RESPIMAT) 2.5-2.5 MCG/ACT aerosol solution inhaler, Inhale 2 puffs Daily., Disp: 1 inhaler, Rfl: 5  •  traMADol (ULTRAM) 50 MG tablet, TAKE 1 TABLET BY MOUTH THREE TIMES A DAY, Disp: 270 tablet, Rfl: 1    PHYSICAL EXAMINATION:   /83   Pulse 101   Temp 98 °F (36.7 °C) (Temporal)   Resp 16   Ht 157.5 cm (62.01\")   Wt 55.3 kg (122 lb)   SpO2 98%   BMI 22.31 kg/m²    ECOG Performance Status: 1 - Symptomatic but completely ambulatory  General Appearance:  alert, cooperative, no apparent distress, appears stated age and normal weight   Neurologic/Psychiatric: A&O x 3, gait steady, appropriate affect, strength 5/5 in all muscle groups   HEENT:  Normocephalic, without obvious abnormality, mucous membranes moist   Neck: Supple, symmetrical, trachea midline, no adenopathy;  No thyromegaly, masses, or tenderness   Lungs:   Clear to auscultation bilaterally; respirations regular, even, and unlabored bilaterally   Heart:  Regular rate and rhythm, no murmurs appreciated   Abdomen:   Soft, non-tender, non-distended and no organomegaly   Lymph nodes: No cervical, supraclavicular, inguinal or axillary adenopathy noted.  Submandibular nodule noted approximately 2cm   Extremities: Normal, atraumatic; no clubbing, cyanosis, or edema    Skin: No rashes, ulcers, or suspicious lesions noted     No visits with results within 2 Week(s) from this visit.   Latest known visit with results is:   Infusion on 07/01/2020   Component Date Value Ref Range Status   • Glucose 07/01/2020 120* 65 - 99 mg/dL Final   • BUN 07/01/2020 16  8 - 23 mg/dL Final   • Creatinine 07/01/2020 1.24* 0.57 - 1.00 mg/dL Final   • Sodium 07/01/2020 123* 136 - 145 mmol/L Final "   • Potassium 07/01/2020 4.7  3.5 - 5.2 mmol/L Final   • Chloride 07/01/2020 89* 98 - 107 mmol/L Final   • CO2 07/01/2020 21.7* 22.0 - 29.0 mmol/L Final   • Calcium 07/01/2020 9.3  8.6 - 10.5 mg/dL Final   • Total Protein 07/01/2020 8.1  6.0 - 8.5 g/dL Final   • Albumin 07/01/2020 4.10  3.50 - 5.20 g/dL Final   • ALT (SGPT) 07/01/2020 10  1 - 33 U/L Final   • AST (SGOT) 07/01/2020 13  1 - 32 U/L Final   • Alkaline Phosphatase 07/01/2020 91  39 - 117 U/L Final   • Total Bilirubin 07/01/2020 0.3  0.2 - 1.2 mg/dL Final   • eGFR Non African Amer 07/01/2020 43* >60 mL/min/1.73 Final   • Globulin 07/01/2020 4.0  gm/dL Final   • A/G Ratio 07/01/2020 1.0  g/dL Final   • BUN/Creatinine Ratio 07/01/2020 12.9  7.0 - 25.0 Final   • Anion Gap 07/01/2020 12.3  5.0 - 15.0 mmol/L Final   • TSH 07/01/2020 0.858  0.270 - 4.200 uIU/mL Final   • Free T4 07/01/2020 1.83* 0.93 - 1.70 ng/dL Final   • ACTH 07/01/2020 10.5  7.2 - 63.3 pg/mL Final    ACTH reference interval for samples collected between 7 and 10 AM.   • WBC 07/01/2020 13.78* 3.40 - 10.80 10*3/mm3 Final   • RBC 07/01/2020 4.29  3.77 - 5.28 10*6/mm3 Final   • Hemoglobin 07/01/2020 11.9* 12.0 - 15.9 g/dL Final   • Hematocrit 07/01/2020 36.4  34.0 - 46.6 % Final   • MCV 07/01/2020 84.8  79.0 - 97.0 fL Final   • MCH 07/01/2020 27.7  26.6 - 33.0 pg Final   • MCHC 07/01/2020 32.7  31.5 - 35.7 g/dL Final   • RDW 07/01/2020 15.5* 12.3 - 15.4 % Final   • RDW-SD 07/01/2020 47.2  37.0 - 54.0 fl Final   • MPV 07/01/2020 8.4  6.0 - 12.0 fL Final   • Platelets 07/01/2020 341  140 - 450 10*3/mm3 Final   • Neutrophil % 07/01/2020 74.0  42.7 - 76.0 % Final   • Lymphocyte % 07/01/2020 14.0* 19.6 - 45.3 % Final   • Monocyte % 07/01/2020 10.8  5.0 - 12.0 % Final   • Eosinophil % 07/01/2020 0.1* 0.3 - 6.2 % Final   • Basophil % 07/01/2020 0.4  0.0 - 1.5 % Final   • Immature Grans % 07/01/2020 0.7* 0.0 - 0.5 % Final   • Neutrophils, Absolute 07/01/2020 10.19* 1.70 - 7.00 10*3/mm3 Final   •  Lymphocytes, Absolute 07/01/2020 1.93  0.70 - 3.10 10*3/mm3 Final   • Monocytes, Absolute 07/01/2020 1.49* 0.10 - 0.90 10*3/mm3 Final   • Eosinophils, Absolute 07/01/2020 0.02  0.00 - 0.40 10*3/mm3 Final   • Basophils, Absolute 07/01/2020 0.05  0.00 - 0.20 10*3/mm3 Final   • Immature Grans, Absolute 07/01/2020 0.10* 0.00 - 0.05 10*3/mm3 Final   • nRBC 07/01/2020 0.0  0.0 - 0.2 /100 WBC Final      Mri Brain With & Without Contrast    Result Date: 7/17/2020  Narrative: PROCEDURE: MRI BRAIN W WO CONTRAST-  HISTORY: f/u scan, persistent nausea, HA; C34.90-Malignant neoplasm of unspecified part of unspecified bronchus or lung  PROCEDURE: Multiplanar multisequence imaging of the brain was performed both before and following the administration of 15 mL MultiHance intravenous contrast.  COMPARISON: 12/13/2019.  FINDINGS: There is generalized cerebral volume loss. FLAIR hyperintensities in the periventricular white matter of the frontal lobes is consistent with chronic small vessel ischemic change. There is no mass, mass effect or midline shift. There is no hydrocephalus. There are no areas of restricted diffusion. There is no pathologic contrast enhancement.  The midbrain, josephine, cerebellum and craniocervical junction are unremarkable. The sella and pituitary gland are within normal limits. The major intracranial vasculature demonstrates the expected flow related signal. The paranasal sinuses are clear.      Impression: No evidence of metastatic disease within the brain.    This report was finalized on 7/17/2020 1:25 PM by Pamela Oliveros M.D..  (    ASSESSMENT: The patient is a very pleasant 69 y.o. female  with right upper lobe extensive stage small cell lung cancer     PROBLEM LIST:   1.  Right upper lobe extensive stage small cell lung cancer, [id number] stage IVb:  A.  Presented with right lower ribs pain  B.  CT chest revealed 3.5 cm right upper lobe lung mass, right adrenal nodule, mediastinal adenopathy, and left  cervical lymphadenopathy.  C.  Status post left cervical lymph node biopsy done by Dr. Owen December 2, 2019  D.  Pathology consistent with small cell carcinoma  E.  Started palliative treatment with carboplatin and etoposide and Tecentriq December 15, 2019, status post 8 cycles  F.  Progressive disease documented CT scans done on June 1, 2020 with a new cervical lymphadenopathy and relapsed right adrenal mass  G.  Started Opdivo plus Yervoy Brenda 10, 2020, status post 1 cycle  2.  COPD  3.  Hypertension  4.  Hypercholesterolemia  5.  Hypothyroid  6.  GERD  7.  Progressive nausea and vomiting    PLAN:  1. We will proceed with Opdivo plus Yervoy cycle #3, this is in compliance with NCCN guidelines.  2. The patient will follow up with us in 3 weeks for cycle #4.  3.  I will repeat the patient's scans after cycle #4 if she is having good response to treatment will consider Opdivo maintenance.  4.  I will continue to monitor the patient blood work including blood counts kidney function liver function electrolytes function.  5.  I will continue the patient on Zofran ODT as needed as well as Zyprexa 5 mg daily as for chemotherapy-induced nausea.    6.  We reviewed potential side effects of immunotherapy including but not limited to immune mediated reactions with thyroiditis, pneumonitis, hepatitis, colitis, rash, and electrolytes abnormalities, fatigue, multiorgan failure, and possibly death.  7.  I will continue to monitor patient blood pressure will consider adjusting her antihypertensive agents if needed while she is on active cancer treatment.  8.  We will continue Synthroid daily for hypothyroid.  Patient is at risk for thyroiditis while she is on immunotherapy.  9. We will continue omeprazole for GERD.   10.  I did go over MRI brain results with the patient that on July 17, 2020 reassured there is no evidence of brain metastases.  We will do follow-up study down the road on as-needed basis.  11.  I will add IV  fluids only of normal saline weekly given her dehydration.  We can do it more often if needed.    Rupesh Cao MD  7/22/2020

## 2020-07-23 LAB — ACTH PLAS-MCNC: 14.4 PG/ML (ref 7.2–63.3)

## 2020-08-05 ENCOUNTER — DOCUMENTATION (OUTPATIENT)
Dept: NUTRITION | Facility: HOSPITAL | Age: 70
End: 2020-08-05

## 2020-08-05 DIAGNOSIS — M25.50 CHRONIC JOINT PAIN: ICD-10-CM

## 2020-08-05 DIAGNOSIS — G89.29 CHRONIC JOINT PAIN: ICD-10-CM

## 2020-08-05 RX ORDER — BACLOFEN 10 MG/1
TABLET ORAL
Qty: 270 TABLET | Refills: 1 | Status: SHIPPED | OUTPATIENT
Start: 2020-08-05

## 2020-08-05 NOTE — PROGRESS NOTES
Nutrition Services    Patient Name:  Prashanth Prajapati  YOB: 1950  MRN: [id number]  Admit Date:  (Not on file)    RD spoke with pt over the phone for nutrition follow-up. She did have some nausea several weeks ago which she discussed with her MD and has used some medication to combat. She also received nutrition ideas for nausea by mail by GABRIEL. Pt states she does experience constipation but has started on Miralax. RD discussed benefits of increased fiber to help regulate bowel movements as well. Pt's weight dropped ~10 lbs 2 weeks ago to 122 lbs from 132 lbs per pt, however she has weighed at home and currently is 127 lbs. RD encouraged pt to reach out with any future questions or concerns. Pt has RD contact info, RD available PRN.    Electronically signed by:  Jennifer Stern RD  08/05/20 13:21

## 2020-08-13 ENCOUNTER — OFFICE VISIT (OUTPATIENT)
Dept: ONCOLOGY | Facility: CLINIC | Age: 70
End: 2020-08-13

## 2020-08-13 ENCOUNTER — HOSPITAL ENCOUNTER (OUTPATIENT)
Dept: GENERAL RADIOLOGY | Facility: HOSPITAL | Age: 70
Discharge: HOME | End: 2020-08-13
Admitting: NURSE PRACTITIONER

## 2020-08-13 ENCOUNTER — TELEPHONE (OUTPATIENT)
Dept: ONCOLOGY | Facility: CLINIC | Age: 70
End: 2020-08-13

## 2020-08-13 ENCOUNTER — INFUSION (OUTPATIENT)
Dept: ONCOLOGY | Facility: HOSPITAL | Age: 70
End: 2020-08-13

## 2020-08-13 VITALS
SYSTOLIC BLOOD PRESSURE: 159 MMHG | BODY MASS INDEX: 22.45 KG/M2 | OXYGEN SATURATION: 95 % | TEMPERATURE: 98.2 F | WEIGHT: 122 LBS | HEART RATE: 99 BPM | HEIGHT: 62 IN | DIASTOLIC BLOOD PRESSURE: 84 MMHG | RESPIRATION RATE: 12 BRPM

## 2020-08-13 DIAGNOSIS — L03.116 CELLULITIS OF LEFT FOOT: ICD-10-CM

## 2020-08-13 DIAGNOSIS — M25.472 ANKLE SWELLING, LEFT: ICD-10-CM

## 2020-08-13 DIAGNOSIS — R59.0 LYMPHADENOPATHY, SUPRACLAVICULAR: ICD-10-CM

## 2020-08-13 DIAGNOSIS — C80.1 NEUROPATHY ASSOCIATED WITH CANCER (HCC): ICD-10-CM

## 2020-08-13 DIAGNOSIS — G63 NEUROPATHY ASSOCIATED WITH CANCER (HCC): ICD-10-CM

## 2020-08-13 DIAGNOSIS — C34.90 SCLC (SMALL CELL LUNG CARCINOMA) (HCC): Primary | ICD-10-CM

## 2020-08-13 LAB
ALBUMIN SERPL-MCNC: 3.8 G/DL (ref 3.5–5.2)
ALBUMIN/GLOB SERPL: 1 G/DL
ALP SERPL-CCNC: 94 U/L (ref 39–117)
ALT SERPL W P-5'-P-CCNC: 10 U/L (ref 1–33)
ANION GAP SERPL CALCULATED.3IONS-SCNC: 10.3 MMOL/L (ref 5–15)
AST SERPL-CCNC: 16 U/L (ref 1–32)
BASOPHILS # BLD AUTO: 0.05 10*3/MM3 (ref 0–0.2)
BASOPHILS NFR BLD AUTO: 0.5 % (ref 0–1.5)
BILIRUB SERPL-MCNC: 0.3 MG/DL (ref 0–1.2)
BUN SERPL-MCNC: 9 MG/DL (ref 8–23)
BUN/CREAT SERPL: 13.8 (ref 7–25)
CALCIUM SPEC-SCNC: 9.4 MG/DL (ref 8.6–10.5)
CHLORIDE SERPL-SCNC: 90 MMOL/L (ref 98–107)
CO2 SERPL-SCNC: 25.7 MMOL/L (ref 22–29)
CREAT SERPL-MCNC: 0.65 MG/DL (ref 0.57–1)
DEPRECATED RDW RBC AUTO: 57.1 FL (ref 37–54)
EOSINOPHIL # BLD AUTO: 0.06 10*3/MM3 (ref 0–0.4)
EOSINOPHIL NFR BLD AUTO: 0.6 % (ref 0.3–6.2)
ERYTHROCYTE [DISTWIDTH] IN BLOOD BY AUTOMATED COUNT: 18.5 % (ref 12.3–15.4)
GFR SERPL CREATININE-BSD FRML MDRD: 91 ML/MIN/1.73
GLOBULIN UR ELPH-MCNC: 4 GM/DL
GLUCOSE SERPL-MCNC: 109 MG/DL (ref 65–99)
HCT VFR BLD AUTO: 34.4 % (ref 34–46.6)
HGB BLD-MCNC: 11.2 G/DL (ref 12–15.9)
IMM GRANULOCYTES # BLD AUTO: 0.06 10*3/MM3 (ref 0–0.05)
IMM GRANULOCYTES NFR BLD AUTO: 0.6 % (ref 0–0.5)
LYMPHOCYTES # BLD AUTO: 2.17 10*3/MM3 (ref 0.7–3.1)
LYMPHOCYTES NFR BLD AUTO: 21 % (ref 19.6–45.3)
MCH RBC QN AUTO: 27.6 PG (ref 26.6–33)
MCHC RBC AUTO-ENTMCNC: 32.6 G/DL (ref 31.5–35.7)
MCV RBC AUTO: 84.7 FL (ref 79–97)
MONOCYTES # BLD AUTO: 1.26 10*3/MM3 (ref 0.1–0.9)
MONOCYTES NFR BLD AUTO: 12.2 % (ref 5–12)
NEUTROPHILS NFR BLD AUTO: 6.73 10*3/MM3 (ref 1.7–7)
NEUTROPHILS NFR BLD AUTO: 65.1 % (ref 42.7–76)
NRBC BLD AUTO-RTO: 0 /100 WBC (ref 0–0.2)
PLATELET # BLD AUTO: 292 10*3/MM3 (ref 140–450)
PMV BLD AUTO: 8.5 FL (ref 6–12)
POTASSIUM SERPL-SCNC: 4.4 MMOL/L (ref 3.5–5.2)
PROT SERPL-MCNC: 7.8 G/DL (ref 6–8.5)
RBC # BLD AUTO: 4.06 10*6/MM3 (ref 3.77–5.28)
SODIUM SERPL-SCNC: 126 MMOL/L (ref 136–145)
T4 FREE SERPL-MCNC: 2.06 NG/DL (ref 0.93–1.7)
TSH SERPL DL<=0.05 MIU/L-ACNC: 0.09 UIU/ML (ref 0.27–4.2)
WBC # BLD AUTO: 10.33 10*3/MM3 (ref 3.4–10.8)

## 2020-08-13 PROCEDURE — 96375 TX/PRO/DX INJ NEW DRUG ADDON: CPT

## 2020-08-13 PROCEDURE — 96361 HYDRATE IV INFUSION ADD-ON: CPT

## 2020-08-13 PROCEDURE — 36415 COLL VENOUS BLD VENIPUNCTURE: CPT

## 2020-08-13 PROCEDURE — 96413 CHEMO IV INFUSION 1 HR: CPT

## 2020-08-13 PROCEDURE — 25010000002 NIVOLUMAB 40 MG/4ML SOLUTION 4 ML VIAL: Performed by: INTERNAL MEDICINE

## 2020-08-13 PROCEDURE — 80053 COMPREHEN METABOLIC PANEL: CPT

## 2020-08-13 PROCEDURE — 99214 OFFICE O/P EST MOD 30 MIN: CPT | Performed by: NURSE PRACTITIONER

## 2020-08-13 PROCEDURE — 36591 DRAW BLOOD OFF VENOUS DEVICE: CPT

## 2020-08-13 PROCEDURE — 84439 ASSAY OF FREE THYROXINE: CPT

## 2020-08-13 PROCEDURE — 25010000002 IPILIMUMAB 50 MG/10ML SOLUTION 10 ML VIAL: Performed by: INTERNAL MEDICINE

## 2020-08-13 PROCEDURE — 82024 ASSAY OF ACTH: CPT

## 2020-08-13 PROCEDURE — 25010000003 HEPARIN LOCK FLUSH PER 10 UNITS: Performed by: INTERNAL MEDICINE

## 2020-08-13 PROCEDURE — 25010000002 ONDANSETRON PER 1 MG: Performed by: NURSE PRACTITIONER

## 2020-08-13 PROCEDURE — 25010000002 NIVOLUMAB 100 MG/10ML SOLUTION 10 ML VIAL: Performed by: INTERNAL MEDICINE

## 2020-08-13 PROCEDURE — 85025 COMPLETE CBC W/AUTO DIFF WBC: CPT

## 2020-08-13 PROCEDURE — 96417 CHEMO IV INFUS EACH ADDL SEQ: CPT

## 2020-08-13 PROCEDURE — 73610 X-RAY EXAM OF ANKLE: CPT

## 2020-08-13 PROCEDURE — 84443 ASSAY THYROID STIM HORMONE: CPT

## 2020-08-13 RX ORDER — SODIUM CHLORIDE 0.9 % (FLUSH) 0.9 %
10 SYRINGE (ML) INJECTION AS NEEDED
Status: CANCELLED | OUTPATIENT
Start: 2020-08-13

## 2020-08-13 RX ORDER — DOXYCYCLINE HYCLATE 100 MG
100 TABLET ORAL 2 TIMES DAILY
Qty: 14 TABLET | Refills: 0 | Status: SHIPPED | OUTPATIENT
Start: 2020-08-13 | End: 2020-08-20

## 2020-08-13 RX ORDER — SODIUM CHLORIDE 9 MG/ML
1000 INJECTION, SOLUTION INTRAVENOUS CONTINUOUS
Status: CANCELLED
Start: 2020-08-13

## 2020-08-13 RX ORDER — HEPARIN SODIUM (PORCINE) LOCK FLUSH IV SOLN 100 UNIT/ML 100 UNIT/ML
500 SOLUTION INTRAVENOUS AS NEEDED
Status: DISCONTINUED | OUTPATIENT
Start: 2020-08-13 | End: 2020-08-17 | Stop reason: HOSPADM

## 2020-08-13 RX ORDER — ONDANSETRON 2 MG/ML
8 INJECTION INTRAMUSCULAR; INTRAVENOUS ONCE
Status: COMPLETED | OUTPATIENT
Start: 2020-08-13 | End: 2020-08-13

## 2020-08-13 RX ORDER — SODIUM CHLORIDE 9 MG/ML
1000 INJECTION, SOLUTION INTRAVENOUS CONTINUOUS
Status: DISCONTINUED | OUTPATIENT
Start: 2020-08-13 | End: 2020-08-17 | Stop reason: HOSPADM

## 2020-08-13 RX ORDER — HEPARIN SODIUM (PORCINE) LOCK FLUSH IV SOLN 100 UNIT/ML 100 UNIT/ML
500 SOLUTION INTRAVENOUS AS NEEDED
Status: CANCELLED | OUTPATIENT
Start: 2020-08-13

## 2020-08-13 RX ORDER — SODIUM CHLORIDE 0.9 % (FLUSH) 0.9 %
10 SYRINGE (ML) INJECTION AS NEEDED
Status: DISCONTINUED | OUTPATIENT
Start: 2020-08-13 | End: 2020-08-17 | Stop reason: HOSPADM

## 2020-08-13 RX ORDER — SODIUM CHLORIDE 9 MG/ML
250 INJECTION, SOLUTION INTRAVENOUS ONCE
Status: DISCONTINUED | OUTPATIENT
Start: 2020-08-13 | End: 2020-08-17 | Stop reason: HOSPADM

## 2020-08-13 RX ADMIN — SODIUM CHLORIDE 180 MG: 9 INJECTION, SOLUTION INTRAVENOUS at 11:45

## 2020-08-13 RX ADMIN — SODIUM CHLORIDE, PRESERVATIVE FREE 10 ML: 5 INJECTION INTRAVENOUS at 13:31

## 2020-08-13 RX ADMIN — HEPARIN 500 UNITS: 100 SYRINGE at 13:32

## 2020-08-13 RX ADMIN — ONDANSETRON 8 MG: 2 INJECTION INTRAMUSCULAR; INTRAVENOUS at 12:14

## 2020-08-13 RX ADMIN — SODIUM CHLORIDE 60 MG: 9 INJECTION, SOLUTION INTRAVENOUS at 12:37

## 2020-08-13 RX ADMIN — SODIUM CHLORIDE 1000 ML: 9 INJECTION, SOLUTION INTRAVENOUS at 11:27

## 2020-08-13 NOTE — TELEPHONE ENCOUNTER
Left message for patient that XR showed no trauma. It does show Enthesophytes. I advised patient to decrease inflammation with rest from activity, elevation, and continue cold application.  Continue with antibiotics. Call if no improvement.

## 2020-08-13 NOTE — PROGRESS NOTES
DATE OF VISIT: 8/13/2020    REASON FOR VISIT: Followup for extensive stage small cell lung cancer     HISTORY OF PRESENT ILLNESS: The patient is a very pleasant 69 y.o. female with past medical history significant for extensive stage small cell lung cancer diagnosed December 2, 2019.  She was started on palliative treatment with carboplatin and etoposide and Tecentriq December 15, 2019. Tecentriq maintenance started 03/11/2020. The patient is here today for follow-up visit with treatment cycle #4 opdivo and yervoy.    SUBJECTIVE: Filippo is here today by herself.  She is feeling significantly better.  Denies headaches.  Denies any diarrhea no skin rash.  She has some swelling in bilateral lower extremities. Left is worse right. Denies trauma. [place]. No obvious bites or cuts.      PAST MEDICAL HISTORY/SOCIAL HISTORY/FAMILY HISTORY: Reviewed by me and unchanged from my documentation done on 08/13/20.    Review of Systems   Constitutional: Negative for activity change, appetite change, chills, fatigue, fever and unexpected weight change.   HENT: Negative for hearing loss, mouth sores, nosebleeds, sore throat and trouble swallowing.    Eyes: Negative for visual disturbance.   Respiratory: Negative for cough, chest tightness, shortness of breath and wheezing.    Cardiovascular: Positive for leg swelling. Negative for chest pain and palpitations.   Gastrointestinal: Negative for abdominal distention, abdominal pain, blood in stool, constipation, diarrhea, nausea, rectal pain and vomiting.   Endocrine: Negative for cold intolerance and heat intolerance.   Genitourinary: Negative for difficulty urinating, dysuria, frequency and urgency.   Musculoskeletal: Negative for arthralgias, back pain, gait problem, joint swelling and myalgias.   Skin: Negative for rash.   Neurological: Negative for dizziness, tremors, syncope, weakness, light-headedness, numbness and headaches.   Hematological: Negative  for adenopathy. Does not bruise/bleed easily.   Psychiatric/Behavioral: Negative for confusion, sleep disturbance and suicidal ideas. The patient is not nervous/anxious.          Current Outpatient Medications:   •  acetaminophen (TYLENOL) 500 MG tablet, Take 500 mg by mouth Every 6 (Six) Hours As Needed for Mild Pain ., Disp: , Rfl:   •  amLODIPine (NORVASC) 5 MG tablet, Take 5 mg by mouth Daily., Disp: , Rfl:   •  baclofen (LIORESAL) 10 MG tablet, START- ONE TAB NIGHTLY X7 DAYS, THEN 1 TAB 2X A DAY X7 DAYS, THEN 1 TAB 3X A DAY, Disp: 270 tablet, Rfl: 1  •  Calcium Carbonate-Vitamin D (CALCIUM-CARB 600 + D) 600-125 MG-UNIT tablet, Take 500 mg by mouth 2 (Two) Times a Day., Disp: 60 each, Rfl: 5  •  diclofenac (VOLTAREN) 75 MG EC tablet, Take 75 mg by mouth Daily As Needed., Disp: , Rfl:   •  gabapentin (NEURONTIN) 300 MG capsule, Take 1 capsule by mouth Daily With Breakfast & Lunch AND 2 capsules Every Night., Disp: 360 capsule, Rfl: 1  •  levothyroxine (SYNTHROID, LEVOTHROID) 125 MCG tablet, Take 1 tablet by mouth Daily., Disp: 90 tablet, Rfl: 3  •  lidocaine (LIDODERM) 5 %, Place 1 patch on the skin as directed by provider Daily. Remove & Discard patch within 12 hours or as directed by MD, Disp: 90 each, Rfl: 3  •  Lidocaine Viscous HCl (XYLOCAINE) 2 % solution, , Disp: , Rfl:   •  lidocaine-prilocaine (EMLA) 2.5-2.5 % cream, Apply  topically to the appropriate area as directed As Needed (45-60 minutes prior to port access.  Cover with saran/plastic wrap.)., Disp: 30 g, Rfl: 3  •  magic mouthwash oral suspension, Swish and spit (or swallow) 1-2 Teaspoonfuls (5-10ml) 4 times a day as needed, Disp: 240 mL, Rfl: 3  •  omeprazole (priLOSEC) 40 MG capsule, Take 1 capsule by mouth Daily., Disp: 90 capsule, Rfl: 2  •  ondansetron ODT (ZOFRAN-ODT) 8 MG disintegrating tablet, Place 1 tablet on the tongue Every 8 (Eight) Hours As Needed for Nausea or Vomiting., Disp: 30 tablet, Rfl: 2  •  pravastatin (PRAVACHOL) 40 MG  "tablet, Take 1 tablet by mouth Daily., Disp: 90 tablet, Rfl: 3  •  promethazine (PHENERGAN) 25 MG tablet, Take 1 tablet by mouth Every 6 (Six) Hours As Needed for Nausea or Vomiting for up to 60 doses., Disp: 45 tablet, Rfl: 5  •  tiotropium bromide-olodaterol (STIOLTO RESPIMAT) 2.5-2.5 MCG/ACT aerosol solution inhaler, Inhale 2 puffs Daily., Disp: 1 inhaler, Rfl: 5  •  traMADol (ULTRAM) 50 MG tablet, TAKE 1 TABLET BY MOUTH THREE TIMES A DAY, Disp: 270 tablet, Rfl: 1    PHYSICAL EXAMINATION:   /84   Pulse 99   Temp 98.2 °F (36.8 °C) (Temporal)   Resp 12   Ht 157.5 cm (62.01\")   Wt 55.3 kg (122 lb)   SpO2 95%   BMI 22.31 kg/m²    ECOG Performance Status: 1 - Symptomatic but completely ambulatory  General Appearance:  alert, cooperative, no apparent distress, appears stated age and normal weight   Neurologic/Psychiatric: A&O x 3, gait steady, appropriate affect, strength 5/5 in all muscle groups   HEENT:  Normocephalic, without obvious abnormality, mucous membranes moist   Neck: Supple, symmetrical, trachea midline, no adenopathy;  No thyromegaly, masses, or tenderness   Lungs:   Clear to auscultation bilaterally; respirations regular, even, and unlabored bilaterally   Heart:  Regular rate and rhythm, no murmurs appreciated   Abdomen:   Soft, non-tender, non-distended and no organomegaly   Lymph nodes: No cervical, supraclavicular, inguinal or axillary adenopathy noted.  Submandibular nodule noted approximately 2cm   Extremities: Normal, atraumatic; no clubbing, cyanosis, or edema    Skin: No rashes, ulcers, or suspicious lesions noted     No visits with results within 2 Week(s) from this visit.   Latest known visit with results is:   Infusion on 07/22/2020   Component Date Value Ref Range Status   • Glucose 07/22/2020 106* 65 - 99 mg/dL Final   • BUN 07/22/2020 10  8 - 23 mg/dL Final   • Creatinine 07/22/2020 0.74  0.57 - 1.00 mg/dL Final   • Sodium 07/22/2020 128* 136 - 145 mmol/L Final   • Potassium " 07/22/2020 4.3  3.5 - 5.2 mmol/L Final   • Chloride 07/22/2020 92* 98 - 107 mmol/L Final   • CO2 07/22/2020 25.0  22.0 - 29.0 mmol/L Final   • Calcium 07/22/2020 9.8  8.6 - 10.5 mg/dL Final   • Total Protein 07/22/2020 7.7  6.0 - 8.5 g/dL Final   • Albumin 07/22/2020 3.80  3.50 - 5.20 g/dL Final   • ALT (SGPT) 07/22/2020 8  1 - 33 U/L Final   • AST (SGOT) 07/22/2020 16  1 - 32 U/L Final   • Alkaline Phosphatase 07/22/2020 101  39 - 117 U/L Final   • Total Bilirubin 07/22/2020 0.2  0.0 - 1.2 mg/dL Final   • eGFR Non African Amer 07/22/2020 78  >60 mL/min/1.73 Final   • Globulin 07/22/2020 3.9  gm/dL Final   • A/G Ratio 07/22/2020 1.0  g/dL Final   • BUN/Creatinine Ratio 07/22/2020 13.5  7.0 - 25.0 Final   • Anion Gap 07/22/2020 11.0  5.0 - 15.0 mmol/L Final   • TSH 07/22/2020 0.097* 0.270 - 4.200 uIU/mL Final   • Free T4 07/22/2020 1.87* 0.93 - 1.70 ng/dL Final   • ACTH 07/22/2020 14.4  7.2 - 63.3 pg/mL Final    ACTH reference interval for samples collected between 7 and 10 AM.   • WBC 07/22/2020 9.14  3.40 - 10.80 10*3/mm3 Final   • RBC 07/22/2020 4.24  3.77 - 5.28 10*6/mm3 Final   • Hemoglobin 07/22/2020 11.4* 12.0 - 15.9 g/dL Final   • Hematocrit 07/22/2020 36.0  34.0 - 46.6 % Final   • MCV 07/22/2020 84.9  79.0 - 97.0 fL Final   • MCH 07/22/2020 26.9  26.6 - 33.0 pg Final   • MCHC 07/22/2020 31.7  31.5 - 35.7 g/dL Final   • RDW 07/22/2020 17.0* 12.3 - 15.4 % Final   • RDW-SD 07/22/2020 52.7  37.0 - 54.0 fl Final   • MPV 07/22/2020 9.1  6.0 - 12.0 fL Final   • Platelets 07/22/2020 316  140 - 450 10*3/mm3 Final   • Neutrophil % 07/22/2020 63.3  42.7 - 76.0 % Final   • Lymphocyte % 07/22/2020 22.6  19.6 - 45.3 % Final   • Monocyte % 07/22/2020 12.5* 5.0 - 12.0 % Final   • Eosinophil % 07/22/2020 1.1  0.3 - 6.2 % Final   • Basophil % 07/22/2020 0.3  0.0 - 1.5 % Final   • Immature Grans % 07/22/2020 0.2  0.0 - 0.5 % Final   • Neutrophils, Absolute 07/22/2020 5.78  1.70 - 7.00 10*3/mm3 Final   • Lymphocytes, Absolute  07/22/2020 2.07  0.70 - 3.10 10*3/mm3 Final   • Monocytes, Absolute 07/22/2020 1.14* 0.10 - 0.90 10*3/mm3 Final   • Eosinophils, Absolute 07/22/2020 0.10  0.00 - 0.40 10*3/mm3 Final   • Basophils, Absolute 07/22/2020 0.03  0.00 - 0.20 10*3/mm3 Final   • Immature Grans, Absolute 07/22/2020 0.02  0.00 - 0.05 10*3/mm3 Final   • nRBC 07/22/2020 0.0  0.0 - 0.2 /100 WBC Final      Mri Brain With & Without Contrast    Result Date: 7/17/2020  Narrative: PROCEDURE: MRI BRAIN W WO CONTRAST-  HISTORY: f/u scan, persistent nausea, HA; C34.90-Malignant neoplasm of unspecified part of unspecified bronchus or lung  PROCEDURE: Multiplanar multisequence imaging of the brain was performed both before and following the administration of 15 mL MultiHance intravenous contrast.  COMPARISON: 12/13/2019.  FINDINGS: There is generalized cerebral volume loss. FLAIR hyperintensities in the periventricular white matter of the frontal lobes is consistent with chronic small vessel ischemic change. There is no mass, mass effect or midline shift. There is no hydrocephalus. There are no areas of restricted diffusion. There is no pathologic contrast enhancement.  The midbrain, josephine, cerebellum and craniocervical junction are unremarkable. The sella and pituitary gland are within normal limits. The major intracranial vasculature demonstrates the expected flow related signal. The paranasal sinuses are clear.      Impression: No evidence of metastatic disease within the brain.    This report was finalized on 7/17/2020 1:25 PM by Pamela Oliveros M.D..  (    ASSESSMENT: The patient is a very pleasant 69 y.o. female  with right upper lobe extensive stage small cell lung cancer     PROBLEM LIST:   1.  Right upper lobe extensive stage small cell lung cancer, [id number] stage IVb:  A.  Presented with right lower ribs pain  B.  CT chest revealed 3.5 cm right upper lobe lung mass, right adrenal nodule, mediastinal adenopathy, and left cervical  lymphadenopathy.  C.  Status post left cervical lymph node biopsy done by Dr. Owen December 2, 2019  D.  Pathology consistent with small cell carcinoma  E.  Started palliative treatment with carboplatin and etoposide and Tecentriq December 15, 2019, status post 8 cycles  F.  Progressive disease documented CT scans done on June 1, 2020 with a new cervical lymphadenopathy and relapsed right adrenal mass  G.  Started Opdivo plus Yervoy Brenda 10, 2020, status post 3 cycle  2.  COPD  3.  Hypertension  4.  Hypercholesterolemia  5.  Hypothyroid  6.  GERD  7.  Progressive nausea and vomiting  8. Cellulitis left foot    PLAN:  1. We will proceed with Opdivo plus Yervoy cycle #4, this is in compliance with NCCN guidelines.  2. The patient will follow up with us in 3 weeks for tentative maintenance cycle #1 opdivo.  3.  I will repeat the patient's scans after cycle #4 if she is having good response to treatment will consider Opdivo maintenance. I will order scans prior to return.   4.  I will continue to monitor the patient blood work including blood counts kidney function liver function electrolytes function.  5.  I will continue the patient on Zofran ODT as needed as well as Zyprexa 5 mg daily as for chemotherapy-induced nausea.    6.  We reviewed potential side effects of immunotherapy including but not limited to immune mediated reactions with thyroiditis, pneumonitis, hepatitis, colitis, rash, and electrolytes abnormalities, fatigue, multiorgan failure, and possibly death.  7.  I will continue to monitor patient blood pressure will consider adjusting her antihypertensive agents if needed while she is on active cancer treatment.  8.  We will continue Synthroid daily for hypothyroid.  Patient is at risk for thyroiditis while she is on immunotherapy.  9. We will continue omeprazole for GERD.   10.  We will do follow-up study MRI down the road on as-needed basis.  11.  I will continue IV fluids only of normal saline weekly  given her dehydration.  We can do it more often if needed.  12. I will send in doxycycline for left foot cellulitis. We will check a left foot XR to rule out trauma.     Kathe Higuera, APRN  8/13/2020

## 2020-08-14 LAB — ACTH PLAS-MCNC: 18.4 PG/ML (ref 7.2–63.3)

## 2020-08-17 ENCOUNTER — HOSPITAL ENCOUNTER (OUTPATIENT)
Dept: ULTRASOUND IMAGING | Facility: HOSPITAL | Age: 70
Discharge: HOME | End: 2020-08-17
Admitting: NURSE PRACTITIONER

## 2020-08-17 ENCOUNTER — LAB (OUTPATIENT)
Dept: LAB | Facility: HOSPITAL | Age: 70
End: 2020-08-17

## 2020-08-17 ENCOUNTER — TELEPHONE (OUTPATIENT)
Dept: ONCOLOGY | Facility: CLINIC | Age: 70
End: 2020-08-17

## 2020-08-17 DIAGNOSIS — G89.29 CHRONIC JOINT PAIN: ICD-10-CM

## 2020-08-17 DIAGNOSIS — R22.42 LOCALIZED SWELLING, MASS AND LUMP, LEFT LOWER LIMB: ICD-10-CM

## 2020-08-17 DIAGNOSIS — M25.572 ACUTE LEFT ANKLE PAIN: ICD-10-CM

## 2020-08-17 DIAGNOSIS — M25.472 LEFT ANKLE SWELLING: ICD-10-CM

## 2020-08-17 DIAGNOSIS — M25.50 CHRONIC JOINT PAIN: ICD-10-CM

## 2020-08-17 DIAGNOSIS — M25.472 LEFT ANKLE SWELLING: Primary | ICD-10-CM

## 2020-08-17 LAB
CRP SERPL-MCNC: 0.45 MG/DL (ref 0–0.5)
ERYTHROCYTE [SEDIMENTATION RATE] IN BLOOD: 15 MM/HR (ref 0–20)

## 2020-08-17 PROCEDURE — 86140 C-REACTIVE PROTEIN: CPT

## 2020-08-17 PROCEDURE — 36415 COLL VENOUS BLD VENIPUNCTURE: CPT

## 2020-08-17 PROCEDURE — 85651 RBC SED RATE NONAUTOMATED: CPT

## 2020-08-17 PROCEDURE — 93971 EXTREMITY STUDY: CPT

## 2020-08-17 RX ORDER — DICLOFENAC SODIUM 75 MG/1
75 TABLET, DELAYED RELEASE ORAL DAILY PRN
Qty: 90 TABLET | Refills: 3 | Status: SHIPPED | OUTPATIENT
Start: 2020-08-17

## 2020-08-17 NOTE — PROGRESS NOTES
Returned patient call. Complete antibiotic therapy. We will check labs for ESR and CRP to rule out septic arthritis. We will attempt to get a STAT duplex on left lower extremity to rule out clot. I will refer to orthopedic. She has a history of tendonitis in left foot. She has seen a podiatrist in the past for achilles tendonitis. I will call her with results.

## 2020-08-17 NOTE — TELEPHONE ENCOUNTER
Caller: Prashanth Prajapati    Relationship: Self    Best call back number: [phone]     Medication needed:   Requested Prescriptions     Pending Prescriptions Disp Refills   • diclofenac (VOLTAREN) 75 MG EC tablet       Sig: Take 1 tablet by mouth Daily As Needed.       When do you need the refill by: ASAP    What details did the patient provide when requesting the medication: PATIENT HAS TAKEN THIS MEDICATION FOR INFLAMED ACHILLIS TENDON AND APPEARS TO BE HAVING FLARE UP. DR. JEAN FROM DR. OLEARY OFFICE DID ORDER AN XRAY AND SHOULD HAVE RESULTS TODAY.      Does the patient have less than a 3 day supply:  [x] Yes  [] No    What is the patient's preferred pharmacy:      [facility]/pharm[facility] #[id number] - [place], [place] - [address] [facility] [address] [phone] [facility] [phone] FX

## 2020-08-17 NOTE — TELEPHONE ENCOUNTER
Patient called she would like a call back from Kathe, she has no change in her ankle, also would like results on her X-ray  Best call back [phone]

## 2020-08-18 ENCOUNTER — TELEPHONE (OUTPATIENT)
Dept: ONCOLOGY | Facility: CLINIC | Age: 70
End: 2020-08-18

## 2020-08-18 NOTE — TELEPHONE ENCOUNTER
Discussed with patient duplex was negative. Labs were WNL. We will continue with current plan for patient to follow up with podiatrist for further recommendations. Patient agreeable to plan. She would like to stay in [place] so I will refer to podiatrist in [place].

## 2020-08-28 DIAGNOSIS — I10 ESSENTIAL HYPERTENSION: ICD-10-CM

## 2020-08-28 RX ORDER — AMLODIPINE BESYLATE 5 MG/1
5 TABLET ORAL DAILY
Qty: 90 TABLET | Refills: 3 | Status: SHIPPED | OUTPATIENT
Start: 2020-08-28

## 2020-08-28 NOTE — TELEPHONE ENCOUNTER
Caller: Prashanth Prajapati    Relationship: Self    Best call back number: [phone]     Medication needed:   Requested Prescriptions     Pending Prescriptions Disp Refills   • amLODIPine (NORVASC) 5 MG tablet       Sig: Take 1 tablet by mouth Daily.       When do you need the refill by:   PATIENT HAS ENOUGH MEDICATION TO GET HER BY FOR AWHILE BUT WAS TOLD FROM HER PHARMACY THAT HER PRESCRIPTION WAS DENIED. SHE WASN'T SURE WHY.     What details did the patient provide when requesting the medication:     Does the patient have less than a 3 day supply:  [] Yes  [x] No    What is the patient's preferred pharmacy:    [facility]

## 2020-08-31 ENCOUNTER — HOSPITAL ENCOUNTER (OUTPATIENT)
Dept: CT IMAGING | Facility: HOSPITAL | Age: 70
Discharge: HOME | End: 2020-08-31

## 2020-08-31 ENCOUNTER — HOSPITAL ENCOUNTER (OUTPATIENT)
Dept: CT IMAGING | Facility: HOSPITAL | Age: 70
Discharge: HOME | End: 2020-08-31
Admitting: NURSE PRACTITIONER

## 2020-08-31 DIAGNOSIS — C34.90 SCLC (SMALL CELL LUNG CARCINOMA) (HCC): ICD-10-CM

## 2020-08-31 DIAGNOSIS — R59.0 LYMPHADENOPATHY, SUPRACLAVICULAR: ICD-10-CM

## 2020-08-31 PROCEDURE — 25010000002 IOPAMIDOL 61 % SOLUTION: Performed by: NURSE PRACTITIONER

## 2020-08-31 PROCEDURE — 74177 CT ABD & PELVIS W/CONTRAST: CPT

## 2020-08-31 PROCEDURE — 71260 CT THORAX DX C+: CPT

## 2020-08-31 PROCEDURE — 70491 CT SOFT TISSUE NECK W/DYE: CPT

## 2020-08-31 RX ADMIN — IOPAMIDOL 100 ML: 612 INJECTION, SOLUTION INTRAVENOUS at 07:32

## 2020-09-01 DIAGNOSIS — C80.1 NEUROPATHY ASSOCIATED WITH CANCER (HCC): ICD-10-CM

## 2020-09-01 DIAGNOSIS — G63 NEUROPATHY ASSOCIATED WITH CANCER (HCC): ICD-10-CM

## 2020-09-01 RX ORDER — GABAPENTIN 300 MG/1
CAPSULE ORAL
Qty: 360 CAPSULE | Refills: 0 | Status: SHIPPED | OUTPATIENT
Start: 2020-09-01 | End: 2020-12-02 | Stop reason: DRUGHIGH

## 2020-09-01 NOTE — TELEPHONE ENCOUNTER
Caller: Prashanth Prajapati    Relationship: Self    Best call back number: [phone]    Medication needed:   Requested Prescriptions     Pending Prescriptions Disp Refills   • gabapentin (NEURONTIN) 300 MG capsule 360 capsule 1     Sig: Take 1 capsule by mouth Daily With Breakfast & Lunch AND 2 capsules Every Night.       When do you need the refill by: 9/4/20    Does the patient have less than a 3 day supply:  [] Yes  [x] No    What is the patient's preferred pharmacy: [id number] - [place], [place] - [address] [phone] [facility] [phone]

## 2020-09-02 ENCOUNTER — INFUSION (OUTPATIENT)
Dept: ONCOLOGY | Facility: HOSPITAL | Age: 70
End: 2020-09-02

## 2020-09-02 ENCOUNTER — OFFICE VISIT (OUTPATIENT)
Dept: ONCOLOGY | Facility: CLINIC | Age: 70
End: 2020-09-02

## 2020-09-02 VITALS
RESPIRATION RATE: 16 BRPM | BODY MASS INDEX: 21.71 KG/M2 | HEART RATE: 97 BPM | TEMPERATURE: 97.8 F | SYSTOLIC BLOOD PRESSURE: 178 MMHG | DIASTOLIC BLOOD PRESSURE: 77 MMHG | OXYGEN SATURATION: 97 % | HEIGHT: 62 IN | WEIGHT: 118 LBS

## 2020-09-02 DIAGNOSIS — C34.90 SCLC (SMALL CELL LUNG CARCINOMA) (HCC): ICD-10-CM

## 2020-09-02 DIAGNOSIS — C34.90 SCLC (SMALL CELL LUNG CARCINOMA) (HCC): Primary | ICD-10-CM

## 2020-09-02 LAB
ALBUMIN SERPL-MCNC: 3.6 G/DL (ref 3.5–5.2)
ALBUMIN/GLOB SERPL: 1.1 G/DL
ALP SERPL-CCNC: 88 U/L (ref 39–117)
ALT SERPL W P-5'-P-CCNC: 8 U/L (ref 1–33)
ANION GAP SERPL CALCULATED.3IONS-SCNC: 10 MMOL/L (ref 5–15)
AST SERPL-CCNC: 12 U/L (ref 1–32)
BASOPHILS # BLD AUTO: 0.06 10*3/MM3 (ref 0–0.2)
BASOPHILS NFR BLD AUTO: 0.5 % (ref 0–1.5)
BILIRUB SERPL-MCNC: 0.2 MG/DL (ref 0–1.2)
BUN SERPL-MCNC: 12 MG/DL (ref 8–23)
BUN/CREAT SERPL: 18.5 (ref 7–25)
CALCIUM SPEC-SCNC: 8.8 MG/DL (ref 8.6–10.5)
CHLORIDE SERPL-SCNC: 96 MMOL/L (ref 98–107)
CO2 SERPL-SCNC: 26 MMOL/L (ref 22–29)
CREAT SERPL-MCNC: 0.65 MG/DL (ref 0.57–1)
DEPRECATED RDW RBC AUTO: 57.6 FL (ref 37–54)
EOSINOPHIL # BLD AUTO: 0.12 10*3/MM3 (ref 0–0.4)
EOSINOPHIL NFR BLD AUTO: 1 % (ref 0.3–6.2)
ERYTHROCYTE [DISTWIDTH] IN BLOOD BY AUTOMATED COUNT: 17.9 % (ref 12.3–15.4)
GFR SERPL CREATININE-BSD FRML MDRD: 91 ML/MIN/1.73
GLOBULIN UR ELPH-MCNC: 3.2 GM/DL
GLUCOSE SERPL-MCNC: 109 MG/DL (ref 65–99)
HCT VFR BLD AUTO: 34.4 % (ref 34–46.6)
HGB BLD-MCNC: 11.2 G/DL (ref 12–15.9)
IMM GRANULOCYTES # BLD AUTO: 0.06 10*3/MM3 (ref 0–0.05)
IMM GRANULOCYTES NFR BLD AUTO: 0.5 % (ref 0–0.5)
LYMPHOCYTES # BLD AUTO: 2.36 10*3/MM3 (ref 0.7–3.1)
LYMPHOCYTES NFR BLD AUTO: 19.5 % (ref 19.6–45.3)
MCH RBC QN AUTO: 28.4 PG (ref 26.6–33)
MCHC RBC AUTO-ENTMCNC: 32.6 G/DL (ref 31.5–35.7)
MCV RBC AUTO: 87.3 FL (ref 79–97)
MONOCYTES # BLD AUTO: 1.23 10*3/MM3 (ref 0.1–0.9)
MONOCYTES NFR BLD AUTO: 10.2 % (ref 5–12)
NEUTROPHILS NFR BLD AUTO: 68.3 % (ref 42.7–76)
NEUTROPHILS NFR BLD AUTO: 8.25 10*3/MM3 (ref 1.7–7)
NRBC BLD AUTO-RTO: 0 /100 WBC (ref 0–0.2)
PLATELET # BLD AUTO: 328 10*3/MM3 (ref 140–450)
PMV BLD AUTO: 8.5 FL (ref 6–12)
POTASSIUM SERPL-SCNC: 4.3 MMOL/L (ref 3.5–5.2)
PROT SERPL-MCNC: 6.8 G/DL (ref 6–8.5)
RBC # BLD AUTO: 3.94 10*6/MM3 (ref 3.77–5.28)
SODIUM SERPL-SCNC: 132 MMOL/L (ref 136–145)
T4 FREE SERPL-MCNC: 2.05 NG/DL (ref 0.93–1.7)
TSH SERPL DL<=0.05 MIU/L-ACNC: 0.2 UIU/ML (ref 0.27–4.2)
WBC # BLD AUTO: 12.08 10*3/MM3 (ref 3.4–10.8)

## 2020-09-02 PROCEDURE — 25010000002 NIVOLUMAB 240 MG/24ML SOLUTION 24 ML VIAL: Performed by: NURSE PRACTITIONER

## 2020-09-02 PROCEDURE — 25010000003 HEPARIN LOCK FLUSH PER 10 UNITS: Performed by: INTERNAL MEDICINE

## 2020-09-02 PROCEDURE — 80053 COMPREHEN METABOLIC PANEL: CPT

## 2020-09-02 PROCEDURE — 85025 COMPLETE CBC W/AUTO DIFF WBC: CPT

## 2020-09-02 PROCEDURE — 96413 CHEMO IV INFUSION 1 HR: CPT

## 2020-09-02 PROCEDURE — 96361 HYDRATE IV INFUSION ADD-ON: CPT

## 2020-09-02 PROCEDURE — 36415 COLL VENOUS BLD VENIPUNCTURE: CPT

## 2020-09-02 PROCEDURE — 84443 ASSAY THYROID STIM HORMONE: CPT

## 2020-09-02 PROCEDURE — 99215 OFFICE O/P EST HI 40 MIN: CPT | Performed by: INTERNAL MEDICINE

## 2020-09-02 PROCEDURE — 84439 ASSAY OF FREE THYROXINE: CPT

## 2020-09-02 PROCEDURE — 96360 HYDRATION IV INFUSION INIT: CPT

## 2020-09-02 RX ORDER — OLANZAPINE 5 MG/1
TABLET ORAL
COMMUNITY
Start: 2020-08-26 | End: 2020-09-30

## 2020-09-02 RX ORDER — HEPARIN SODIUM (PORCINE) LOCK FLUSH IV SOLN 100 UNIT/ML 100 UNIT/ML
500 SOLUTION INTRAVENOUS AS NEEDED
Status: CANCELLED | OUTPATIENT
Start: 2020-09-02

## 2020-09-02 RX ORDER — SODIUM CHLORIDE 9 MG/ML
1000 INJECTION, SOLUTION INTRAVENOUS CONTINUOUS
Status: DISCONTINUED | OUTPATIENT
Start: 2020-09-02 | End: 2020-09-02 | Stop reason: HOSPADM

## 2020-09-02 RX ORDER — SODIUM CHLORIDE 0.9 % (FLUSH) 0.9 %
10 SYRINGE (ML) INJECTION AS NEEDED
Status: DISCONTINUED | OUTPATIENT
Start: 2020-09-02 | End: 2020-09-02 | Stop reason: HOSPADM

## 2020-09-02 RX ORDER — SODIUM CHLORIDE 0.9 % (FLUSH) 0.9 %
10 SYRINGE (ML) INJECTION AS NEEDED
Status: CANCELLED | OUTPATIENT
Start: 2020-09-02

## 2020-09-02 RX ORDER — SODIUM CHLORIDE 9 MG/ML
250 INJECTION, SOLUTION INTRAVENOUS ONCE
Status: DISCONTINUED | OUTPATIENT
Start: 2020-09-02 | End: 2020-09-02 | Stop reason: HOSPADM

## 2020-09-02 RX ORDER — SODIUM CHLORIDE 9 MG/ML
1000 INJECTION, SOLUTION INTRAVENOUS CONTINUOUS
Status: CANCELLED
Start: 2020-09-02

## 2020-09-02 RX ORDER — HEPARIN SODIUM (PORCINE) LOCK FLUSH IV SOLN 100 UNIT/ML 100 UNIT/ML
500 SOLUTION INTRAVENOUS AS NEEDED
Status: DISCONTINUED | OUTPATIENT
Start: 2020-09-02 | End: 2020-09-02 | Stop reason: HOSPADM

## 2020-09-02 RX ORDER — SODIUM CHLORIDE 9 MG/ML
250 INJECTION, SOLUTION INTRAVENOUS ONCE
Status: CANCELLED | OUTPATIENT
Start: 2020-09-02

## 2020-09-02 RX ADMIN — HEPARIN 500 UNITS: 100 SYRINGE at 13:52

## 2020-09-02 RX ADMIN — SODIUM CHLORIDE 480 MG: 9 INJECTION, SOLUTION INTRAVENOUS at 12:37

## 2020-09-02 RX ADMIN — SODIUM CHLORIDE 1000 ML: 9 INJECTION, SOLUTION INTRAVENOUS at 12:03

## 2020-09-02 RX ADMIN — SODIUM CHLORIDE, PRESERVATIVE FREE 10 ML: 5 INJECTION INTRAVENOUS at 13:52

## 2020-09-02 NOTE — PROGRESS NOTES
DATE OF VISIT: 9/2/2020    REASON FOR VISIT: Followup for extensive stage small cell lung cancer     HISTORY OF PRESENT ILLNESS: The patient is a very pleasant 69 y.o. female with past medical history significant for extensive stage small cell lung cancer diagnosed December 2, 2019.  She was started on palliative treatment with carboplatin and etoposide and Tecentriq December 15, 2019. Tecentriq maintenance started 03/11/2020.  The patient had progressive disease and she was switched to Opdivo plus Yervoy.  She completed 4 cycles August 13, 2020.  The patient is here today for follow-up visit with treatment cycle #1 opdivo single agent.    SUBJECTIVE: Filippo is here today by herself.  She is feeling better.  She is complaining of nausea.  Her leg swelling has resolved.  He is anxious about the scan results.    PAST MEDICAL HISTORY/SOCIAL HISTORY/FAMILY HISTORY: Reviewed by me and unchanged from my documentation done on 09/02/20.    Review of Systems   Constitutional: Positive for fatigue. Negative for activity change, appetite change, chills, fever and unexpected weight change.   HENT: Negative for hearing loss, mouth sores, nosebleeds, sore throat and trouble swallowing.    Eyes: Negative for visual disturbance.   Respiratory: Negative for cough, chest tightness, shortness of breath and wheezing.    Cardiovascular: Negative for chest pain and palpitations.   Gastrointestinal: Positive for nausea. Negative for abdominal distention, abdominal pain, blood in stool, constipation, diarrhea, rectal pain and vomiting.   Endocrine: Negative for cold intolerance and heat intolerance.   Genitourinary: Negative for difficulty urinating, dysuria, frequency and urgency.   Musculoskeletal: Negative for arthralgias, back pain, gait problem, joint swelling and myalgias.   Skin: Negative for rash.   Neurological: Negative for dizziness, tremors, syncope, weakness, light-headedness, numbness and headaches.   Hematological: Negative  for adenopathy. Does not bruise/bleed easily.   Psychiatric/Behavioral: Negative for confusion, sleep disturbance and suicidal ideas. The patient is not nervous/anxious.          Current Outpatient Medications:   •  acetaminophen (TYLENOL) 500 MG tablet, Take 500 mg by mouth Every 6 (Six) Hours As Needed for Mild Pain ., Disp: , Rfl:   •  amLODIPine (NORVASC) 5 MG tablet, Take 1 tablet by mouth Daily., Disp: 90 tablet, Rfl: 3  •  baclofen (LIORESAL) 10 MG tablet, START- ONE TAB NIGHTLY X7 DAYS, THEN 1 TAB 2X A DAY X7 DAYS, THEN 1 TAB 3X A DAY, Disp: 270 tablet, Rfl: 1  •  Calcium Carbonate-Vitamin D (CALCIUM-CARB 600 + D) 600-125 MG-UNIT tablet, Take 500 mg by mouth 2 (Two) Times a Day., Disp: 60 each, Rfl: 5  •  diclofenac (VOLTAREN) 75 MG EC tablet, Take 1 tablet by mouth Daily As Needed (pain)., Disp: 90 tablet, Rfl: 3  •  gabapentin (NEURONTIN) 300 MG capsule, Take 1 capsule by mouth Daily With Breakfast & Lunch AND 2 capsules Every Night., Disp: 360 capsule, Rfl: 0  •  levothyroxine (SYNTHROID, LEVOTHROID) 125 MCG tablet, Take 1 tablet by mouth Daily., Disp: 90 tablet, Rfl: 3  •  lidocaine (LIDODERM) 5 %, Place 1 patch on the skin as directed by provider Daily. Remove & Discard patch within 12 hours or as directed by MD, Disp: 90 each, Rfl: 3  •  Lidocaine Viscous HCl (XYLOCAINE) 2 % solution, , Disp: , Rfl:   •  lidocaine-prilocaine (EMLA) 2.5-2.5 % cream, Apply  topically to the appropriate area as directed As Needed (45-60 minutes prior to port access.  Cover with saran/plastic wrap.)., Disp: 30 g, Rfl: 3  •  magic mouthwash oral suspension, Swish and spit (or swallow) 1-2 Teaspoonfuls (5-10ml) 4 times a day as needed, Disp: 240 mL, Rfl: 3  •  OLANZapine (zyPREXA) 5 MG tablet, , Disp: , Rfl:   •  omeprazole (priLOSEC) 40 MG capsule, Take 1 capsule by mouth Daily., Disp: 90 capsule, Rfl: 2  •  ondansetron ODT (ZOFRAN-ODT) 8 MG disintegrating tablet, Place 1 tablet on the tongue Every 8 (Eight) Hours As Needed  "for Nausea or Vomiting., Disp: 30 tablet, Rfl: 2  •  pravastatin (PRAVACHOL) 40 MG tablet, Take 1 tablet by mouth Daily., Disp: 90 tablet, Rfl: 3  •  promethazine (PHENERGAN) 25 MG tablet, Take 1 tablet by mouth Every 6 (Six) Hours As Needed for Nausea or Vomiting for up to 60 doses., Disp: 45 tablet, Rfl: 5  •  tiotropium bromide-olodaterol (STIOLTO RESPIMAT) 2.5-2.5 MCG/ACT aerosol solution inhaler, Inhale 2 puffs Daily., Disp: 1 inhaler, Rfl: 5  •  traMADol (ULTRAM) 50 MG tablet, TAKE 1 TABLET BY MOUTH THREE TIMES A DAY, Disp: 270 tablet, Rfl: 1    PHYSICAL EXAMINATION:   /77   Pulse 97   Temp 97.8 °F (36.6 °C) (Temporal)   Resp 16   Ht 157.5 cm (62.01\")   Wt 53.5 kg (118 lb)   SpO2 97%   BMI 21.58 kg/m²    ECOG Performance Status: 1 - Symptomatic but completely ambulatory  General Appearance:  alert, cooperative, no apparent distress, appears stated age and normal weight   Neurologic/Psychiatric: A&O x 3, gait steady, appropriate affect, strength 5/5 in all muscle groups   HEENT:  Normocephalic, without obvious abnormality, mucous membranes moist   Neck: Supple, symmetrical, trachea midline, no adenopathy;  No thyromegaly, masses, or tenderness   Lungs:   Clear to auscultation bilaterally; respirations regular, even, and unlabored bilaterally   Heart:  Regular rate and rhythm, no murmurs appreciated   Abdomen:   Soft, non-tender, non-distended and no organomegaly   Lymph nodes: No cervical, supraclavicular, inguinal or axillary adenopathy noted.  Submandibular nodule noted approximately 2cm   Extremities: Normal, atraumatic; no clubbing, cyanosis, or edema    Skin: No rashes, ulcers, or suspicious lesions noted     No visits with results within 2 Week(s) from this visit.   Latest known visit with results is:   Lab on 08/17/2020   Component Date Value Ref Range Status   • C-Reactive Protein 08/17/2020 0.45  0.00 - 0.50 mg/dL Final   • Sed Rate 08/17/2020 15  0 - 20 mm/hr Final      Xr Ankle 3+ View " Left    Result Date: 8/13/2020  Narrative: PROCEDURE: XR ANKLE 3+ VW LEFT-  History: Left ankle swelling; M25.472-Effusion, left ankle  COMPARISON: None.  FINDINGS:  A 3 view exam demonstrates no acute fracture or dislocation. The joint spaces are preserved. Enthesophytes are present on the calcaneus. Diffuse soft tissue swelling is present within the ankle.      Impression: No acute fracture.     This report was finalized on 8/13/2020 2:30 PM by Pamela Oliveros M.D..    Ct Soft Tissue Neck With Contrast    Result Date: 9/1/2020  Narrative: PROCEDURE: CT SOFT TISSUE NECK W CONTRAST-  HISTORY: Follow up Right upper lobe extensive stage small cell lung cancer; C34.90-Malignant neoplasm of unspecified part of unspecified bronchus or lung; R59.0-Localized enlarged lymph nodes  TECHNIQUE: Axial images were obtained from the skull base through the upper chest following the administration of Isovue 300. Coronal images were reformatted.  COMPARISON: 06/01/2020.  FINDINGS: There is no evidence of a mucosal lesion within the nasopharynx, oropharynx or hypopharynx. The trachea is midline. The thyroid gland is unremarkable. Again noted are enlarged right level two lymph nodes. For example an enlarged lymph node on image 53 measures 1.6 x 0.8 cm and has decreased slightly in size compared to the prior exam. A larger level two lymph node on image 58 measures 1.9 x 1.9 cm and is unchanged. No new or enlarging lymph nodes are seen within the neck. The right submandibular gland is asymmetrically enlarged, however no discrete lesion is seen within the submandibular gland in the appearance is essentially unchanged. The visualized lung apices are clear. Bone windows reveal no lytic or destructive lesions.       Impression: Enlarged right level two lymph nodes which are unchanged to smaller in size compared to the prior exam. The lymph nodes appear somewhat necrotic and comparison is well which may indicate a treatment related  change.    This study was performed with techniques to keep radiation doses as low as reasonably achievable (ALARA). Individualized dose reduction techniques using automated exposure control or adjustment of mA and/or kV according to the patient size were employed.  This report was finalized on 9/1/2020 2:46 PM by Pamela Oliveros M.D..    Ct Chest With Contrast    Result Date: 8/31/2020  Narrative: PROCEDURE: CT ABDOMEN PELVIS W CONTRAST-, CT CHEST W CONTRAST-  HISTORY: Follow up Right upper lobe extensive stage small cell lung cancer; C34.90-Malignant neoplasm of unspecified part of unspecified bronchus or lung; R59.0-Localized enlarged lymph nodes  COMPARISON: 06/01/2020.  PROCEDURE: Axial images were obtained from the thoracic inlet through the pubic symphysis following the administration of Isovue 300 contrast.   FINDINGS:  CHEST: Left paratracheal and AP window lymph nodes are unchanged. The largest of these is an approximately 9 mm left paratracheal lymph node. A previously described precarinal lymph node measures 10 mm on today's exam and is unchanged. No new or enlarging lymph nodes are seen within the chest. Heart size is normal. There are no pleural or pericardial effusions. Lung windows redemonstrate an approximately 10 x 12 mm spiculated nodule in the medial right upper lobe on image 18 which is unchanged. There is also a spiculated area in the periphery of the right upper lobe on image 13 measuring 7 mm felt to reflect an area of scarring. No new or enlarging pulmonary nodules are identified. Note is made of calcified granulomas within the lingula. Bone windows reveal no lytic or destructive lesions.  ABDOMEN: The liver is homogeneous in appearance with no focal lesions. The spleen is unremarkable. There is a right adrenal mass which measures 4.6 x 2.3 cm which has decreased slightly in size compared to the prior exam. The left adrenal gland is normal.  The pancreas is normal. The kidneys are  unremarkable. The aorta is normal in caliber. There is a single enlarged right periaortic lymph node measuring 10 mm on image 42 which is new compared to the prior exam. No other enlarged lymph nodes are seen within the abdomen. The abdominal portions of the GI tract are unremarkable.  PELVIS: The appendix is not identified. The urinary bladder is unremarkable. There is no significant free fluid or adenopathy. Bone windows reveal no lytic or destructive lesions.      Impression: 1. Stable spiculated nodule in the medial right upper lobe and borderline enlarged mediastinal adenopathy. 2. Slight interval decrease in size in a right adrenal mass. 3. Single enlarged retroperitoneal lymph node in the abdomen measuring 10 mm which is nonspecific.  This study was performed with techniques to keep radiation doses as low as reasonably achievable (ALARA). Individualized dose reduction techniques using automated exposure control or adjustment of mA and/or kV according to the patient size were employed.  This report was finalized on 8/31/2020 3:55 PM by Pamela Oliveros M.D..    Ct Abdomen Pelvis With Contrast    Result Date: 8/31/2020  Narrative: PROCEDURE: CT ABDOMEN PELVIS W CONTRAST-, CT CHEST W CONTRAST-  HISTORY: Follow up Right upper lobe extensive stage small cell lung cancer; C34.90-Malignant neoplasm of unspecified part of unspecified bronchus or lung; R59.0-Localized enlarged lymph nodes  COMPARISON: 06/01/2020.  PROCEDURE: Axial images were obtained from the thoracic inlet through the pubic symphysis following the administration of Isovue 300 contrast.   FINDINGS:  CHEST: Left paratracheal and AP window lymph nodes are unchanged. The largest of these is an approximately 9 mm left paratracheal lymph node. A previously described precarinal lymph node measures 10 mm on today's exam and is unchanged. No new or enlarging lymph nodes are seen within the chest. Heart size is normal. There are no pleural or pericardial  effusions. Lung windows redemonstrate an approximately 10 x 12 mm spiculated nodule in the medial right upper lobe on image 18 which is unchanged. There is also a spiculated area in the periphery of the right upper lobe on image 13 measuring 7 mm felt to reflect an area of scarring. No new or enlarging pulmonary nodules are identified. Note is made of calcified granulomas within the lingula. Bone windows reveal no lytic or destructive lesions.  ABDOMEN: The liver is homogeneous in appearance with no focal lesions. The spleen is unremarkable. There is a right adrenal mass which measures 4.6 x 2.3 cm which has decreased slightly in size compared to the prior exam. The left adrenal gland is normal.  The pancreas is normal. The kidneys are unremarkable. The aorta is normal in caliber. There is a single enlarged right periaortic lymph node measuring 10 mm on image 42 which is new compared to the prior exam. No other enlarged lymph nodes are seen within the abdomen. The abdominal portions of the GI tract are unremarkable.  PELVIS: The appendix is not identified. The urinary bladder is unremarkable. There is no significant free fluid or adenopathy. Bone windows reveal no lytic or destructive lesions.      Impression: 1. Stable spiculated nodule in the medial right upper lobe and borderline enlarged mediastinal adenopathy. 2. Slight interval decrease in size in a right adrenal mass. 3. Single enlarged retroperitoneal lymph node in the abdomen measuring 10 mm which is nonspecific.  This study was performed with techniques to keep radiation doses as low as reasonably achievable (ALARA). Individualized dose reduction techniques using automated exposure control or adjustment of mA and/or kV according to the patient size were employed.  This report was finalized on 8/31/2020 3:55 PM by Pamela Oliveros M.D..    Us Venous Doppler Lower Extremity Left (duplex)    Result Date: 8/17/2020  Narrative: FINAL REPORT TECHNIQUE:  Ultrasound images of the deep venous system were obtained from the left groin to the calf veins. CLINICAL HISTORY: LLE EDEMA. FINDINGS: The deep venous system is normally compressible.  Normal flow is identified.     Impression: No evidence of left lower extremity DVT. Authenticated by Diego Castellanos MD on 08/17/2020 05:19:58 PM  (    ASSESSMENT: The patient is a very pleasant 69 y.o. female  with right upper lobe extensive stage small cell lung cancer     PROBLEM LIST:   1.  Right upper lobe extensive stage small cell lung cancer, [id number] stage IVb:  A.  Presented with right lower ribs pain  B.  CT chest revealed 3.5 cm right upper lobe lung mass, right adrenal nodule, mediastinal adenopathy, and left cervical lymphadenopathy.  C.  Status post left cervical lymph node biopsy done by Dr. Owen December 2, 2019  D.  Pathology consistent with small cell carcinoma  E.  Started palliative treatment with carboplatin and etoposide and Tecentriq December 15, 2019, status post 8 cycles  F.  Progressive disease documented CT scans done on June 1, 2020 with a new cervical lymphadenopathy and relapsed right adrenal mass  G.  Started Opdivo plus Yervoy Brenda 10, 2020, status post 4 cycle  2.  COPD  3.  Hypertension  4.  Hypercholesterolemia  5.  Hypothyroid  6.  GERD  7.  Progressive nausea and vomiting    PLAN:  1. We will proceed with Opdivo cycle #1, this is in compliance with NCCN guidelines.  2. The patient will follow up with us in 4 weeks maintenance cycle #2 opdivo.  3.  I did go over the scan results with the patient.  I reassured her no evidence for new or progressive disease with partial response currently.  We will do 3 months follow-up study which will be due December 2020.   4.  I will continue to monitor the patient blood work including blood counts kidney function liver function electrolytes function.  5.  I will continue the patient on Zofran ODT as needed as well as Zyprexa 5 mg daily as for  chemotherapy-induced nausea.    6.  We reviewed potential side effects of immunotherapy including but not limited to immune mediated reactions with thyroiditis, pneumonitis, hepatitis, colitis, rash, and electrolytes abnormalities, fatigue, multiorgan failure, and possibly death.  7.  I will continue to monitor patient blood pressure will consider adjusting her antihypertensive agents if needed while she is on active cancer treatment.  8.  We will continue Synthroid daily for hypothyroid.  Patient is at risk for thyroiditis while she is on immunotherapy.  9. We will continue omeprazole for GERD.   10.  We will do follow-up study MRI down the road on as-needed basis.  11.  I will continue IV fluids only of normal saline weekly given her dehydration.  We can do it more often if needed.    Rupesh Cao MD  9/2/2020

## 2020-09-02 NOTE — TELEPHONE ENCOUNTER
Spoke with pt and discussed need for appt in 3 months. Pt had cancer treatment today and is very tired right now, she will call back to schedule an appt.

## 2020-09-12 DIAGNOSIS — E03.9 ACQUIRED HYPOTHYROIDISM: ICD-10-CM

## 2020-09-14 RX ORDER — LEVOTHYROXINE SODIUM 112 UG/1
112 TABLET ORAL DAILY
Qty: 90 TABLET | Refills: 3 | Status: SHIPPED | OUTPATIENT
Start: 2020-09-14 | End: 2020-09-30

## 2020-09-14 RX ORDER — TIOTROPIUM BROMIDE AND OLODATEROL 3.124; 2.736 UG/1; UG/1
SPRAY, METERED RESPIRATORY (INHALATION)
Qty: 1 INHALER | Refills: 1 | Status: SHIPPED | OUTPATIENT
Start: 2020-09-14 | End: 2020-12-18

## 2020-09-14 NOTE — TELEPHONE ENCOUNTER
Lab Results   Component Value Date    TSH 0.197 (L) 09/02/2020     Lab Results   Component Value Date    FREET4 2.05 (H) 09/02/2020        Based on labs, I've adjusted her down to 112 mcg from 125 mcg. Refill sent.

## 2020-09-17 ENCOUNTER — INFUSION (OUTPATIENT)
Dept: ONCOLOGY | Facility: HOSPITAL | Age: 70
End: 2020-09-17

## 2020-09-17 ENCOUNTER — OFFICE VISIT (OUTPATIENT)
Dept: PULMONOLOGY | Facility: CLINIC | Age: 70
End: 2020-09-17

## 2020-09-17 VITALS
DIASTOLIC BLOOD PRESSURE: 81 MMHG | TEMPERATURE: 97.8 F | BODY MASS INDEX: 20.84 KG/M2 | RESPIRATION RATE: 14 BRPM | HEART RATE: 116 BPM | WEIGHT: 114 LBS | SYSTOLIC BLOOD PRESSURE: 141 MMHG

## 2020-09-17 VITALS
HEART RATE: 111 BPM | BODY MASS INDEX: 21.53 KG/M2 | OXYGEN SATURATION: 99 % | TEMPERATURE: 97.3 F | HEIGHT: 62 IN | WEIGHT: 117 LBS | DIASTOLIC BLOOD PRESSURE: 76 MMHG | SYSTOLIC BLOOD PRESSURE: 132 MMHG | RESPIRATION RATE: 16 BRPM

## 2020-09-17 DIAGNOSIS — J43.2 CENTRILOBULAR EMPHYSEMA (HCC): ICD-10-CM

## 2020-09-17 DIAGNOSIS — C34.90 SMALL CELL LUNG CANCER IN ADULT (HCC): ICD-10-CM

## 2020-09-17 DIAGNOSIS — J44.9 CHRONIC OBSTRUCTIVE PULMONARY DISEASE, UNSPECIFIED COPD TYPE (HCC): ICD-10-CM

## 2020-09-17 DIAGNOSIS — R06.02 SOB (SHORTNESS OF BREATH): Primary | ICD-10-CM

## 2020-09-17 DIAGNOSIS — Z14.8 ALPHA-1-ANTITRYPSIN DEFICIENCY CARRIER: ICD-10-CM

## 2020-09-17 DIAGNOSIS — F17.200 SMOKING: ICD-10-CM

## 2020-09-17 DIAGNOSIS — R06.02 SOB (SHORTNESS OF BREATH): ICD-10-CM

## 2020-09-17 DIAGNOSIS — C34.90 SCLC (SMALL CELL LUNG CARCINOMA) (HCC): Primary | ICD-10-CM

## 2020-09-17 PROCEDURE — 99214 OFFICE O/P EST MOD 30 MIN: CPT | Performed by: INTERNAL MEDICINE

## 2020-09-17 PROCEDURE — 94726 PLETHYSMOGRAPHY LUNG VOLUMES: CPT | Performed by: INTERNAL MEDICINE

## 2020-09-17 PROCEDURE — G0009 ADMIN PNEUMOCOCCAL VACCINE: HCPCS | Performed by: INTERNAL MEDICINE

## 2020-09-17 PROCEDURE — 96360 HYDRATION IV INFUSION INIT: CPT

## 2020-09-17 PROCEDURE — 96361 HYDRATE IV INFUSION ADD-ON: CPT

## 2020-09-17 PROCEDURE — 25010000003 HEPARIN LOCK FLUSH PER 10 UNITS: Performed by: INTERNAL MEDICINE

## 2020-09-17 PROCEDURE — 94729 DIFFUSING CAPACITY: CPT | Performed by: INTERNAL MEDICINE

## 2020-09-17 PROCEDURE — 94060 EVALUATION OF WHEEZING: CPT | Performed by: INTERNAL MEDICINE

## 2020-09-17 PROCEDURE — 90732 PPSV23 VACC 2 YRS+ SUBQ/IM: CPT | Performed by: INTERNAL MEDICINE

## 2020-09-17 RX ORDER — HEPARIN SODIUM (PORCINE) LOCK FLUSH IV SOLN 100 UNIT/ML 100 UNIT/ML
500 SOLUTION INTRAVENOUS AS NEEDED
Status: DISCONTINUED | OUTPATIENT
Start: 2020-09-17 | End: 2020-09-17 | Stop reason: HOSPADM

## 2020-09-17 RX ORDER — IPRATROPIUM BROMIDE AND ALBUTEROL SULFATE 2.5; .5 MG/3ML; MG/3ML
3 SOLUTION RESPIRATORY (INHALATION) 4 TIMES DAILY PRN
Qty: 120 ML | Refills: 5 | Status: SHIPPED | OUTPATIENT
Start: 2020-09-17

## 2020-09-17 RX ORDER — SODIUM CHLORIDE 0.9 % (FLUSH) 0.9 %
10 SYRINGE (ML) INJECTION AS NEEDED
Status: CANCELLED | OUTPATIENT
Start: 2020-09-17

## 2020-09-17 RX ORDER — SOFT LENS DISINFECTANT
1 SOLUTION, NON-ORAL MISCELLANEOUS TAKE AS DIRECTED
Qty: 1 EACH | Refills: 0 | Status: SHIPPED | OUTPATIENT
Start: 2020-09-17

## 2020-09-17 RX ORDER — HEPARIN SODIUM (PORCINE) LOCK FLUSH IV SOLN 100 UNIT/ML 100 UNIT/ML
500 SOLUTION INTRAVENOUS AS NEEDED
Status: CANCELLED | OUTPATIENT
Start: 2020-09-17

## 2020-09-17 RX ORDER — SODIUM CHLORIDE 0.9 % (FLUSH) 0.9 %
10 SYRINGE (ML) INJECTION AS NEEDED
Status: DISCONTINUED | OUTPATIENT
Start: 2020-09-17 | End: 2020-09-17 | Stop reason: HOSPADM

## 2020-09-17 RX ORDER — ALBUTEROL SULFATE 90 UG/1
2 AEROSOL, METERED RESPIRATORY (INHALATION) EVERY 4 HOURS PRN
Qty: 18 G | Refills: 5 | Status: SHIPPED | OUTPATIENT
Start: 2020-09-17

## 2020-09-17 RX ADMIN — SODIUM CHLORIDE 1000 ML: 9 INJECTION, SOLUTION INTRAVENOUS at 11:09

## 2020-09-17 RX ADMIN — SODIUM CHLORIDE, PRESERVATIVE FREE 10 ML: 5 INJECTION INTRAVENOUS at 12:41

## 2020-09-17 RX ADMIN — HEPARIN 500 UNITS: 100 SYRINGE at 12:41

## 2020-09-17 NOTE — PROGRESS NOTES
"Chief Complaint   Patient presents with   • Follow-up   • Shortness of Breath       Subjective   Prashanth Prajapati is a 69 y.o. female.     History of Present Illness   Patient was evaluated today for follow up of shortness of breath, SCLC and COPD.     Patient says that her symptoms have been stable since the last clinic visit. she reports no recent exacerbations. Patient is using medications, as prescribed. Exercise tolerance has also remained stable.     Continues to smoke 1/4 pack per day.    She says that her \"cancer came back\" but now it is better again since she has been on immunotherapy.     The following portions of the patient's history were reviewed and updated as appropriate: allergies, current medications, past family history, past medical history, past social history and past surgical history.    Review of Systems   Constitutional: Negative for chills and fever.   HENT: Negative for rhinorrhea, sinus pressure, sneezing and sore throat.    Respiratory: Negative for cough, chest tightness, shortness of breath and wheezing.    Psychiatric/Behavioral: Positive for sleep disturbance.       Objective   Visit Vitals  /76   Pulse 111   Temp 97.3 °F (36.3 °C)   Resp 16   Ht 157.5 cm (62.01\")   Wt 53.1 kg (117 lb)   SpO2 99%   BMI 21.39 kg/m²       Physical Exam  Vitals signs reviewed.   Constitutional:       Appearance: She is well-developed.   Eyes:      Extraocular Movements: Extraocular movements intact.      Pupils: Pupils are equal, round, and reactive to light.   Neck:      Musculoskeletal: Neck supple.   Cardiovascular:      Rate and Rhythm: Normal rate and regular rhythm.      Comments: Left sided port in place.   Pulmonary:      Effort: Pulmonary effort is normal. No respiratory distress.      Comments: Somewhat hyperresonant to percussion.  Somewhat decreased air entry.  Mild scattered wheezing noted.   Skin:     General: Skin is warm and dry.   Neurological:      Mental Status: She is alert " and oriented to person, place, and time.         Assessment/Plan   Prashanth was seen today for follow-up and shortness of breath.    Diagnoses and all orders for this visit:    SOB (shortness of breath)  -     Overnight Sleep Oximetry Study; Future    Chronic obstructive pulmonary disease, unspecified COPD type (CMS/HCC)  -     Overnight Sleep Oximetry Study; Future    Centrilobular emphysema (CMS/HCC)  -     Overnight Sleep Oximetry Study; Future    Small cell lung cancer in adult (CMS/HCC)    Smoking    Alpha-1-antitrypsin deficiency carrier    Other orders  -     Pneumococcal Polysaccharide Vaccine 23-Valent Greater Than or Equal To 3yo Subcutaneous / IM  -     Nebulizer device; 1 Device Take As Directed.  -     ipratropium-albuterol (DUO-NEB) 0.5-2.5 mg/3 ml nebulizer; Take 3 mL by nebulization 4 (Four) Times a Day As Needed for Wheezing or Shortness of Air.  -     albuterol sulfate HFA (Ventolin HFA) 108 (90 Base) MCG/ACT inhaler; Inhale 2 puffs Every 4 (Four) Hours As Needed for Wheezing or Shortness of Air.           Return in about 6 months (around 3/17/2021) for Recheck, For Natasha, ....Also 12-13 mths w/ Dr. Olvera.    DISCUSSION (if any):  Last CT scan was reviewed in great detail with the patient. Images reviewed personally.   Results for orders placed during the hospital encounter of 08/31/20   CT Chest With Contrast    Narrative PROCEDURE: CT ABDOMEN PELVIS W CONTRAST-, CT CHEST W CONTRAST-     HISTORY: Follow up Right upper lobe extensive stage small cell lung  cancer; C34.90-Malignant neoplasm of unspecified part of unspecified  bronchus or lung; R59.0-Localized enlarged lymph nodes     COMPARISON: 06/01/2020.     PROCEDURE: Axial images were obtained from the thoracic inlet through  the pubic symphysis following the administration of Isovue 300   contrast.       FINDINGS:      CHEST: Left paratracheal and AP window lymph nodes are unchanged. The  largest of these is an approximately 9 mm left  paratracheal lymph node.  A previously described precarinal lymph node measures 10 mm on today's  exam and is unchanged. No new or enlarging lymph nodes are seen within  the chest. Heart size is normal. There are no pleural or pericardial  effusions. Lung windows redemonstrate an approximately 10 x 12 mm  spiculated nodule in the medial right upper lobe on image 18 which is  unchanged. There is also a spiculated area in the periphery of the right  upper lobe on image 13 measuring 7 mm felt to reflect an area of  scarring. No new or enlarging pulmonary nodules are identified. Note is  made of calcified granulomas within the lingula. Bone windows reveal no  lytic or destructive lesions.     ABDOMEN: The liver is homogeneous in appearance with no focal lesions.  The spleen is unremarkable. There is a right adrenal mass which measures  4.6 x 2.3 cm which has decreased slightly in size compared to the prior  exam. The left adrenal gland is normal.  The pancreas is normal. The  kidneys are unremarkable. The aorta is normal in caliber. There is a  single enlarged right periaortic lymph node measuring 10 mm on image 42  which is new compared to the prior exam. No other enlarged lymph nodes  are seen within the abdomen. The abdominal portions of the GI tract are  unremarkable.     PELVIS: The appendix is not identified. The urinary bladder is  unremarkable. There is no significant free fluid or adenopathy. Bone  windows reveal no lytic or destructive lesions.       Impression 1. Stable spiculated nodule in the medial right upper lobe and  borderline enlarged mediastinal adenopathy.  2. Slight interval decrease in size in a right adrenal mass.  3. Single enlarged retroperitoneal lymph node in the abdomen measuring  10 mm which is nonspecific.     This study was performed with techniques to keep radiation doses as low  as reasonably achievable (ALARA). Individualized dose reduction  techniques using automated exposure control  or adjustment of mA and/or  kV according to the patient size were employed.      This report was finalized on 8/31/2020 3:55 PM by Pamela Oliveros M.D..       Lab Results   Component Value Date    AFPTM 147 03/11/2020     Lab Results   Component Value Date    PHENOTYPE MS 03/11/2020     She has told her family about alpha 1 carrier state.     Today's PFTs showed moderate obstruction. This is worse compared to last year.     Due to symptoms which are suggestive of poorly controlled obstructive lung disease, I have advised her to use medications regularly and decided to add DuoNeb twice a day for the next 2 weeks or so.    Other medications will remain the same    Compliance with medications stressed.     Side effects of prescribed medications were discussed with the patient    Smoking cessation was advised.    Continue follow up with Dr. Cao.     Will check overnight pulse oximetry.    The patient says that she is up-to-date with prevnar vaccination.     After discussion about the need for pneumococcal-23 vaccination, it was administered today.    She will not need anymore pneumococcal vaccines.       Dictated utilizing Dragon dictation.    This document was electronically signed by Stefany Olvera MD on 09/17/20 at 14:33 EDT

## 2020-09-17 NOTE — PROGRESS NOTES
"Patient called requesting IV fluids d/t feeling \"sluggish\" and not drinking well. Will get her scheduled to come in today at 1100 per RC  "

## 2020-09-21 ENCOUNTER — TELEPHONE (OUTPATIENT)
Dept: INTERNAL MEDICINE | Facility: CLINIC | Age: 70
End: 2020-09-21

## 2020-09-21 NOTE — TELEPHONE ENCOUNTER
PATIENT STATES THE levothyroxine (Synthroid) 112 MCG tablet IS A LOWER DOSE OF WHAT SHE NORMALLY TAKES   PATIENT IS CONFUSED ABOUT THIS   PLEASE CALL  BACK [phone]

## 2020-09-24 RX ORDER — ONDANSETRON 8 MG/1
8 TABLET, ORALLY DISINTEGRATING ORAL EVERY 8 HOURS PRN
Qty: 30 TABLET | Refills: 4 | Status: SHIPPED | OUTPATIENT
Start: 2020-09-24

## 2020-09-30 ENCOUNTER — INFUSION (OUTPATIENT)
Dept: ONCOLOGY | Facility: HOSPITAL | Age: 70
End: 2020-09-30

## 2020-09-30 ENCOUNTER — OFFICE VISIT (OUTPATIENT)
Dept: ONCOLOGY | Facility: CLINIC | Age: 70
End: 2020-09-30

## 2020-09-30 VITALS
RESPIRATION RATE: 16 BRPM | DIASTOLIC BLOOD PRESSURE: 73 MMHG | WEIGHT: 109 LBS | HEART RATE: 119 BPM | OXYGEN SATURATION: 97 % | TEMPERATURE: 97.7 F | BODY MASS INDEX: 20.06 KG/M2 | HEIGHT: 62 IN | SYSTOLIC BLOOD PRESSURE: 128 MMHG

## 2020-09-30 DIAGNOSIS — C34.90 SCLC (SMALL CELL LUNG CARCINOMA) (HCC): Primary | ICD-10-CM

## 2020-09-30 LAB
ALBUMIN SERPL-MCNC: 3.4 G/DL (ref 3.5–5.2)
ALBUMIN/GLOB SERPL: 1.1 G/DL
ALP SERPL-CCNC: 95 U/L (ref 39–117)
ALT SERPL W P-5'-P-CCNC: 7 U/L (ref 1–33)
ANION GAP SERPL CALCULATED.3IONS-SCNC: 10 MMOL/L (ref 5–15)
AST SERPL-CCNC: 11 U/L (ref 1–32)
BASOPHILS # BLD AUTO: 0.04 10*3/MM3 (ref 0–0.2)
BASOPHILS NFR BLD AUTO: 0.3 % (ref 0–1.5)
BILIRUB SERPL-MCNC: 0.2 MG/DL (ref 0–1.2)
BUN SERPL-MCNC: 16 MG/DL (ref 8–23)
BUN/CREAT SERPL: 20.8 (ref 7–25)
CALCIUM SPEC-SCNC: 8.7 MG/DL (ref 8.6–10.5)
CHLORIDE SERPL-SCNC: 93 MMOL/L (ref 98–107)
CO2 SERPL-SCNC: 24 MMOL/L (ref 22–29)
CREAT SERPL-MCNC: 0.77 MG/DL (ref 0.57–1)
DEPRECATED RDW RBC AUTO: 51 FL (ref 37–54)
EOSINOPHIL # BLD AUTO: 0.01 10*3/MM3 (ref 0–0.4)
EOSINOPHIL NFR BLD AUTO: 0.1 % (ref 0.3–6.2)
ERYTHROCYTE [DISTWIDTH] IN BLOOD BY AUTOMATED COUNT: 15.7 % (ref 12.3–15.4)
GFR SERPL CREATININE-BSD FRML MDRD: 75 ML/MIN/1.73
GLOBULIN UR ELPH-MCNC: 3.2 GM/DL
GLUCOSE SERPL-MCNC: 114 MG/DL (ref 65–99)
HCT VFR BLD AUTO: 37.1 % (ref 34–46.6)
HGB BLD-MCNC: 12.3 G/DL (ref 12–15.9)
IMM GRANULOCYTES # BLD AUTO: 0.06 10*3/MM3 (ref 0–0.05)
IMM GRANULOCYTES NFR BLD AUTO: 0.5 % (ref 0–0.5)
LYMPHOCYTES # BLD AUTO: 2.1 10*3/MM3 (ref 0.7–3.1)
LYMPHOCYTES NFR BLD AUTO: 17.8 % (ref 19.6–45.3)
MCH RBC QN AUTO: 29.4 PG (ref 26.6–33)
MCHC RBC AUTO-ENTMCNC: 33.2 G/DL (ref 31.5–35.7)
MCV RBC AUTO: 88.8 FL (ref 79–97)
MONOCYTES # BLD AUTO: 1.01 10*3/MM3 (ref 0.1–0.9)
MONOCYTES NFR BLD AUTO: 8.6 % (ref 5–12)
NEUTROPHILS NFR BLD AUTO: 72.7 % (ref 42.7–76)
NEUTROPHILS NFR BLD AUTO: 8.55 10*3/MM3 (ref 1.7–7)
NRBC BLD AUTO-RTO: 0 /100 WBC (ref 0–0.2)
PLATELET # BLD AUTO: 284 10*3/MM3 (ref 140–450)
PMV BLD AUTO: 8.7 FL (ref 6–12)
POTASSIUM SERPL-SCNC: 4.1 MMOL/L (ref 3.5–5.2)
PROT SERPL-MCNC: 6.6 G/DL (ref 6–8.5)
RBC # BLD AUTO: 4.18 10*6/MM3 (ref 3.77–5.28)
SODIUM SERPL-SCNC: 127 MMOL/L (ref 136–145)
T4 FREE SERPL-MCNC: 2.05 NG/DL (ref 0.93–1.7)
TSH SERPL DL<=0.05 MIU/L-ACNC: 0.16 UIU/ML (ref 0.27–4.2)
WBC # BLD AUTO: 11.77 10*3/MM3 (ref 3.4–10.8)

## 2020-09-30 PROCEDURE — 25010000003 HEPARIN LOCK FLUSH PER 10 UNITS: Performed by: INTERNAL MEDICINE

## 2020-09-30 PROCEDURE — 25010000002 ONDANSETRON PER 1 MG: Performed by: NURSE PRACTITIONER

## 2020-09-30 PROCEDURE — 99214 OFFICE O/P EST MOD 30 MIN: CPT | Performed by: NURSE PRACTITIONER

## 2020-09-30 PROCEDURE — 96360 HYDRATION IV INFUSION INIT: CPT

## 2020-09-30 PROCEDURE — 96375 TX/PRO/DX INJ NEW DRUG ADDON: CPT

## 2020-09-30 PROCEDURE — 84443 ASSAY THYROID STIM HORMONE: CPT

## 2020-09-30 PROCEDURE — 36415 COLL VENOUS BLD VENIPUNCTURE: CPT

## 2020-09-30 PROCEDURE — 85025 COMPLETE CBC W/AUTO DIFF WBC: CPT

## 2020-09-30 PROCEDURE — 96413 CHEMO IV INFUSION 1 HR: CPT

## 2020-09-30 PROCEDURE — 80053 COMPREHEN METABOLIC PANEL: CPT

## 2020-09-30 PROCEDURE — 96361 HYDRATE IV INFUSION ADD-ON: CPT

## 2020-09-30 PROCEDURE — 84439 ASSAY OF FREE THYROXINE: CPT

## 2020-09-30 PROCEDURE — 25010000002 NIVOLUMAB 240 MG/24ML SOLUTION 24 ML VIAL: Performed by: NURSE PRACTITIONER

## 2020-09-30 RX ORDER — SODIUM CHLORIDE 9 MG/ML
250 INJECTION, SOLUTION INTRAVENOUS ONCE
Status: DISCONTINUED | OUTPATIENT
Start: 2020-09-30 | End: 2020-09-30 | Stop reason: HOSPADM

## 2020-09-30 RX ORDER — ONDANSETRON 2 MG/ML
8 INJECTION INTRAMUSCULAR; INTRAVENOUS ONCE
Status: CANCELLED
Start: 2020-10-27

## 2020-09-30 RX ORDER — HEPARIN SODIUM (PORCINE) LOCK FLUSH IV SOLN 100 UNIT/ML 100 UNIT/ML
500 SOLUTION INTRAVENOUS AS NEEDED
Status: CANCELLED | OUTPATIENT
Start: 2020-09-30

## 2020-09-30 RX ORDER — HEPARIN SODIUM (PORCINE) LOCK FLUSH IV SOLN 100 UNIT/ML 100 UNIT/ML
500 SOLUTION INTRAVENOUS AS NEEDED
Status: DISCONTINUED | OUTPATIENT
Start: 2020-09-30 | End: 2020-09-30 | Stop reason: HOSPADM

## 2020-09-30 RX ORDER — SODIUM CHLORIDE 0.9 % (FLUSH) 0.9 %
10 SYRINGE (ML) INJECTION AS NEEDED
Status: CANCELLED | OUTPATIENT
Start: 2020-09-30

## 2020-09-30 RX ORDER — SODIUM CHLORIDE 0.9 % (FLUSH) 0.9 %
10 SYRINGE (ML) INJECTION AS NEEDED
Status: DISCONTINUED | OUTPATIENT
Start: 2020-09-30 | End: 2020-09-30 | Stop reason: HOSPADM

## 2020-09-30 RX ORDER — ONDANSETRON 2 MG/ML
8 INJECTION INTRAMUSCULAR; INTRAVENOUS ONCE
Status: COMPLETED | OUTPATIENT
Start: 2020-09-30 | End: 2020-09-30

## 2020-09-30 RX ORDER — ONDANSETRON 2 MG/ML
8 INJECTION INTRAMUSCULAR; INTRAVENOUS ONCE
Status: CANCELLED
Start: 2020-09-30

## 2020-09-30 RX ORDER — ONDANSETRON 2 MG/ML
8 INJECTION INTRAMUSCULAR; INTRAVENOUS ONCE
Status: CANCELLED
Start: 2020-12-31

## 2020-09-30 RX ORDER — SODIUM CHLORIDE 9 MG/ML
250 INJECTION, SOLUTION INTRAVENOUS ONCE
Status: CANCELLED | OUTPATIENT
Start: 2020-09-30

## 2020-09-30 RX ORDER — ONDANSETRON 2 MG/ML
8 INJECTION INTRAMUSCULAR; INTRAVENOUS ONCE
Status: CANCELLED
Start: 2020-10-06

## 2020-09-30 RX ORDER — LEVOTHYROXINE SODIUM 0.07 MG/1
75 TABLET ORAL DAILY
Qty: 30 TABLET | Refills: 3 | Status: SHIPPED | OUTPATIENT
Start: 2020-09-30 | End: 2020-12-08

## 2020-09-30 RX ORDER — SODIUM CHLORIDE 9 MG/ML
1000 INJECTION, SOLUTION INTRAVENOUS CONTINUOUS
Status: CANCELLED
Start: 2020-09-30

## 2020-09-30 RX ORDER — LORAZEPAM 1 MG/1
1 TABLET ORAL EVERY 8 HOURS PRN
Qty: 90 TABLET | Refills: 2 | Status: SHIPPED | OUTPATIENT
Start: 2020-09-30

## 2020-09-30 RX ORDER — ONDANSETRON 2 MG/ML
8 INJECTION INTRAMUSCULAR; INTRAVENOUS ONCE
Status: CANCELLED
Start: 2020-10-20

## 2020-09-30 RX ORDER — ONDANSETRON 2 MG/ML
8 INJECTION INTRAMUSCULAR; INTRAVENOUS ONCE
Status: CANCELLED
Start: 2021-01-18

## 2020-09-30 RX ORDER — SODIUM CHLORIDE 9 MG/ML
1000 INJECTION, SOLUTION INTRAVENOUS CONTINUOUS
Status: DISCONTINUED | OUTPATIENT
Start: 2020-09-30 | End: 2020-09-30 | Stop reason: HOSPADM

## 2020-09-30 RX ORDER — DOCUSATE SODIUM 100 MG/1
CAPSULE, LIQUID FILLED ORAL
Qty: 60 CAPSULE | Refills: 3 | Status: SHIPPED | OUTPATIENT
Start: 2020-09-30

## 2020-09-30 RX ORDER — ONDANSETRON 2 MG/ML
8 INJECTION INTRAMUSCULAR; INTRAVENOUS ONCE
Status: CANCELLED
Start: 2020-10-09

## 2020-09-30 RX ADMIN — SODIUM CHLORIDE 480 MG: 900 INJECTION, SOLUTION INTRAVENOUS at 14:39

## 2020-09-30 RX ADMIN — SODIUM CHLORIDE, PRESERVATIVE FREE 10 ML: 5 INJECTION INTRAVENOUS at 15:41

## 2020-09-30 RX ADMIN — ONDANSETRON 8 MG: 2 INJECTION INTRAMUSCULAR; INTRAVENOUS at 14:21

## 2020-09-30 RX ADMIN — HEPARIN 500 UNITS: 100 SYRINGE at 15:41

## 2020-09-30 RX ADMIN — SODIUM CHLORIDE 1000 ML: 9 INJECTION, SOLUTION INTRAVENOUS at 13:55

## 2020-09-30 NOTE — PROGRESS NOTES
DATE OF VISIT: 9/30/2020    REASON FOR VISIT: Followup for extensive stage small cell lung cancer     HISTORY OF PRESENT ILLNESS: The patient is a very pleasant 69 y.o. female with past medical history significant for extensive stage small cell lung cancer diagnosed December 2, 2019.  She was started on palliative treatment with carboplatin and etoposide and Tecentriq December 15, 2019. Tecentriq maintenance started 03/11/2020.  The patient had progressive disease and she was switched to Opdivo plus Yervoy.  She completed 4 cycles August 13, 2020.  The patient is here today for follow-up visit with treatment cycle #2 opdivo single agent.    SUBJECTIVE: Filippo is here today by herself.  She has had nausea off and on since treatment. She reports that it can be triggered by smells. Also, she noted that there has been times that she has awoken from sleep with vomiting. She had to get fluids and that helped.  No fevers. No headaches. No active infections. She also reports some constipation.     PAST MEDICAL HISTORY/SOCIAL HISTORY/FAMILY HISTORY: Reviewed by me and unchanged from my documentation done on 09/30/20.    Review of Systems   Constitutional: Positive for fatigue. Negative for activity change, appetite change, chills, fever and unexpected weight change.   HENT: Negative for hearing loss, mouth sores, nosebleeds, sore throat and trouble swallowing.    Eyes: Negative for visual disturbance.   Respiratory: Negative for cough, chest tightness, shortness of breath and wheezing.    Cardiovascular: Negative for chest pain and palpitations.   Gastrointestinal: Positive for constipation, nausea and vomiting. Negative for abdominal distention, abdominal pain, blood in stool, diarrhea and rectal pain.   Endocrine: Negative for cold intolerance and heat intolerance.   Genitourinary: Negative for difficulty urinating, dysuria, frequency and urgency.   Musculoskeletal: Negative for arthralgias, back pain, gait problem, joint  swelling and myalgias.   Skin: Negative for rash.   Neurological: Negative for dizziness, tremors, syncope, weakness, light-headedness, numbness and headaches.   Hematological: Negative for adenopathy. Does not bruise/bleed easily.   Psychiatric/Behavioral: Negative for confusion, sleep disturbance and suicidal ideas. The patient is not nervous/anxious.          Current Outpatient Medications:   •  acetaminophen (TYLENOL) 500 MG tablet, Take 500 mg by mouth Every 6 (Six) Hours As Needed for Mild Pain ., Disp: , Rfl:   •  albuterol sulfate HFA (Ventolin HFA) 108 (90 Base) MCG/ACT inhaler, Inhale 2 puffs Every 4 (Four) Hours As Needed for Wheezing or Shortness of Air., Disp: 18 g, Rfl: 5  •  amLODIPine (NORVASC) 5 MG tablet, Take 1 tablet by mouth Daily., Disp: 90 tablet, Rfl: 3  •  baclofen (LIORESAL) 10 MG tablet, START- ONE TAB NIGHTLY X7 DAYS, THEN 1 TAB 2X A DAY X7 DAYS, THEN 1 TAB 3X A DAY, Disp: 270 tablet, Rfl: 1  •  Calcium Carbonate-Vitamin D (CALCIUM-CARB 600 + D) 600-125 MG-UNIT tablet, Take 500 mg by mouth 2 (Two) Times a Day., Disp: 60 each, Rfl: 5  •  diclofenac (VOLTAREN) 75 MG EC tablet, Take 1 tablet by mouth Daily As Needed (pain)., Disp: 90 tablet, Rfl: 3  •  docusate sodium (COLACE) 100 MG capsule, Take 1 tablet twice daily, Disp: 60 capsule, Rfl: 3  •  gabapentin (NEURONTIN) 300 MG capsule, Take 1 capsule by mouth Daily With Breakfast & Lunch AND 2 capsules Every Night., Disp: 360 capsule, Rfl: 0  •  ipratropium-albuterol (DUO-NEB) 0.5-2.5 mg/3 ml nebulizer, Take 3 mL by nebulization 4 (Four) Times a Day As Needed for Wheezing or Shortness of Air., Disp: 120 mL, Rfl: 5  •  levothyroxine (Synthroid) 112 MCG tablet, Take 1 tablet by mouth Daily., Disp: 90 tablet, Rfl: 3  •  lidocaine (LIDODERM) 5 %, Place 1 patch on the skin as directed by provider Daily. Remove & Discard patch within 12 hours or as directed by MD, Disp: 90 each, Rfl: 3  •  Lidocaine Viscous HCl (XYLOCAINE) 2 % solution, , Disp: ,  "Rfl:   •  lidocaine-prilocaine (EMLA) 2.5-2.5 % cream, Apply  topically to the appropriate area as directed As Needed (45-60 minutes prior to port access.  Cover with saran/plastic wrap.)., Disp: 30 g, Rfl: 3  •  magic mouthwash oral suspension, Swish and spit (or swallow) 1-2 Teaspoonfuls (5-10ml) 4 times a day as needed, Disp: 240 mL, Rfl: 3  •  Nebulizer device, 1 Device Take As Directed., Disp: 1 each, Rfl: 0  •  omeprazole (priLOSEC) 40 MG capsule, Take 1 capsule by mouth Daily., Disp: 90 capsule, Rfl: 2  •  ondansetron ODT (ZOFRAN-ODT) 8 MG disintegrating tablet, PLACE 1 TABLET ON THE TONGUE EVERY 8 (EIGHT) HOURS AS NEEDED FOR NAUSEA OR VOMITING., Disp: 30 tablet, Rfl: 4  •  pravastatin (PRAVACHOL) 40 MG tablet, Take 1 tablet by mouth Daily., Disp: 90 tablet, Rfl: 3  •  promethazine (PHENERGAN) 25 MG tablet, Take 1 tablet by mouth Every 6 (Six) Hours As Needed for Nausea or Vomiting for up to 60 doses., Disp: 45 tablet, Rfl: 5  •  Stiolto Respimat 2.5-2.5 MCG/ACT aerosol solution inhaler, INHALE 2 PUFFS BY MOUTH DAILY, Disp: 1 inhaler, Rfl: 1  •  traMADol (ULTRAM) 50 MG tablet, TAKE 1 TABLET BY MOUTH THREE TIMES A DAY, Disp: 270 tablet, Rfl: 1    PHYSICAL EXAMINATION:   /73   Pulse 119   Temp 97.7 °F (36.5 °C) (Temporal)   Resp 16   Ht 157.5 cm (62.01\")   Wt 49.4 kg (109 lb)   SpO2 97%   BMI 19.93 kg/m²    ECOG Performance Status: 1 - Symptomatic but completely ambulatory  General Appearance:  alert, cooperative, no apparent distress, appears stated age and normal weight   Neurologic/Psychiatric: A&O x 3, gait steady, appropriate affect, strength 5/5 in all muscle groups   HEENT:  Normocephalic, without obvious abnormality, mucous membranes moist   Neck: Supple, symmetrical, trachea midline, no adenopathy;  No thyromegaly, masses, or tenderness   Lungs:   Clear to auscultation bilaterally; respirations regular, even, and unlabored bilaterally   Heart:  Regular rate and rhythm, no murmurs " appreciated   Abdomen:   Soft, non-tender, non-distended and no organomegaly   Lymph nodes: No cervical, supraclavicular, inguinal or axillary adenopathy noted.  Submandibular nodule noted approximately 1cm   Extremities: Normal, atraumatic; no clubbing, cyanosis, or edema    Skin: No rashes, ulcers, or suspicious lesions noted     No visits with results within 2 Week(s) from this visit.   Latest known visit with results is:   Infusion on 09/02/2020   Component Date Value Ref Range Status   • Free T4 09/02/2020 2.05* 0.93 - 1.70 ng/dL Final   • TSH 09/02/2020 0.197* 0.270 - 4.200 uIU/mL Final   • Glucose 09/02/2020 109* 65 - 99 mg/dL Final   • BUN 09/02/2020 12  8 - 23 mg/dL Final   • Creatinine 09/02/2020 0.65  0.57 - 1.00 mg/dL Final   • Sodium 09/02/2020 132* 136 - 145 mmol/L Final   • Potassium 09/02/2020 4.3  3.5 - 5.2 mmol/L Final   • Chloride 09/02/2020 96* 98 - 107 mmol/L Final   • CO2 09/02/2020 26.0  22.0 - 29.0 mmol/L Final   • Calcium 09/02/2020 8.8  8.6 - 10.5 mg/dL Final   • Total Protein 09/02/2020 6.8  6.0 - 8.5 g/dL Final   • Albumin 09/02/2020 3.60  3.50 - 5.20 g/dL Final   • ALT (SGPT) 09/02/2020 8  1 - 33 U/L Final   • AST (SGOT) 09/02/2020 12  1 - 32 U/L Final   • Alkaline Phosphatase 09/02/2020 88  39 - 117 U/L Final   • Total Bilirubin 09/02/2020 0.2  0.0 - 1.2 mg/dL Final   • eGFR Non African Amer 09/02/2020 91  >60 mL/min/1.73 Final   • Globulin 09/02/2020 3.2  gm/dL Final   • A/G Ratio 09/02/2020 1.1  g/dL Final   • BUN/Creatinine Ratio 09/02/2020 18.5  7.0 - 25.0 Final   • Anion Gap 09/02/2020 10.0  5.0 - 15.0 mmol/L Final   • WBC 09/02/2020 12.08* 3.40 - 10.80 10*3/mm3 Final   • RBC 09/02/2020 3.94  3.77 - 5.28 10*6/mm3 Final   • Hemoglobin 09/02/2020 11.2* 12.0 - 15.9 g/dL Final   • Hematocrit 09/02/2020 34.4  34.0 - 46.6 % Final   • MCV 09/02/2020 87.3  79.0 - 97.0 fL Final   • MCH 09/02/2020 28.4  26.6 - 33.0 pg Final   • MCHC 09/02/2020 32.6  31.5 - 35.7 g/dL Final   • RDW 09/02/2020  17.9* 12.3 - 15.4 % Final   • RDW-SD 09/02/2020 57.6* 37.0 - 54.0 fl Final   • MPV 09/02/2020 8.5  6.0 - 12.0 fL Final   • Platelets 09/02/2020 328  140 - 450 10*3/mm3 Final   • Neutrophil % 09/02/2020 68.3  42.7 - 76.0 % Final   • Lymphocyte % 09/02/2020 19.5* 19.6 - 45.3 % Final   • Monocyte % 09/02/2020 10.2  5.0 - 12.0 % Final   • Eosinophil % 09/02/2020 1.0  0.3 - 6.2 % Final   • Basophil % 09/02/2020 0.5  0.0 - 1.5 % Final   • Immature Grans % 09/02/2020 0.5  0.0 - 0.5 % Final   • Neutrophils, Absolute 09/02/2020 8.25* 1.70 - 7.00 10*3/mm3 Final   • Lymphocytes, Absolute 09/02/2020 2.36  0.70 - 3.10 10*3/mm3 Final   • Monocytes, Absolute 09/02/2020 1.23* 0.10 - 0.90 10*3/mm3 Final   • Eosinophils, Absolute 09/02/2020 0.12  0.00 - 0.40 10*3/mm3 Final   • Basophils, Absolute 09/02/2020 0.06  0.00 - 0.20 10*3/mm3 Final   • Immature Grans, Absolute 09/02/2020 0.06* 0.00 - 0.05 10*3/mm3 Final   • nRBC 09/02/2020 0.0  0.0 - 0.2 /100 WBC Final      No results found.(    ASSESSMENT: The patient is a very pleasant 69 y.o. female  with right upper lobe extensive stage small cell lung cancer     PROBLEM LIST:   1.  Right upper lobe extensive stage small cell lung cancer, [id number] stage IVb:  A.  Presented with right lower ribs pain  B.  CT chest revealed 3.5 cm right upper lobe lung mass, right adrenal nodule, mediastinal adenopathy, and left cervical lymphadenopathy.  C.  Status post left cervical lymph node biopsy done by Dr. Owen December 2, 2019  D.  Pathology consistent with small cell carcinoma  E.  Started palliative treatment with carboplatin and etoposide and Tecentriq December 15, 2019, status post 8 cycles  F.  Progressive disease documented CT scans done on June 1, 2020 with a new cervical lymphadenopathy and relapsed right adrenal mass  G.  Started Opdivo plus Yervoy Brenda 10, 2020, status post 4 cycle  2.  COPD  3.  Hypertension  4.  Hypercholesterolemia  5.  Hypothyroid  6.  GERD  7.  Progressive nausea and  vomiting    PLAN:  1. We will proceed with maintenance Opdivo cycle #2, this is in compliance with NCCN guidelines.  2. The patient will follow up with us in 4 weeks maintenance cycle #3 opdivo.  3.  We will do 3 months follow-up study which will be due December 2020.   4.  I will continue to monitor the patient blood work including blood counts kidney function liver function electrolytes function.  5.  I will continue the patient on Zofran ODT as needed as well as phenergan daily as for chemotherapy-induced nausea.  We will stop Zyprexa due to side effects. We will plan to add Ativan q 8 hours prn for nausea.   6.  We reviewed potential side effects of immunotherapy including but not limited to immune mediated reactions with thyroiditis, pneumonitis, hepatitis, colitis, rash, and electrolytes abnormalities, fatigue, multiorgan failure, and possibly death.  7.  I will continue to monitor patient blood pressure will consider adjusting her antihypertensive agents if needed while she is on active cancer treatment.  8.  We will continue Synthroid daily for hypothyroid.  Patient is at risk for thyroiditis while she is on immunotherapy.  9. We will continue omeprazole for GERD.   10.  We will do follow-up study MRI down the road on as-needed basis.  11.  I will continue IV fluids only of normal saline weekly given her dehydration. We will also add zofran to the regimen weekly.  We can do it more often if needed.    Addendum: Free T4 2.05 and TSH elevated. We will hold synthroid for one week and decrease dose to 75mcg. A new prescription was sent to pharmacy.     Kathe Higuera, APRN  9/30/2020

## 2020-10-08 ENCOUNTER — INFUSION (OUTPATIENT)
Dept: ONCOLOGY | Facility: HOSPITAL | Age: 70
End: 2020-10-08

## 2020-10-08 VITALS
SYSTOLIC BLOOD PRESSURE: 121 MMHG | WEIGHT: 107 LBS | RESPIRATION RATE: 14 BRPM | BODY MASS INDEX: 19.56 KG/M2 | DIASTOLIC BLOOD PRESSURE: 78 MMHG | TEMPERATURE: 98 F | HEART RATE: 115 BPM

## 2020-10-08 DIAGNOSIS — C34.90 SCLC (SMALL CELL LUNG CARCINOMA) (HCC): Primary | ICD-10-CM

## 2020-10-08 PROCEDURE — 96360 HYDRATION IV INFUSION INIT: CPT

## 2020-10-08 PROCEDURE — 96361 HYDRATE IV INFUSION ADD-ON: CPT

## 2020-10-08 PROCEDURE — 25010000003 HEPARIN LOCK FLUSH PER 10 UNITS: Performed by: INTERNAL MEDICINE

## 2020-10-08 PROCEDURE — 96374 THER/PROPH/DIAG INJ IV PUSH: CPT

## 2020-10-08 PROCEDURE — 96375 TX/PRO/DX INJ NEW DRUG ADDON: CPT

## 2020-10-08 PROCEDURE — 25010000002 ONDANSETRON PER 1 MG: Performed by: NURSE PRACTITIONER

## 2020-10-08 RX ORDER — ONDANSETRON 2 MG/ML
8 INJECTION INTRAMUSCULAR; INTRAVENOUS ONCE
Status: COMPLETED | OUTPATIENT
Start: 2020-10-08 | End: 2020-10-08

## 2020-10-08 RX ORDER — SODIUM CHLORIDE 0.9 % (FLUSH) 0.9 %
10 SYRINGE (ML) INJECTION AS NEEDED
Status: DISCONTINUED | OUTPATIENT
Start: 2020-10-08 | End: 2020-10-08 | Stop reason: HOSPADM

## 2020-10-08 RX ORDER — HEPARIN SODIUM (PORCINE) LOCK FLUSH IV SOLN 100 UNIT/ML 100 UNIT/ML
500 SOLUTION INTRAVENOUS AS NEEDED
Status: CANCELLED | OUTPATIENT
Start: 2020-10-08

## 2020-10-08 RX ORDER — SODIUM CHLORIDE 0.9 % (FLUSH) 0.9 %
10 SYRINGE (ML) INJECTION AS NEEDED
Status: CANCELLED | OUTPATIENT
Start: 2020-10-08

## 2020-10-08 RX ORDER — HEPARIN SODIUM (PORCINE) LOCK FLUSH IV SOLN 100 UNIT/ML 100 UNIT/ML
500 SOLUTION INTRAVENOUS AS NEEDED
Status: DISCONTINUED | OUTPATIENT
Start: 2020-10-08 | End: 2020-10-08 | Stop reason: HOSPADM

## 2020-10-08 RX ADMIN — HEPARIN 500 UNITS: 100 SYRINGE at 15:39

## 2020-10-08 RX ADMIN — ONDANSETRON 8 MG: 2 INJECTION INTRAMUSCULAR; INTRAVENOUS at 14:29

## 2020-10-08 RX ADMIN — SODIUM CHLORIDE 1000 ML: 9 INJECTION, SOLUTION INTRAVENOUS at 14:18

## 2020-10-08 RX ADMIN — SODIUM CHLORIDE, PRESERVATIVE FREE 10 ML: 5 INJECTION INTRAVENOUS at 15:39

## 2020-10-09 ENCOUNTER — TELEPHONE (OUTPATIENT)
Dept: ONCOLOGY | Facility: CLINIC | Age: 70
End: 2020-10-09

## 2020-10-09 RX ORDER — LORAZEPAM 2 MG/ML
1 INJECTION INTRAMUSCULAR ONCE
Status: CANCELLED
Start: 2020-10-09

## 2020-10-09 NOTE — TELEPHONE ENCOUNTER
Patient's sister calling.    She said that she needs to speak to doctor about patient's condition.  She said the patient is getting sicker and sicker.  Since she cannot come to her appointments anymore, she doesn't know all of what is happening.She is nauseated and vomiting every day and has been in bed for the last week.    There is no verbal release on file for the office.  Sister made aware of this.  She said the patient is sick and she needs to know what to do.    [phone]

## 2020-10-09 NOTE — TELEPHONE ENCOUNTER
Pt's sister called back. She stated that they were asked to try and come in today but pt did not get much sleep last night and she is resting right now. They want to know if they can come Monday instead.

## 2020-10-09 NOTE — TELEPHONE ENCOUNTER
Discussed with SAMANTHA Thompson, and advised that she would like for pt to come in for another 1L Ns with antiemetics, and she will see her while she is in infusion. Advised that infusion can get her on the schedule at 1330 today

## 2020-10-15 ENCOUNTER — INFUSION (OUTPATIENT)
Dept: ONCOLOGY | Facility: HOSPITAL | Age: 70
End: 2020-10-15

## 2020-10-15 VITALS
BODY MASS INDEX: 19.55 KG/M2 | HEART RATE: 99 BPM | SYSTOLIC BLOOD PRESSURE: 125 MMHG | DIASTOLIC BLOOD PRESSURE: 73 MMHG | WEIGHT: 106.9 LBS

## 2020-10-15 DIAGNOSIS — C34.90 SCLC (SMALL CELL LUNG CARCINOMA) (HCC): Primary | ICD-10-CM

## 2020-10-15 PROCEDURE — 25010000002 ONDANSETRON PER 1 MG: Performed by: NURSE PRACTITIONER

## 2020-10-15 PROCEDURE — 96361 HYDRATE IV INFUSION ADD-ON: CPT

## 2020-10-15 PROCEDURE — 96360 HYDRATION IV INFUSION INIT: CPT

## 2020-10-15 PROCEDURE — 25010000003 HEPARIN LOCK FLUSH PER 10 UNITS: Performed by: INTERNAL MEDICINE

## 2020-10-15 PROCEDURE — 96375 TX/PRO/DX INJ NEW DRUG ADDON: CPT

## 2020-10-15 PROCEDURE — 96374 THER/PROPH/DIAG INJ IV PUSH: CPT

## 2020-10-15 RX ORDER — ONDANSETRON 2 MG/ML
8 INJECTION INTRAMUSCULAR; INTRAVENOUS ONCE
Status: COMPLETED | OUTPATIENT
Start: 2020-10-15 | End: 2020-10-15

## 2020-10-15 RX ORDER — HEPARIN SODIUM (PORCINE) LOCK FLUSH IV SOLN 100 UNIT/ML 100 UNIT/ML
500 SOLUTION INTRAVENOUS AS NEEDED
Status: CANCELLED | OUTPATIENT
Start: 2020-10-15

## 2020-10-15 RX ORDER — SODIUM CHLORIDE 0.9 % (FLUSH) 0.9 %
10 SYRINGE (ML) INJECTION AS NEEDED
Status: CANCELLED | OUTPATIENT
Start: 2020-10-15

## 2020-10-15 RX ORDER — SODIUM CHLORIDE 0.9 % (FLUSH) 0.9 %
10 SYRINGE (ML) INJECTION AS NEEDED
Status: DISCONTINUED | OUTPATIENT
Start: 2020-10-15 | End: 2020-10-15 | Stop reason: HOSPADM

## 2020-10-15 RX ORDER — HEPARIN SODIUM (PORCINE) LOCK FLUSH IV SOLN 100 UNIT/ML 100 UNIT/ML
500 SOLUTION INTRAVENOUS AS NEEDED
Status: DISCONTINUED | OUTPATIENT
Start: 2020-10-15 | End: 2020-10-15 | Stop reason: HOSPADM

## 2020-10-15 RX ADMIN — SODIUM CHLORIDE 1000 ML: 9 INJECTION, SOLUTION INTRAVENOUS at 14:34

## 2020-10-15 RX ADMIN — HEPARIN 500 UNITS: 100 SYRINGE at 16:00

## 2020-10-15 RX ADMIN — SODIUM CHLORIDE, PRESERVATIVE FREE 10 ML: 5 INJECTION INTRAVENOUS at 16:01

## 2020-10-15 RX ADMIN — ONDANSETRON 8 MG: 2 INJECTION INTRAMUSCULAR; INTRAVENOUS at 14:35

## 2020-10-22 ENCOUNTER — INFUSION (OUTPATIENT)
Dept: ONCOLOGY | Facility: HOSPITAL | Age: 70
End: 2020-10-22

## 2020-10-22 VITALS
RESPIRATION RATE: 16 BRPM | SYSTOLIC BLOOD PRESSURE: 134 MMHG | DIASTOLIC BLOOD PRESSURE: 78 MMHG | TEMPERATURE: 97.8 F | HEART RATE: 100 BPM

## 2020-10-22 DIAGNOSIS — C34.90 SCLC (SMALL CELL LUNG CARCINOMA) (HCC): Primary | ICD-10-CM

## 2020-10-22 PROCEDURE — 25010000002 ONDANSETRON PER 1 MG: Performed by: NURSE PRACTITIONER

## 2020-10-22 PROCEDURE — 25010000003 HEPARIN LOCK FLUSH PER 10 UNITS: Performed by: INTERNAL MEDICINE

## 2020-10-22 PROCEDURE — 96374 THER/PROPH/DIAG INJ IV PUSH: CPT

## 2020-10-22 PROCEDURE — 96375 TX/PRO/DX INJ NEW DRUG ADDON: CPT

## 2020-10-22 PROCEDURE — 96360 HYDRATION IV INFUSION INIT: CPT

## 2020-10-22 PROCEDURE — 96361 HYDRATE IV INFUSION ADD-ON: CPT

## 2020-10-22 RX ORDER — HEPARIN SODIUM (PORCINE) LOCK FLUSH IV SOLN 100 UNIT/ML 100 UNIT/ML
500 SOLUTION INTRAVENOUS AS NEEDED
Status: CANCELLED | OUTPATIENT
Start: 2020-10-22

## 2020-10-22 RX ORDER — ONDANSETRON 2 MG/ML
8 INJECTION INTRAMUSCULAR; INTRAVENOUS ONCE
Status: COMPLETED | OUTPATIENT
Start: 2020-10-22 | End: 2020-10-22

## 2020-10-22 RX ORDER — SODIUM CHLORIDE 0.9 % (FLUSH) 0.9 %
10 SYRINGE (ML) INJECTION AS NEEDED
Status: DISCONTINUED | OUTPATIENT
Start: 2020-10-22 | End: 2020-10-22 | Stop reason: HOSPADM

## 2020-10-22 RX ORDER — HEPARIN SODIUM (PORCINE) LOCK FLUSH IV SOLN 100 UNIT/ML 100 UNIT/ML
500 SOLUTION INTRAVENOUS AS NEEDED
Status: DISCONTINUED | OUTPATIENT
Start: 2020-10-22 | End: 2020-10-22 | Stop reason: HOSPADM

## 2020-10-22 RX ORDER — SODIUM CHLORIDE 0.9 % (FLUSH) 0.9 %
10 SYRINGE (ML) INJECTION AS NEEDED
Status: CANCELLED | OUTPATIENT
Start: 2020-10-22

## 2020-10-22 RX ADMIN — SODIUM CHLORIDE, PRESERVATIVE FREE 10 ML: 5 INJECTION INTRAVENOUS at 16:02

## 2020-10-22 RX ADMIN — SODIUM CHLORIDE 1000 ML: 9 INJECTION, SOLUTION INTRAVENOUS at 14:38

## 2020-10-22 RX ADMIN — HEPARIN 500 UNITS: 100 SYRINGE at 16:03

## 2020-10-22 RX ADMIN — ONDANSETRON 8 MG: 2 INJECTION INTRAMUSCULAR; INTRAVENOUS at 14:38

## 2020-10-23 ENCOUNTER — TELEPHONE (OUTPATIENT)
Dept: ONCOLOGY | Facility: CLINIC | Age: 70
End: 2020-10-23

## 2020-10-23 NOTE — TELEPHONE ENCOUNTER
Patient left a voicemail she got fluids yesterday, but she has thrown up since and still nauseated the nausea medication is not working. She also has a place on her ear that she thinks she may need an antibiotic for? Please call.

## 2020-10-23 NOTE — TELEPHONE ENCOUNTER
Called and spoke with patient.  She advised that she has only thrown up once today and that her nausea medications do seem to be working.  Advised her as long as this is the case to continue taking her medications as prescribed.  She also said that a place came up on her right ear two weeks ago and that the infusion RNs noticed yesterday that it was draining some clear drainage.  She put neosporin on it and wanted to know if we wanted to do anything else.  Advised to continue to do this and call us if it gets worse prior to next appt.  Discussed with  who was in agreement with above recommendations.

## 2020-10-28 ENCOUNTER — INFUSION (OUTPATIENT)
Dept: ONCOLOGY | Facility: HOSPITAL | Age: 70
End: 2020-10-28

## 2020-10-28 ENCOUNTER — OFFICE VISIT (OUTPATIENT)
Dept: ONCOLOGY | Facility: CLINIC | Age: 70
End: 2020-10-28

## 2020-10-28 VITALS
TEMPERATURE: 97.3 F | SYSTOLIC BLOOD PRESSURE: 120 MMHG | HEIGHT: 62 IN | DIASTOLIC BLOOD PRESSURE: 79 MMHG | WEIGHT: 105 LBS | OXYGEN SATURATION: 96 % | HEART RATE: 101 BPM | RESPIRATION RATE: 16 BRPM | BODY MASS INDEX: 19.32 KG/M2

## 2020-10-28 DIAGNOSIS — H61.91 EARLOBE LESION, RIGHT: Primary | ICD-10-CM

## 2020-10-28 DIAGNOSIS — C34.90 SCLC (SMALL CELL LUNG CARCINOMA) (HCC): Primary | ICD-10-CM

## 2020-10-28 LAB
ALBUMIN SERPL-MCNC: 2.7 G/DL (ref 3.5–5.2)
ALBUMIN/GLOB SERPL: 0.9 G/DL
ALP SERPL-CCNC: 141 U/L (ref 39–117)
ALT SERPL W P-5'-P-CCNC: 10 U/L (ref 1–33)
ANION GAP SERPL CALCULATED.3IONS-SCNC: 8.7 MMOL/L (ref 5–15)
AST SERPL-CCNC: 12 U/L (ref 1–32)
BASOPHILS # BLD AUTO: 0.07 10*3/MM3 (ref 0–0.2)
BASOPHILS NFR BLD AUTO: 0.5 % (ref 0–1.5)
BILIRUB SERPL-MCNC: 0.3 MG/DL (ref 0–1.2)
BUN SERPL-MCNC: 18 MG/DL (ref 8–23)
BUN/CREAT SERPL: 21.7 (ref 7–25)
CALCIUM SPEC-SCNC: 7.9 MG/DL (ref 8.6–10.5)
CHLORIDE SERPL-SCNC: 96 MMOL/L (ref 98–107)
CO2 SERPL-SCNC: 23.3 MMOL/L (ref 22–29)
CREAT SERPL-MCNC: 0.83 MG/DL (ref 0.57–1)
DEPRECATED RDW RBC AUTO: 52.9 FL (ref 37–54)
EOSINOPHIL # BLD AUTO: 0.01 10*3/MM3 (ref 0–0.4)
EOSINOPHIL NFR BLD AUTO: 0.1 % (ref 0.3–6.2)
ERYTHROCYTE [DISTWIDTH] IN BLOOD BY AUTOMATED COUNT: 16.1 % (ref 12.3–15.4)
GFR SERPL CREATININE-BSD FRML MDRD: 69 ML/MIN/1.73
GLOBULIN UR ELPH-MCNC: 3.1 GM/DL
GLUCOSE SERPL-MCNC: 96 MG/DL (ref 65–99)
HCT VFR BLD AUTO: 36.7 % (ref 34–46.6)
HGB BLD-MCNC: 11.9 G/DL (ref 12–15.9)
IMM GRANULOCYTES # BLD AUTO: 0.08 10*3/MM3 (ref 0–0.05)
IMM GRANULOCYTES NFR BLD AUTO: 0.6 % (ref 0–0.5)
LYMPHOCYTES # BLD AUTO: 2.34 10*3/MM3 (ref 0.7–3.1)
LYMPHOCYTES NFR BLD AUTO: 16.7 % (ref 19.6–45.3)
MCH RBC QN AUTO: 29 PG (ref 26.6–33)
MCHC RBC AUTO-ENTMCNC: 32.4 G/DL (ref 31.5–35.7)
MCV RBC AUTO: 89.5 FL (ref 79–97)
MONOCYTES # BLD AUTO: 1.39 10*3/MM3 (ref 0.1–0.9)
MONOCYTES NFR BLD AUTO: 9.9 % (ref 5–12)
NEUTROPHILS NFR BLD AUTO: 10.15 10*3/MM3 (ref 1.7–7)
NEUTROPHILS NFR BLD AUTO: 72.2 % (ref 42.7–76)
NRBC BLD AUTO-RTO: 0 /100 WBC (ref 0–0.2)
PLATELET # BLD AUTO: 266 10*3/MM3 (ref 140–450)
PMV BLD AUTO: 8.4 FL (ref 6–12)
POTASSIUM SERPL-SCNC: 4.1 MMOL/L (ref 3.5–5.2)
PROT SERPL-MCNC: 5.8 G/DL (ref 6–8.5)
RBC # BLD AUTO: 4.1 10*6/MM3 (ref 3.77–5.28)
SODIUM SERPL-SCNC: 128 MMOL/L (ref 136–145)
T4 FREE SERPL-MCNC: 1.13 NG/DL (ref 0.93–1.7)
TSH SERPL DL<=0.05 MIU/L-ACNC: 2.88 UIU/ML (ref 0.27–4.2)
WBC # BLD AUTO: 14.04 10*3/MM3 (ref 3.4–10.8)

## 2020-10-28 PROCEDURE — 85025 COMPLETE CBC W/AUTO DIFF WBC: CPT

## 2020-10-28 PROCEDURE — 99215 OFFICE O/P EST HI 40 MIN: CPT | Performed by: INTERNAL MEDICINE

## 2020-10-28 PROCEDURE — 96375 TX/PRO/DX INJ NEW DRUG ADDON: CPT

## 2020-10-28 PROCEDURE — 84439 ASSAY OF FREE THYROXINE: CPT

## 2020-10-28 PROCEDURE — 96361 HYDRATE IV INFUSION ADD-ON: CPT

## 2020-10-28 PROCEDURE — 25010000003 HEPARIN LOCK FLUSH PER 10 UNITS: Performed by: INTERNAL MEDICINE

## 2020-10-28 PROCEDURE — 96413 CHEMO IV INFUSION 1 HR: CPT

## 2020-10-28 PROCEDURE — 36415 COLL VENOUS BLD VENIPUNCTURE: CPT

## 2020-10-28 PROCEDURE — 80053 COMPREHEN METABOLIC PANEL: CPT

## 2020-10-28 PROCEDURE — 84443 ASSAY THYROID STIM HORMONE: CPT

## 2020-10-28 PROCEDURE — 25010000002 ONDANSETRON PER 1 MG: Performed by: INTERNAL MEDICINE

## 2020-10-28 PROCEDURE — 25010000002 NIVOLUMAB 240 MG/24ML SOLUTION 24 ML VIAL: Performed by: INTERNAL MEDICINE

## 2020-10-28 RX ORDER — SODIUM CHLORIDE 0.9 % (FLUSH) 0.9 %
10 SYRINGE (ML) INJECTION AS NEEDED
Status: DISCONTINUED | OUTPATIENT
Start: 2020-10-28 | End: 2020-10-28 | Stop reason: HOSPADM

## 2020-10-28 RX ORDER — ONDANSETRON 2 MG/ML
8 INJECTION INTRAMUSCULAR; INTRAVENOUS ONCE
Status: CANCELLED
Start: 2020-10-28

## 2020-10-28 RX ORDER — OLANZAPINE 5 MG/1
5 TABLET ORAL NIGHTLY
Qty: 30 TABLET | Refills: 5 | Status: SHIPPED | OUTPATIENT
Start: 2020-10-28 | End: 2020-10-28 | Stop reason: SDUPTHER

## 2020-10-28 RX ORDER — ONDANSETRON 2 MG/ML
8 INJECTION INTRAMUSCULAR; INTRAVENOUS ONCE
Status: COMPLETED | OUTPATIENT
Start: 2020-10-28 | End: 2020-10-28

## 2020-10-28 RX ORDER — SODIUM CHLORIDE 9 MG/ML
125 INJECTION, SOLUTION INTRAVENOUS ONCE
Status: DISCONTINUED | OUTPATIENT
Start: 2020-10-28 | End: 2020-10-28 | Stop reason: HOSPADM

## 2020-10-28 RX ORDER — SODIUM CHLORIDE 9 MG/ML
250 INJECTION, SOLUTION INTRAVENOUS ONCE
Status: CANCELLED | OUTPATIENT
Start: 2020-10-28

## 2020-10-28 RX ORDER — SODIUM CHLORIDE 9 MG/ML
250 INJECTION, SOLUTION INTRAVENOUS ONCE
Status: COMPLETED | OUTPATIENT
Start: 2020-10-28 | End: 2020-10-28

## 2020-10-28 RX ORDER — HEPARIN SODIUM (PORCINE) LOCK FLUSH IV SOLN 100 UNIT/ML 100 UNIT/ML
500 SOLUTION INTRAVENOUS AS NEEDED
Status: CANCELLED | OUTPATIENT
Start: 2020-10-28

## 2020-10-28 RX ORDER — HEPARIN SODIUM (PORCINE) LOCK FLUSH IV SOLN 100 UNIT/ML 100 UNIT/ML
500 SOLUTION INTRAVENOUS AS NEEDED
Status: DISCONTINUED | OUTPATIENT
Start: 2020-10-28 | End: 2020-10-28 | Stop reason: HOSPADM

## 2020-10-28 RX ORDER — OLANZAPINE 5 MG/1
5 TABLET ORAL NIGHTLY
Qty: 30 TABLET | Refills: 5 | Status: SHIPPED | OUTPATIENT
Start: 2020-10-28

## 2020-10-28 RX ORDER — SODIUM CHLORIDE 0.9 % (FLUSH) 0.9 %
10 SYRINGE (ML) INJECTION AS NEEDED
Status: CANCELLED | OUTPATIENT
Start: 2020-10-28

## 2020-10-28 RX ADMIN — SODIUM CHLORIDE 480 MG: 900 INJECTION, SOLUTION INTRAVENOUS at 14:43

## 2020-10-28 RX ADMIN — HEPARIN 500 UNITS: 100 SYRINGE at 16:03

## 2020-10-28 RX ADMIN — SODIUM CHLORIDE 1000 ML: 9 INJECTION, SOLUTION INTRAVENOUS at 14:46

## 2020-10-28 RX ADMIN — ONDANSETRON 8 MG: 2 INJECTION INTRAMUSCULAR; INTRAVENOUS at 14:40

## 2020-10-28 RX ADMIN — SODIUM CHLORIDE, PRESERVATIVE FREE 10 ML: 5 INJECTION INTRAVENOUS at 16:03

## 2020-10-28 NOTE — PROGRESS NOTES
DATE OF VISIT: 10/28/2020    REASON FOR VISIT: Followup for extensive stage small cell lung cancer     HISTORY OF PRESENT ILLNESS: The patient is a very pleasant 69 y.o. female with past medical history significant for extensive stage small cell lung cancer diagnosed December 2, 2019.  She was started on palliative treatment with carboplatin and etoposide and Tecentriq December 15, 2019. Tecentriq maintenance started 03/11/2020.  The patient had progressive disease and she was switched to Opdivo plus Yervoy.  She completed 4 cycles August 13, 2020.  The patient is here today for follow-up visit with treatment cycle #3 opdivo single agent.    SUBJECTIVE: Filippo is here today by herself.  She is complaining of nausea despite taking Zofran Phenergan and as needed Ativan.  She has a new skin lesion of her right ear that is painless been going on for the last 3 weeks.    PAST MEDICAL HISTORY/SOCIAL HISTORY/FAMILY HISTORY: Reviewed by me and unchanged from my documentation done on 10/28/20.    Review of Systems   Constitutional: Positive for fatigue. Negative for activity change, appetite change, chills, fever and unexpected weight change.   HENT: Negative for hearing loss, mouth sores, nosebleeds, sore throat and trouble swallowing.    Eyes: Negative for visual disturbance.   Respiratory: Negative for cough, chest tightness, shortness of breath and wheezing.    Cardiovascular: Negative for chest pain and palpitations.   Gastrointestinal: Positive for constipation, nausea and vomiting. Negative for abdominal distention, abdominal pain, blood in stool, diarrhea and rectal pain.   Endocrine: Negative for cold intolerance and heat intolerance.   Genitourinary: Negative for difficulty urinating, dysuria, frequency and urgency.   Musculoskeletal: Negative for arthralgias, back pain, gait problem, joint swelling and myalgias.   Skin: Negative for rash.   Neurological: Negative for dizziness, tremors, syncope, weakness,  light-headedness, numbness and headaches.   Hematological: Negative for adenopathy. Does not bruise/bleed easily.   Psychiatric/Behavioral: Negative for confusion, sleep disturbance and suicidal ideas. The patient is not nervous/anxious.          Current Outpatient Medications:   •  acetaminophen (TYLENOL) 500 MG tablet, Take 500 mg by mouth Every 6 (Six) Hours As Needed for Mild Pain ., Disp: , Rfl:   •  albuterol sulfate HFA (Ventolin HFA) 108 (90 Base) MCG/ACT inhaler, Inhale 2 puffs Every 4 (Four) Hours As Needed for Wheezing or Shortness of Air., Disp: 18 g, Rfl: 5  •  amLODIPine (NORVASC) 5 MG tablet, Take 1 tablet by mouth Daily., Disp: 90 tablet, Rfl: 3  •  baclofen (LIORESAL) 10 MG tablet, START- ONE TAB NIGHTLY X7 DAYS, THEN 1 TAB 2X A DAY X7 DAYS, THEN 1 TAB 3X A DAY, Disp: 270 tablet, Rfl: 1  •  Calcium Carbonate-Vitamin D (CALCIUM-CARB 600 + D) 600-125 MG-UNIT tablet, Take 500 mg by mouth 2 (Two) Times a Day., Disp: 60 each, Rfl: 5  •  diclofenac (VOLTAREN) 75 MG EC tablet, Take 1 tablet by mouth Daily As Needed (pain)., Disp: 90 tablet, Rfl: 3  •  docusate sodium (COLACE) 100 MG capsule, Take 1 tablet twice daily, Disp: 60 capsule, Rfl: 3  •  gabapentin (NEURONTIN) 300 MG capsule, Take 1 capsule by mouth Daily With Breakfast & Lunch AND 2 capsules Every Night., Disp: 360 capsule, Rfl: 0  •  ipratropium-albuterol (DUO-NEB) 0.5-2.5 mg/3 ml nebulizer, Take 3 mL by nebulization 4 (Four) Times a Day As Needed for Wheezing or Shortness of Air., Disp: 120 mL, Rfl: 5  •  levothyroxine (SYNTHROID, LEVOTHROID) 75 MCG tablet, Take 1 tablet by mouth Daily., Disp: 30 tablet, Rfl: 3  •  lidocaine (LIDODERM) 5 %, Place 1 patch on the skin as directed by provider Daily. Remove & Discard patch within 12 hours or as directed by MD, Disp: 90 each, Rfl: 3  •  Lidocaine Viscous HCl (XYLOCAINE) 2 % solution, , Disp: , Rfl:   •  lidocaine-prilocaine (EMLA) 2.5-2.5 % cream, Apply  topically to the appropriate area as directed  "As Needed (45-60 minutes prior to port access.  Cover with saran/plastic wrap.)., Disp: 30 g, Rfl: 3  •  LORazepam (Ativan) 1 MG tablet, Take 1 tablet by mouth Every 8 (Eight) Hours As Needed for Anxiety., Disp: 90 tablet, Rfl: 2  •  magic mouthwash oral suspension, Swish and spit (or swallow) 1-2 Teaspoonfuls (5-10ml) 4 times a day as needed, Disp: 240 mL, Rfl: 3  •  Nebulizer device, 1 Device Take As Directed., Disp: 1 each, Rfl: 0  •  omeprazole (priLOSEC) 40 MG capsule, Take 1 capsule by mouth Daily., Disp: 90 capsule, Rfl: 2  •  ondansetron ODT (ZOFRAN-ODT) 8 MG disintegrating tablet, PLACE 1 TABLET ON THE TONGUE EVERY 8 (EIGHT) HOURS AS NEEDED FOR NAUSEA OR VOMITING., Disp: 30 tablet, Rfl: 4  •  pravastatin (PRAVACHOL) 40 MG tablet, Take 1 tablet by mouth Daily., Disp: 90 tablet, Rfl: 3  •  promethazine (PHENERGAN) 25 MG tablet, Take 1 tablet by mouth Every 6 (Six) Hours As Needed for Nausea or Vomiting for up to 60 doses., Disp: 45 tablet, Rfl: 5  •  Stiolto Respimat 2.5-2.5 MCG/ACT aerosol solution inhaler, INHALE 2 PUFFS BY MOUTH DAILY, Disp: 1 inhaler, Rfl: 1  •  traMADol (ULTRAM) 50 MG tablet, TAKE 1 TABLET BY MOUTH THREE TIMES A DAY, Disp: 270 tablet, Rfl: 1    PHYSICAL EXAMINATION:   /79   Pulse 101   Temp 97.3 °F (36.3 °C) (Temporal)   Resp 16   Ht 157.5 cm (62\")   Wt 47.6 kg (105 lb)   SpO2 96%   BMI 19.20 kg/m²    ECOG Performance Status: 1 - Symptomatic but completely ambulatory  General Appearance:  alert, cooperative, no apparent distress, appears stated age and normal weight   Neurologic/Psychiatric: A&O x 3, gait steady, appropriate affect, strength 5/5 in all muscle groups   HEENT:  Normocephalic, without obvious abnormality, mucous membranes moist   Neck: Supple, symmetrical, trachea midline, no adenopathy;  No thyromegaly, masses, or tenderness   Lungs:   Clear to auscultation bilaterally; respirations regular, even, and unlabored bilaterally   Heart:  Regular rate and rhythm, no " murmurs appreciated   Abdomen:   Soft, non-tender, non-distended and no organomegaly   Lymph nodes: No cervical, supraclavicular, inguinal or axillary adenopathy noted.  Submandibular nodule noted approximately 1cm   Extremities: Normal, atraumatic; no clubbing, cyanosis, or edema    Skin: No rashes, ulcers, or suspicious lesions noted     No visits with results within 2 Week(s) from this visit.   Latest known visit with results is:   Infusion on 09/30/2020   Component Date Value Ref Range Status   • Free T4 09/30/2020 2.05* 0.93 - 1.70 ng/dL Final   • TSH 09/30/2020 0.159* 0.270 - 4.200 uIU/mL Final   • Glucose 09/30/2020 114* 65 - 99 mg/dL Final   • BUN 09/30/2020 16  8 - 23 mg/dL Final   • Creatinine 09/30/2020 0.77  0.57 - 1.00 mg/dL Final   • Sodium 09/30/2020 127* 136 - 145 mmol/L Final   • Potassium 09/30/2020 4.1  3.5 - 5.2 mmol/L Final   • Chloride 09/30/2020 93* 98 - 107 mmol/L Final   • CO2 09/30/2020 24.0  22.0 - 29.0 mmol/L Final   • Calcium 09/30/2020 8.7  8.6 - 10.5 mg/dL Final   • Total Protein 09/30/2020 6.6  6.0 - 8.5 g/dL Final   • Albumin 09/30/2020 3.40* 3.50 - 5.20 g/dL Final   • ALT (SGPT) 09/30/2020 7  1 - 33 U/L Final   • AST (SGOT) 09/30/2020 11  1 - 32 U/L Final   • Alkaline Phosphatase 09/30/2020 95  39 - 117 U/L Final   • Total Bilirubin 09/30/2020 0.2  0.0 - 1.2 mg/dL Final   • eGFR Non African Amer 09/30/2020 75  >60 mL/min/1.73 Final   • Globulin 09/30/2020 3.2  gm/dL Final   • A/G Ratio 09/30/2020 1.1  g/dL Final   • BUN/Creatinine Ratio 09/30/2020 20.8  7.0 - 25.0 Final   • Anion Gap 09/30/2020 10.0  5.0 - 15.0 mmol/L Final   • WBC 09/30/2020 11.77* 3.40 - 10.80 10*3/mm3 Final   • RBC 09/30/2020 4.18  3.77 - 5.28 10*6/mm3 Final   • Hemoglobin 09/30/2020 12.3  12.0 - 15.9 g/dL Final   • Hematocrit 09/30/2020 37.1  34.0 - 46.6 % Final   • MCV 09/30/2020 88.8  79.0 - 97.0 fL Final   • MCH 09/30/2020 29.4  26.6 - 33.0 pg Final   • MCHC 09/30/2020 33.2  31.5 - 35.7 g/dL Final   • RDW  09/30/2020 15.7* 12.3 - 15.4 % Final   • RDW-SD 09/30/2020 51.0  37.0 - 54.0 fl Final   • MPV 09/30/2020 8.7  6.0 - 12.0 fL Final   • Platelets 09/30/2020 284  140 - 450 10*3/mm3 Final   • Neutrophil % 09/30/2020 72.7  42.7 - 76.0 % Final   • Lymphocyte % 09/30/2020 17.8* 19.6 - 45.3 % Final   • Monocyte % 09/30/2020 8.6  5.0 - 12.0 % Final   • Eosinophil % 09/30/2020 0.1* 0.3 - 6.2 % Final   • Basophil % 09/30/2020 0.3  0.0 - 1.5 % Final   • Immature Grans % 09/30/2020 0.5  0.0 - 0.5 % Final   • Neutrophils, Absolute 09/30/2020 8.55* 1.70 - 7.00 10*3/mm3 Final   • Lymphocytes, Absolute 09/30/2020 2.10  0.70 - 3.10 10*3/mm3 Final   • Monocytes, Absolute 09/30/2020 1.01* 0.10 - 0.90 10*3/mm3 Final   • Eosinophils, Absolute 09/30/2020 0.01  0.00 - 0.40 10*3/mm3 Final   • Basophils, Absolute 09/30/2020 0.04  0.00 - 0.20 10*3/mm3 Final   • Immature Grans, Absolute 09/30/2020 0.06* 0.00 - 0.05 10*3/mm3 Final   • nRBC 09/30/2020 0.0  0.0 - 0.2 /100 WBC Final      No results found.(    ASSESSMENT: The patient is a very pleasant 69 y.o. female  with right upper lobe extensive stage small cell lung cancer     PROBLEM LIST:   1.  Right upper lobe extensive stage small cell lung cancer, [id number] stage IVb:  A.  Presented with right lower ribs pain  B.  CT chest revealed 3.5 cm right upper lobe lung mass, right adrenal nodule, mediastinal adenopathy, and left cervical lymphadenopathy.  C.  Status post left cervical lymph node biopsy done by Dr. Owen December 2, 2019  D.  Pathology consistent with small cell carcinoma  E.  Started palliative treatment with carboplatin and etoposide and Tecentriq December 15, 2019, status post 8 cycles  F.  Progressive disease documented CT scans done on June 1, 2020 with a new cervical lymphadenopathy and relapsed right adrenal mass  G.  Started Opdivo plus Yervoy Brenda 10, 2020, status post 4 cycle followed by Opdivo single agent status post 2 cycles  2.  COPD  3.  Hypertension  4.   Hypercholesterolemia  5.  Hypothyroid  6.  GERD  7.  Progressive nausea and vomiting    PLAN:  1. We will proceed with maintenance Opdivo cycle 3, this is in compliance with NCCN guidelines.  2. The patient will follow up with us in 4 weeks maintenance cycle #4 opdivo.  3.  We will do 3 months follow-up study which will be due December 2020.  Those will be ordered prior to cycle #5.  4.  I will continue to monitor the patient blood work including blood counts kidney function liver function electrolytes function.  5.  I will continue the patient on Zofran ODT as needed as well as phenergan daily as for chemotherapy-induced nausea.  I will add Zyprexa 5 mg daily.  I will continue Ativan q 8 hours prn for nausea.   6.  We reviewed potential side effects of immunotherapy including but not limited to immune mediated reactions with thyroiditis, pneumonitis, hepatitis, colitis, rash, and electrolytes abnormalities, fatigue, multiorgan failure, and possibly death.  7.  I will continue to monitor patient blood pressure will consider adjusting her antihypertensive agents if needed while she is on active cancer treatment.  8.  We will continue Synthroid daily for hypothyroid.  Patient is at risk for thyroiditis while she is on immunotherapy.  9. We will continue omeprazole for GERD.   10.  We will do follow-up study MRI down the road on as-needed basis.  11.  I will continue IV fluids only of normal saline weekly given her dehydration. We will also add zofran to the regimen weekly.  We can do it more often if needed.  12.  I will refer the patient to dermatology for persistent right ear ulcer.  Rupesh Cao MD  10/28/2020

## 2020-11-03 ENCOUNTER — INFUSION (OUTPATIENT)
Dept: ONCOLOGY | Facility: HOSPITAL | Age: 70
End: 2020-11-03

## 2020-11-03 VITALS
SYSTOLIC BLOOD PRESSURE: 134 MMHG | TEMPERATURE: 98.3 F | RESPIRATION RATE: 14 BRPM | HEART RATE: 97 BPM | DIASTOLIC BLOOD PRESSURE: 72 MMHG

## 2020-11-03 DIAGNOSIS — C34.90 SCLC (SMALL CELL LUNG CARCINOMA) (HCC): Primary | ICD-10-CM

## 2020-11-03 PROCEDURE — 96375 TX/PRO/DX INJ NEW DRUG ADDON: CPT

## 2020-11-03 PROCEDURE — 25010000003 HEPARIN LOCK FLUSH PER 10 UNITS: Performed by: INTERNAL MEDICINE

## 2020-11-03 PROCEDURE — 96361 HYDRATE IV INFUSION ADD-ON: CPT

## 2020-11-03 PROCEDURE — 25010000002 ONDANSETRON PER 1 MG: Performed by: NURSE PRACTITIONER

## 2020-11-03 PROCEDURE — 96374 THER/PROPH/DIAG INJ IV PUSH: CPT

## 2020-11-03 PROCEDURE — 96360 HYDRATION IV INFUSION INIT: CPT

## 2020-11-03 RX ORDER — SODIUM CHLORIDE 0.9 % (FLUSH) 0.9 %
10 SYRINGE (ML) INJECTION AS NEEDED
Status: DISCONTINUED | OUTPATIENT
Start: 2020-11-03 | End: 2020-11-03 | Stop reason: HOSPADM

## 2020-11-03 RX ORDER — PROCHLORPERAZINE MALEATE 10 MG
10 TABLET ORAL EVERY 6 HOURS PRN
Qty: 60 TABLET | Refills: 5 | Status: SHIPPED | OUTPATIENT
Start: 2020-11-03

## 2020-11-03 RX ORDER — SODIUM CHLORIDE 0.9 % (FLUSH) 0.9 %
10 SYRINGE (ML) INJECTION AS NEEDED
Status: CANCELLED | OUTPATIENT
Start: 2020-11-03

## 2020-11-03 RX ORDER — ONDANSETRON 2 MG/ML
8 INJECTION INTRAMUSCULAR; INTRAVENOUS ONCE
Status: COMPLETED | OUTPATIENT
Start: 2020-11-03 | End: 2020-11-03

## 2020-11-03 RX ORDER — HEPARIN SODIUM (PORCINE) LOCK FLUSH IV SOLN 100 UNIT/ML 100 UNIT/ML
500 SOLUTION INTRAVENOUS AS NEEDED
Status: CANCELLED | OUTPATIENT
Start: 2020-11-03

## 2020-11-03 RX ORDER — HEPARIN SODIUM (PORCINE) LOCK FLUSH IV SOLN 100 UNIT/ML 100 UNIT/ML
500 SOLUTION INTRAVENOUS AS NEEDED
Status: DISCONTINUED | OUTPATIENT
Start: 2020-11-03 | End: 2020-11-03 | Stop reason: HOSPADM

## 2020-11-03 RX ADMIN — SODIUM CHLORIDE, PRESERVATIVE FREE 10 ML: 5 INJECTION INTRAVENOUS at 15:53

## 2020-11-03 RX ADMIN — HEPARIN 500 UNITS: 100 SYRINGE at 15:53

## 2020-11-03 RX ADMIN — SODIUM CHLORIDE 1000 ML: 9 INJECTION, SOLUTION INTRAVENOUS at 14:05

## 2020-11-03 RX ADMIN — ONDANSETRON 8 MG: 2 INJECTION INTRAMUSCULAR; INTRAVENOUS at 14:05

## 2020-11-03 NOTE — PROGRESS NOTES
Patient was in infusion complaining about nausea. Phenergan is not helping. We will stop phenergan and add compazine. Patient verbalized understanding and will call office if this does not help.

## 2020-11-10 ENCOUNTER — TELEPHONE (OUTPATIENT)
Dept: ONCOLOGY | Facility: CLINIC | Age: 70
End: 2020-11-10

## 2020-11-10 ENCOUNTER — INFUSION (OUTPATIENT)
Dept: ONCOLOGY | Facility: HOSPITAL | Age: 70
End: 2020-11-10

## 2020-11-10 VITALS
HEART RATE: 112 BPM | DIASTOLIC BLOOD PRESSURE: 70 MMHG | TEMPERATURE: 98 F | RESPIRATION RATE: 14 BRPM | SYSTOLIC BLOOD PRESSURE: 143 MMHG

## 2020-11-10 DIAGNOSIS — C34.90 SCLC (SMALL CELL LUNG CARCINOMA) (HCC): Primary | ICD-10-CM

## 2020-11-10 LAB
BACTERIA UR QL AUTO: NORMAL /HPF
BILIRUB UR QL STRIP: NEGATIVE
CLARITY UR: CLEAR
COLOR UR: YELLOW
GLUCOSE UR STRIP-MCNC: NEGATIVE MG/DL
HGB UR QL STRIP.AUTO: NEGATIVE
HYALINE CASTS UR QL AUTO: NORMAL /LPF
KETONES UR QL STRIP: NEGATIVE
LEUKOCYTE ESTERASE UR QL STRIP.AUTO: NEGATIVE
NITRITE UR QL STRIP: NEGATIVE
PH UR STRIP.AUTO: <=5 [PH] (ref 5–8)
PROT UR QL STRIP: NEGATIVE
RBC # UR: NORMAL /HPF
REF LAB TEST METHOD: NORMAL
SP GR UR STRIP: 1.01 (ref 1–1.03)
SQUAMOUS #/AREA URNS HPF: NORMAL /HPF
UROBILINOGEN UR QL STRIP: NORMAL
WBC UR QL AUTO: NORMAL /HPF

## 2020-11-10 PROCEDURE — 96361 HYDRATE IV INFUSION ADD-ON: CPT

## 2020-11-10 PROCEDURE — 96375 TX/PRO/DX INJ NEW DRUG ADDON: CPT

## 2020-11-10 PROCEDURE — 81001 URINALYSIS AUTO W/SCOPE: CPT

## 2020-11-10 PROCEDURE — 25010000003 HEPARIN LOCK FLUSH PER 10 UNITS: Performed by: INTERNAL MEDICINE

## 2020-11-10 PROCEDURE — 25010000002 ONDANSETRON PER 1 MG: Performed by: NURSE PRACTITIONER

## 2020-11-10 PROCEDURE — 96360 HYDRATION IV INFUSION INIT: CPT

## 2020-11-10 PROCEDURE — 96374 THER/PROPH/DIAG INJ IV PUSH: CPT

## 2020-11-10 RX ORDER — ONDANSETRON 2 MG/ML
8 INJECTION INTRAMUSCULAR; INTRAVENOUS ONCE
Status: COMPLETED | OUTPATIENT
Start: 2020-11-10 | End: 2020-11-10

## 2020-11-10 RX ORDER — SODIUM CHLORIDE 0.9 % (FLUSH) 0.9 %
10 SYRINGE (ML) INJECTION AS NEEDED
Status: CANCELLED | OUTPATIENT
Start: 2020-11-10

## 2020-11-10 RX ORDER — HEPARIN SODIUM (PORCINE) LOCK FLUSH IV SOLN 100 UNIT/ML 100 UNIT/ML
500 SOLUTION INTRAVENOUS AS NEEDED
Status: DISCONTINUED | OUTPATIENT
Start: 2020-11-10 | End: 2020-11-10 | Stop reason: HOSPADM

## 2020-11-10 RX ORDER — SODIUM CHLORIDE 0.9 % (FLUSH) 0.9 %
10 SYRINGE (ML) INJECTION AS NEEDED
Status: DISCONTINUED | OUTPATIENT
Start: 2020-11-10 | End: 2020-11-10 | Stop reason: HOSPADM

## 2020-11-10 RX ORDER — HEPARIN SODIUM (PORCINE) LOCK FLUSH IV SOLN 100 UNIT/ML 100 UNIT/ML
500 SOLUTION INTRAVENOUS AS NEEDED
Status: CANCELLED | OUTPATIENT
Start: 2020-11-10

## 2020-11-10 RX ADMIN — ONDANSETRON 8 MG: 2 INJECTION INTRAMUSCULAR; INTRAVENOUS at 10:27

## 2020-11-10 RX ADMIN — SODIUM CHLORIDE 1000 ML: 9 INJECTION, SOLUTION INTRAVENOUS at 10:27

## 2020-11-10 RX ADMIN — HEPARIN 500 UNITS: 100 SYRINGE at 12:20

## 2020-11-10 NOTE — TELEPHONE ENCOUNTER
Spoke with Dr. Cao - advised to refer to Dermatology in [place].  Order put in and message routed to [place] office to refer.

## 2020-11-10 NOTE — TELEPHONE ENCOUNTER
----- Message from Aminata Jaramillo RN sent at 11/10/2020 10:57 AM EST -----  Regarding: KILEY-pt has wound on R ear  Noticed pt has wound on R ear.  She reports its getting worse, began to drain last night.  She said Kiley mentioned he may refer her to dermatology at last visit.  I think it needs to be seen by someone, either derm or LID.  Please let me know if I can do anything.  Thanks

## 2020-11-14 ENCOUNTER — APPOINTMENT (OUTPATIENT)
Dept: GENERAL RADIOLOGY | Facility: HOSPITAL | Age: 70
End: 2020-11-14

## 2020-11-14 ENCOUNTER — HOSPITAL ENCOUNTER (EMERGENCY)
Facility: HOSPITAL | Age: 70
Discharge: HOME | End: 2020-11-15
Attending: EMERGENCY MEDICINE | Admitting: EMERGENCY MEDICINE

## 2020-11-14 DIAGNOSIS — R60.0 BILATERAL LOWER EXTREMITY EDEMA: Primary | ICD-10-CM

## 2020-11-14 LAB
ALBUMIN SERPL-MCNC: 2.4 G/DL (ref 3.5–5.2)
ALBUMIN/GLOB SERPL: 0.8 G/DL
ALP SERPL-CCNC: 157 U/L (ref 39–117)
ALT SERPL W P-5'-P-CCNC: 15 U/L (ref 1–33)
ANION GAP SERPL CALCULATED.3IONS-SCNC: 9.4 MMOL/L (ref 5–15)
AST SERPL-CCNC: 20 U/L (ref 1–32)
BASOPHILS # BLD AUTO: 0.05 10*3/MM3 (ref 0–0.2)
BASOPHILS NFR BLD AUTO: 0.6 % (ref 0–1.5)
BILIRUB SERPL-MCNC: 0.3 MG/DL (ref 0–1.2)
BUN SERPL-MCNC: 18 MG/DL (ref 8–23)
BUN/CREAT SERPL: 17.5 (ref 7–25)
CALCIUM SPEC-SCNC: 8 MG/DL (ref 8.6–10.5)
CHLORIDE SERPL-SCNC: 100 MMOL/L (ref 98–107)
CO2 SERPL-SCNC: 20.6 MMOL/L (ref 22–29)
CREAT SERPL-MCNC: 1.03 MG/DL (ref 0.57–1)
DEPRECATED RDW RBC AUTO: 55.3 FL (ref 37–54)
EOSINOPHIL # BLD AUTO: 0.01 10*3/MM3 (ref 0–0.4)
EOSINOPHIL NFR BLD AUTO: 0.1 % (ref 0.3–6.2)
ERYTHROCYTE [DISTWIDTH] IN BLOOD BY AUTOMATED COUNT: 16.9 % (ref 12.3–15.4)
GFR SERPL CREATININE-BSD FRML MDRD: 53 ML/MIN/1.73
GLOBULIN UR ELPH-MCNC: 3.1 GM/DL
GLUCOSE SERPL-MCNC: 105 MG/DL (ref 65–99)
HCT VFR BLD AUTO: 33.9 % (ref 34–46.6)
HGB BLD-MCNC: 11.3 G/DL (ref 12–15.9)
IMM GRANULOCYTES # BLD AUTO: 0.09 10*3/MM3 (ref 0–0.05)
IMM GRANULOCYTES NFR BLD AUTO: 1.1 % (ref 0–0.5)
LIPASE SERPL-CCNC: 15 U/L (ref 13–60)
LYMPHOCYTES # BLD AUTO: 2.61 10*3/MM3 (ref 0.7–3.1)
LYMPHOCYTES NFR BLD AUTO: 33.3 % (ref 19.6–45.3)
MCH RBC QN AUTO: 29.9 PG (ref 26.6–33)
MCHC RBC AUTO-ENTMCNC: 33.3 G/DL (ref 31.5–35.7)
MCV RBC AUTO: 89.7 FL (ref 79–97)
MONOCYTES # BLD AUTO: 0.99 10*3/MM3 (ref 0.1–0.9)
MONOCYTES NFR BLD AUTO: 12.6 % (ref 5–12)
NEUTROPHILS NFR BLD AUTO: 4.09 10*3/MM3 (ref 1.7–7)
NEUTROPHILS NFR BLD AUTO: 52.3 % (ref 42.7–76)
NRBC BLD AUTO-RTO: 0 /100 WBC (ref 0–0.2)
NT-PROBNP SERPL-MCNC: 354.9 PG/ML (ref 0–900)
PLATELET # BLD AUTO: 256 10*3/MM3 (ref 140–450)
PMV BLD AUTO: 8.5 FL (ref 6–12)
POTASSIUM SERPL-SCNC: 4.1 MMOL/L (ref 3.5–5.2)
PROT SERPL-MCNC: 5.5 G/DL (ref 6–8.5)
RBC # BLD AUTO: 3.78 10*6/MM3 (ref 3.77–5.28)
SODIUM SERPL-SCNC: 130 MMOL/L (ref 136–145)
TROPONIN T SERPL-MCNC: <0.01 NG/ML (ref 0–0.03)
WBC # BLD AUTO: 7.84 10*3/MM3 (ref 3.4–10.8)

## 2020-11-14 PROCEDURE — 93005 ELECTROCARDIOGRAM TRACING: CPT | Performed by: PHYSICIAN ASSISTANT

## 2020-11-14 PROCEDURE — 84484 ASSAY OF TROPONIN QUANT: CPT | Performed by: PHYSICIAN ASSISTANT

## 2020-11-14 PROCEDURE — 83690 ASSAY OF LIPASE: CPT | Performed by: PHYSICIAN ASSISTANT

## 2020-11-14 PROCEDURE — 99283 EMERGENCY DEPT VISIT LOW MDM: CPT

## 2020-11-14 PROCEDURE — 71045 X-RAY EXAM CHEST 1 VIEW: CPT

## 2020-11-14 PROCEDURE — 83880 ASSAY OF NATRIURETIC PEPTIDE: CPT | Performed by: PHYSICIAN ASSISTANT

## 2020-11-14 PROCEDURE — 85025 COMPLETE CBC W/AUTO DIFF WBC: CPT | Performed by: PHYSICIAN ASSISTANT

## 2020-11-14 PROCEDURE — 80053 COMPREHEN METABOLIC PANEL: CPT | Performed by: PHYSICIAN ASSISTANT

## 2020-11-14 RX ORDER — FUROSEMIDE 20 MG/1
20 TABLET ORAL DAILY PRN
Qty: 5 TABLET | Refills: 0 | Status: SHIPPED | OUTPATIENT
Start: 2020-11-14 | End: 2020-12-07 | Stop reason: SDUPTHER

## 2020-11-14 RX ORDER — FUROSEMIDE 20 MG/1
20 TABLET ORAL DAILY PRN
Qty: 5 TABLET | Refills: 0 | Status: SHIPPED | OUTPATIENT
Start: 2020-11-14 | End: 2020-11-14 | Stop reason: SDUPTHER

## 2020-11-14 RX ORDER — SODIUM CHLORIDE 0.9 % (FLUSH) 0.9 %
10 SYRINGE (ML) INJECTION AS NEEDED
Status: DISCONTINUED | OUTPATIENT
Start: 2020-11-14 | End: 2020-11-15 | Stop reason: HOSPADM

## 2020-11-15 VITALS
RESPIRATION RATE: 18 BRPM | WEIGHT: 100 LBS | HEIGHT: 62 IN | TEMPERATURE: 98.7 F | OXYGEN SATURATION: 97 % | BODY MASS INDEX: 18.4 KG/M2 | HEART RATE: 77 BPM | SYSTOLIC BLOOD PRESSURE: 141 MMHG | DIASTOLIC BLOOD PRESSURE: 85 MMHG

## 2020-11-15 NOTE — ED PROVIDER NOTES
Subjective   This patient comes in for evaluation of 1 to 1/2 months of generalized malaise fatigue, weakness in her bilateral upper and lower extremities, bilateral lower extremity edema and nausea and vomiting almost daily.  She has had Zofran and Phenergan at home prescribed by her oncologist with minimal relief.  She states she was diagnosed with small cell lung cancer in December 2019.  She has gone through multiple rounds of immunotherapy most recently finishing Opdivo 1 month ago.  She states for the past several days she has had difficulty starting her urine stream and little bit of dysuria.  She had normal outpatient urinalysis without evidence of UTI 4 days ago.  She denies chest pain or shortness of breath.  She states she is having difficulty tolerating anything p.o. at home due to the constant nausea.  She is not currently on any antidiuretic therapy.          Review of Systems   Constitutional: Negative.    HENT: Negative.    Eyes: Negative.    Respiratory: Negative.  Negative for shortness of breath.    Cardiovascular: Positive for leg swelling. Negative for chest pain.   Gastrointestinal: Positive for nausea and vomiting.   Genitourinary: Positive for difficulty urinating.   Musculoskeletal: Negative.    Skin: Negative.    Neurological: Negative.    Psychiatric/Behavioral: Negative.        Past Medical History:   Diagnosis Date   • Arthritis    • Body piercing     both ears   • Cancer (CMS/HCC)     lung   • Colitis    • COPD (chronic obstructive pulmonary disease) (CMS/HCC)    • Diabetes mellitus (CMS/HCC)    • Elevated cholesterol    • Full dentures    • Hyperlipidemia    • Hypertension    • Hypothyroidism    • PONV (postoperative nausea and vomiting)    • Wears glasses     for dentures       Allergies   Allergen Reactions   • Erythromycin Itching   • Penicillins Unknown - Low Severity     Report unknown reaction in childhood. Has taken ampicillin without difficulty.         Past Surgical History:    Procedure Laterality Date   • CARPAL TUNNEL RELEASE Left    • CERVICAL LYMPH NODE BIOPSY/EXCISION Left 12/2/2019    Procedure: SUPRACLAVICULAR LYMPH NODE BIOPSY/EXCISION;  Surgeon: Bebe Owen MD;  Location: [facility];  Service: General   • COLONOSCOPY  2014   • HAND SURGERY  02/2018   • KNEE ARTHROSCOPY     • LAPAROSCOPIC TUBAL LIGATION     • MOUTH SURGERY      full extraction of teeth   • PORTACATH PLACEMENT N/A 1/3/2020    Procedure: INSERTION OF PORTACATH WITH ULTRASOUND AND FLUOROSCOPIC GUIDANCE;  Surgeon: Bebe Owen MD;  Location: [facility];  Service: General       Family History   Problem Relation Age of Onset   • Arthritis Mother    • Hypertension Mother    • Hyperlipidemia Mother    • Lung cancer Father    • Cancer Father    • Diabetes Sister    • Arthritis Maternal Grandmother    • Stroke Maternal Grandmother    • Diabetes Brother        Social History     Socioeconomic History   • Marital status:      Spouse name: Not on file   • Number of children: Not on file   • Years of education: Not on file   • Highest education level: Not on file   Tobacco Use   • Smoking status: Current Every Day Smoker     Packs/day: 0.25     Years: 50.00     Pack years: 12.50     Types: Cigarettes   • Smokeless tobacco: Never Used   Substance and Sexual Activity   • Alcohol use: No     Frequency: Never   • Drug use: No   • Sexual activity: Defer           Objective   Physical Exam  Vitals signs and nursing note reviewed.   Constitutional:       General: She is not in acute distress.     Appearance: Normal appearance. She is normal weight. She is not ill-appearing, toxic-appearing or diaphoretic.   HENT:      Head: Normocephalic and atraumatic.      Nose: Nose normal.   Eyes:      Extraocular Movements: Extraocular movements intact.   Neck:      Musculoskeletal: Normal range of motion.   Cardiovascular:      Rate and Rhythm: Normal rate and regular rhythm.      Pulses: Normal pulses.   Pulmonary:       Effort: Pulmonary effort is normal. No respiratory distress.      Breath sounds: Normal breath sounds. No stridor. No wheezing, rhonchi or rales.   Abdominal:      Palpations: Abdomen is soft.      Tenderness: There is no abdominal tenderness. There is no guarding or rebound.   Musculoskeletal:      Comments: Edema to the bilateral shins and ankles and feet   Skin:     General: Skin is warm and dry.   Neurological:      General: No focal deficit present.      Mental Status: She is alert.   Psychiatric:         Mood and Affect: Mood normal.         Behavior: Behavior normal.         Procedures           ED Course  ED Course as of Nov 14 2336   Sat Nov 14, 2020 2212 EKG interpreted by me reveals sinus rhythm rate of 90.  No ectopy no ischemic changes normal KG.    [PF]      ED Course User Index  [PF] Sami Psatrana, DO                                           MDM   Noted small right pleural effusion.  Case labs management discussed with Dr. Pastrana.  Will give short course of Lasix and to follow-up with oncology by calling Monday.  She has an infusion scheduled for normal saline Tuesday we will have her call Dr. Cao's office to request basic labs added to her visit Tuesday.   Final diagnoses:   Bilateral lower extremity edema            Mariano Elias PA-C  11/14/20 [id number]

## 2020-11-16 ENCOUNTER — TELEPHONE (OUTPATIENT)
Dept: ONCOLOGY | Facility: CLINIC | Age: 70
End: 2020-11-16

## 2020-11-16 NOTE — TELEPHONE ENCOUNTER
Patient called triage line to report that she was in ED over the weekend and they started her on lasix for her swelling and she has pleural effusion. She is scheduled for ivf in [place] tomorrow but she said ER doc said to contact Dr. Cao to see if she should get the fluids and that we should check labs.  I talked with Marie SINGH and she will see patient in [facility] at 930 prior to ivf appt.  I called patient back to let her know the plan but had to leave voicemail as she did not answer.

## 2020-11-17 ENCOUNTER — INFUSION (OUTPATIENT)
Dept: ONCOLOGY | Facility: HOSPITAL | Age: 70
End: 2020-11-17

## 2020-11-17 ENCOUNTER — OFFICE VISIT (OUTPATIENT)
Dept: ONCOLOGY | Facility: CLINIC | Age: 70
End: 2020-11-17

## 2020-11-17 VITALS
SYSTOLIC BLOOD PRESSURE: 136 MMHG | HEIGHT: 62 IN | DIASTOLIC BLOOD PRESSURE: 82 MMHG | TEMPERATURE: 97.7 F | BODY MASS INDEX: 18.22 KG/M2 | HEART RATE: 104 BPM | OXYGEN SATURATION: 90 % | RESPIRATION RATE: 12 BRPM | WEIGHT: 99 LBS

## 2020-11-17 DIAGNOSIS — R11.2 INTRACTABLE VOMITING WITH NAUSEA, UNSPECIFIED VOMITING TYPE: ICD-10-CM

## 2020-11-17 DIAGNOSIS — C34.90 SCLC (SMALL CELL LUNG CARCINOMA) (HCC): ICD-10-CM

## 2020-11-17 DIAGNOSIS — C34.90 SCLC (SMALL CELL LUNG CARCINOMA) (HCC): Primary | ICD-10-CM

## 2020-11-17 LAB
ALBUMIN SERPL-MCNC: 2.2 G/DL (ref 3.5–5.2)
ALBUMIN/GLOB SERPL: 0.7 G/DL
ALP SERPL-CCNC: 147 U/L (ref 39–117)
ALT SERPL W P-5'-P-CCNC: 16 U/L (ref 1–33)
ANION GAP SERPL CALCULATED.3IONS-SCNC: 7.9 MMOL/L (ref 5–15)
AST SERPL-CCNC: 15 U/L (ref 1–32)
BASOPHILS # BLD AUTO: 0.05 10*3/MM3 (ref 0–0.2)
BASOPHILS NFR BLD AUTO: 0.5 % (ref 0–1.5)
BILIRUB SERPL-MCNC: 0.3 MG/DL (ref 0–1.2)
BUN SERPL-MCNC: 21 MG/DL (ref 8–23)
BUN/CREAT SERPL: 26.6 (ref 7–25)
CALCIUM SPEC-SCNC: 7.6 MG/DL (ref 8.6–10.5)
CHLORIDE SERPL-SCNC: 97 MMOL/L (ref 98–107)
CO2 SERPL-SCNC: 26.1 MMOL/L (ref 22–29)
CREAT SERPL-MCNC: 0.79 MG/DL (ref 0.57–1)
DEPRECATED RDW RBC AUTO: 55.8 FL (ref 37–54)
EOSINOPHIL # BLD AUTO: 0.01 10*3/MM3 (ref 0–0.4)
EOSINOPHIL NFR BLD AUTO: 0.1 % (ref 0.3–6.2)
ERYTHROCYTE [DISTWIDTH] IN BLOOD BY AUTOMATED COUNT: 17.2 % (ref 12.3–15.4)
GFR SERPL CREATININE-BSD FRML MDRD: 72 ML/MIN/1.73
GLOBULIN UR ELPH-MCNC: 3 GM/DL
GLUCOSE SERPL-MCNC: 82 MG/DL (ref 65–99)
HCT VFR BLD AUTO: 34.8 % (ref 34–46.6)
HGB BLD-MCNC: 11.5 G/DL (ref 12–15.9)
IMM GRANULOCYTES # BLD AUTO: 0.08 10*3/MM3 (ref 0–0.05)
IMM GRANULOCYTES NFR BLD AUTO: 0.7 % (ref 0–0.5)
LYMPHOCYTES # BLD AUTO: 3.16 10*3/MM3 (ref 0.7–3.1)
LYMPHOCYTES NFR BLD AUTO: 28.7 % (ref 19.6–45.3)
MCH RBC QN AUTO: 29.6 PG (ref 26.6–33)
MCHC RBC AUTO-ENTMCNC: 33 G/DL (ref 31.5–35.7)
MCV RBC AUTO: 89.7 FL (ref 79–97)
MONOCYTES # BLD AUTO: 1.37 10*3/MM3 (ref 0.1–0.9)
MONOCYTES NFR BLD AUTO: 12.4 % (ref 5–12)
NEUTROPHILS NFR BLD AUTO: 57.6 % (ref 42.7–76)
NEUTROPHILS NFR BLD AUTO: 6.35 10*3/MM3 (ref 1.7–7)
NRBC BLD AUTO-RTO: 0 /100 WBC (ref 0–0.2)
PLATELET # BLD AUTO: 275 10*3/MM3 (ref 140–450)
PMV BLD AUTO: 8.6 FL (ref 6–12)
POTASSIUM SERPL-SCNC: 3.8 MMOL/L (ref 3.5–5.2)
PROT SERPL-MCNC: 5.2 G/DL (ref 6–8.5)
RBC # BLD AUTO: 3.88 10*6/MM3 (ref 3.77–5.28)
SODIUM SERPL-SCNC: 131 MMOL/L (ref 136–145)
WBC # BLD AUTO: 11.02 10*3/MM3 (ref 3.4–10.8)

## 2020-11-17 PROCEDURE — 36591 DRAW BLOOD OFF VENOUS DEVICE: CPT

## 2020-11-17 PROCEDURE — 36415 COLL VENOUS BLD VENIPUNCTURE: CPT

## 2020-11-17 PROCEDURE — 80053 COMPREHEN METABOLIC PANEL: CPT

## 2020-11-17 PROCEDURE — 85025 COMPLETE CBC W/AUTO DIFF WBC: CPT

## 2020-11-17 PROCEDURE — 99214 OFFICE O/P EST MOD 30 MIN: CPT | Performed by: NURSE PRACTITIONER

## 2020-11-17 PROCEDURE — 25010000003 HEPARIN LOCK FLUSH PER 10 UNITS: Performed by: NURSE PRACTITIONER

## 2020-11-17 RX ORDER — HEPARIN SODIUM (PORCINE) LOCK FLUSH IV SOLN 100 UNIT/ML 100 UNIT/ML
500 SOLUTION INTRAVENOUS EVERY 8 HOURS PRN
Status: DISPENSED | OUTPATIENT
Start: 2020-11-17

## 2020-11-17 RX ORDER — METHYLPREDNISOLONE 4 MG/1
TABLET ORAL
Qty: 21 TABLET | Refills: 0 | Status: SHIPPED | OUTPATIENT
Start: 2020-11-17

## 2020-11-17 RX ADMIN — HEPARIN 500 UNITS: 100 SYRINGE at 11:16

## 2020-11-17 NOTE — PROGRESS NOTES
DATE OF VISIT: 11/17/2020    REASON FOR VISIT: Followup for extensive stage small cell lung cancer     HISTORY OF PRESENT ILLNESS: The patient is a very pleasant 70 y.o. female with past medical history significant for extensive stage small cell lung cancer diagnosed December 2, 2019.  She was started on palliative treatment with carboplatin and etoposide and Tecentriq December 15, 2019. Tecentriq maintenance started 03/11/2020.  The patient had progressive disease and she was switched to Opdivo plus Yervoy.  She completed 4 cycles August 13, 2020.  The patient is here today for acute visit.     SUBJECTIVE: Filippo is here today with her sister.  She has had worsening nausea since treatment. She has had nausea and vomiting every day since last treatment. Thursday she became extremely weak and was unable to even stand to take a shower. It progressively got worse and she ended up in the ED on 11/14. She was diagnosed with edema and received lasix in ED then lasix at home. Her edema in her feet has improved. She has lost 6lbs since last visit. She has lost over 22lbs since end of October. She has also had trouble urinating. She only urinates 2 times a day. She is able to eat, but she vomits everything she eats. She has seemed to get better the past day to day and half. She was able to keep down toast and coffee this morning. She started a protein drink yesterday she was able to keep down.   No fevers.No active infections.         PAST MEDICAL HISTORY/SOCIAL HISTORY/FAMILY HISTORY: Reviewed by me and unchanged from my documentation done on 11/17/20.    Review of Systems   Constitutional: Positive for fatigue. Negative for activity change, appetite change, chills, fever and unexpected weight change.   HENT: Negative for hearing loss, mouth sores, nosebleeds, sore throat and trouble swallowing.    Eyes: Negative for visual disturbance.   Respiratory: Negative for cough, chest tightness, shortness of breath and wheezing.     Cardiovascular: Negative for chest pain and palpitations.   Gastrointestinal: Positive for constipation, nausea and vomiting. Negative for abdominal distention, abdominal pain, blood in stool, diarrhea and rectal pain.   Endocrine: Negative for cold intolerance and heat intolerance.   Genitourinary: Negative for difficulty urinating, dysuria, frequency and urgency.   Musculoskeletal: Negative for arthralgias, back pain, gait problem, joint swelling and myalgias.   Skin: Negative for rash.   Neurological: Negative for dizziness, tremors, syncope, weakness, light-headedness, numbness and headaches.   Hematological: Negative for adenopathy. Does not bruise/bleed easily.   Psychiatric/Behavioral: Negative for confusion, sleep disturbance and suicidal ideas. The patient is not nervous/anxious.          Current Outpatient Medications:   •  acetaminophen (TYLENOL) 500 MG tablet, Take 500 mg by mouth Every 6 (Six) Hours As Needed for Mild Pain ., Disp: , Rfl:   •  albuterol sulfate HFA (Ventolin HFA) 108 (90 Base) MCG/ACT inhaler, Inhale 2 puffs Every 4 (Four) Hours As Needed for Wheezing or Shortness of Air., Disp: 18 g, Rfl: 5  •  amLODIPine (NORVASC) 5 MG tablet, Take 1 tablet by mouth Daily., Disp: 90 tablet, Rfl: 3  •  baclofen (LIORESAL) 10 MG tablet, START- ONE TAB NIGHTLY X7 DAYS, THEN 1 TAB 2X A DAY X7 DAYS, THEN 1 TAB 3X A DAY, Disp: 270 tablet, Rfl: 1  •  Calcium Carbonate-Vitamin D (CALCIUM-CARB 600 + D) 600-125 MG-UNIT tablet, Take 500 mg by mouth 2 (Two) Times a Day., Disp: 60 each, Rfl: 5  •  diclofenac (VOLTAREN) 75 MG EC tablet, Take 1 tablet by mouth Daily As Needed (pain)., Disp: 90 tablet, Rfl: 3  •  docusate sodium (COLACE) 100 MG capsule, Take 1 tablet twice daily, Disp: 60 capsule, Rfl: 3  •  furosemide (LASIX) 20 MG tablet, Take 1 tablet by mouth Daily As Needed (leg swelling) for up to 5 doses., Disp: 5 tablet, Rfl: 0  •  gabapentin (NEURONTIN) 300 MG capsule, Take 1 capsule by mouth Daily With  Breakfast & Lunch AND 2 capsules Every Night., Disp: 360 capsule, Rfl: 0  •  ipratropium-albuterol (DUO-NEB) 0.5-2.5 mg/3 ml nebulizer, Take 3 mL by nebulization 4 (Four) Times a Day As Needed for Wheezing or Shortness of Air., Disp: 120 mL, Rfl: 5  •  levothyroxine (SYNTHROID, LEVOTHROID) 75 MCG tablet, Take 1 tablet by mouth Daily., Disp: 30 tablet, Rfl: 3  •  lidocaine (LIDODERM) 5 %, Place 1 patch on the skin as directed by provider Daily. Remove & Discard patch within 12 hours or as directed by MD, Disp: 90 each, Rfl: 3  •  Lidocaine Viscous HCl (XYLOCAINE) 2 % solution, , Disp: , Rfl:   •  lidocaine-prilocaine (EMLA) 2.5-2.5 % cream, Apply  topically to the appropriate area as directed As Needed (45-60 minutes prior to port access.  Cover with saran/plastic wrap.)., Disp: 30 g, Rfl: 3  •  LORazepam (Ativan) 1 MG tablet, Take 1 tablet by mouth Every 8 (Eight) Hours As Needed for Anxiety., Disp: 90 tablet, Rfl: 2  •  magic mouthwash oral suspension, Swish and spit (or swallow) 1-2 Teaspoonfuls (5-10ml) 4 times a day as needed, Disp: 240 mL, Rfl: 3  •  Nebulizer device, 1 Device Take As Directed., Disp: 1 each, Rfl: 0  •  OLANZapine (zyPREXA) 5 MG tablet, Take 1 tablet by mouth Every Night., Disp: 30 tablet, Rfl: 5  •  omeprazole (priLOSEC) 40 MG capsule, Take 1 capsule by mouth Daily., Disp: 90 capsule, Rfl: 2  •  ondansetron ODT (ZOFRAN-ODT) 8 MG disintegrating tablet, PLACE 1 TABLET ON THE TONGUE EVERY 8 (EIGHT) HOURS AS NEEDED FOR NAUSEA OR VOMITING., Disp: 30 tablet, Rfl: 4  •  pravastatin (PRAVACHOL) 40 MG tablet, Take 1 tablet by mouth Daily., Disp: 90 tablet, Rfl: 3  •  prochlorperazine (COMPAZINE) 10 MG tablet, Take 1 tablet by mouth Every 6 (Six) Hours As Needed for Nausea or Vomiting., Disp: 60 tablet, Rfl: 5  •  Stiolto Respimat 2.5-2.5 MCG/ACT aerosol solution inhaler, INHALE 2 PUFFS BY MOUTH DAILY, Disp: 1 inhaler, Rfl: 1  •  traMADol (ULTRAM) 50 MG tablet, TAKE 1 TABLET BY MOUTH THREE TIMES A DAY,  "Disp: 270 tablet, Rfl: 1    PHYSICAL EXAMINATION:   /82   Pulse 104   Temp 97.7 °F (36.5 °C) (Temporal)   Resp 12   Ht 157.5 cm (62\")   Wt 44.9 kg (99 lb)   SpO2 90%   BMI 18.11 kg/m²    ECOG Performance Status: 1 - Symptomatic but completely ambulatory  General Appearance:  alert, cooperative, no apparent distress, appears stated age and normal weight   Neurologic/Psychiatric: A&O x 3, gait steady, appropriate affect, strength 5/5 in all muscle groups   HEENT:  Normocephalic, without obvious abnormality, mucous membranes moist   Neck: Supple, symmetrical, trachea midline, no adenopathy;  No thyromegaly, masses, or tenderness   Lungs:   Clear to auscultation bilaterally; respirations regular, even, and unlabored bilaterally   Heart:  Regular rate and rhythm, no murmurs appreciated   Abdomen:   Soft, non-tender, non-distended and no organomegaly   Lymph nodes: No cervical, supraclavicular, inguinal or axillary adenopathy noted.  Submandibular nodule noted approximately 1cm   Extremities: Normal, atraumatic; no clubbing, cyanosis, or edema    Skin: No rashes, ulcers, or suspicious lesions noted     Admission on 11/14/2020, Discharged on 11/15/2020   Component Date Value Ref Range Status   • Glucose 11/14/2020 105* 65 - 99 mg/dL Final   • BUN 11/14/2020 18  8 - 23 mg/dL Final   • Creatinine 11/14/2020 1.03* 0.57 - 1.00 mg/dL Final   • Sodium 11/14/2020 130* 136 - 145 mmol/L Final   • Potassium 11/14/2020 4.1  3.5 - 5.2 mmol/L Final   • Chloride 11/14/2020 100  98 - 107 mmol/L Final   • CO2 11/14/2020 20.6* 22.0 - 29.0 mmol/L Final   • Calcium 11/14/2020 8.0* 8.6 - 10.5 mg/dL Final   • Total Protein 11/14/2020 5.5* 6.0 - 8.5 g/dL Final   • Albumin 11/14/2020 2.40* 3.50 - 5.20 g/dL Final   • ALT (SGPT) 11/14/2020 15  1 - 33 U/L Final   • AST (SGOT) 11/14/2020 20  1 - 32 U/L Final   • Alkaline Phosphatase 11/14/2020 157* 39 - 117 U/L Final   • Total Bilirubin 11/14/2020 0.3  0.0 - 1.2 mg/dL Final   • eGFR Non "  Amer 11/14/2020 53* >60 mL/min/1.73 Final   • Globulin 11/14/2020 3.1  gm/dL Final   • A/G Ratio 11/14/2020 0.8  g/dL Final   • BUN/Creatinine Ratio 11/14/2020 17.5  7.0 - 25.0 Final   • Anion Gap 11/14/2020 9.4  5.0 - 15.0 mmol/L Final   • Lipase 11/14/2020 15  13 - 60 U/L Final   • Troponin T 11/14/2020 <0.010  0.000 - 0.030 ng/mL Final   • proBNP 11/14/2020 354.9  0.0 - 900.0 pg/mL Final   • WBC 11/14/2020 7.84  3.40 - 10.80 10*3/mm3 Final   • RBC 11/14/2020 3.78  3.77 - 5.28 10*6/mm3 Final   • Hemoglobin 11/14/2020 11.3* 12.0 - 15.9 g/dL Final   • Hematocrit 11/14/2020 33.9* 34.0 - 46.6 % Final   • MCV 11/14/2020 89.7  79.0 - 97.0 fL Final   • MCH 11/14/2020 29.9  26.6 - 33.0 pg Final   • MCHC 11/14/2020 33.3  31.5 - 35.7 g/dL Final   • RDW 11/14/2020 16.9* 12.3 - 15.4 % Final   • RDW-SD 11/14/2020 55.3* 37.0 - 54.0 fl Final   • MPV 11/14/2020 8.5  6.0 - 12.0 fL Final   • Platelets 11/14/2020 256  140 - 450 10*3/mm3 Final   • Neutrophil % 11/14/2020 52.3  42.7 - 76.0 % Final   • Lymphocyte % 11/14/2020 33.3  19.6 - 45.3 % Final   • Monocyte % 11/14/2020 12.6* 5.0 - 12.0 % Final   • Eosinophil % 11/14/2020 0.1* 0.3 - 6.2 % Final   • Basophil % 11/14/2020 0.6  0.0 - 1.5 % Final   • Immature Grans % 11/14/2020 1.1* 0.0 - 0.5 % Final   • Neutrophils, Absolute 11/14/2020 4.09  1.70 - 7.00 10*3/mm3 Final   • Lymphocytes, Absolute 11/14/2020 2.61  0.70 - 3.10 10*3/mm3 Final   • Monocytes, Absolute 11/14/2020 0.99* 0.10 - 0.90 10*3/mm3 Final   • Eosinophils, Absolute 11/14/2020 0.01  0.00 - 0.40 10*3/mm3 Final   • Basophils, Absolute 11/14/2020 0.05  0.00 - 0.20 10*3/mm3 Final   • Immature Grans, Absolute 11/14/2020 0.09* 0.00 - 0.05 10*3/mm3 Final   • nRBC 11/14/2020 0.0  0.0 - 0.2 /100 WBC Final   Infusion on 11/10/2020   Component Date Value Ref Range Status   • Color, UA 11/10/2020 Yellow  Yellow, Straw Final   • Appearance, UA 11/10/2020 Clear  Clear Final   • pH, UA 11/10/2020 <=5.0  5.0 - 8.0 Final   •  Specific [place], UA 11/10/2020 1.007  1.005 - 1.030 Final   • Glucose, UA 11/10/2020 Negative  Negative Final   • Ketones, UA 11/10/2020 Negative  Negative Final   • Bilirubin, UA 11/10/2020 Negative  Negative Final   • Blood, UA 11/10/2020 Negative  Negative Final   • Protein, UA 11/10/2020 Negative  Negative Final   • Leuk Esterase, UA 11/10/2020 Negative  Negative Final   • Nitrite, UA 11/10/2020 Negative  Negative Final   • Urobilinogen, UA 11/10/2020 1.0 E.U./dL  0.2 - 1.0 E.U./dL Final   • RBC, UA 11/10/2020 None Seen  None Seen /HPF Final   • WBC, UA 11/10/2020 None Seen  None Seen /HPF Final   • Bacteria, UA 11/10/2020 None Seen  None Seen /HPF Final   • Squamous Epithelial Cells, UA 11/10/2020 0-2  None Seen, 0-2 /HPF Final   • Hyaline Casts, UA 11/10/2020 None Seen  None Seen /LPF Final   • Methodology 11/10/2020 Manual Light Microscopy   Final      Xr Chest 1 View    Result Date: 11/15/2020  Narrative: PORTABLE CHEST    11/14/2020 9:53 PM  HISTORY: Bilateral lower extremity edema  COMPARISON: CT chest 08/31/2020.  FINDINGS: Normal heart size. Bibasilar opacities could be atelectasis and/or pneumonia. Background COPD. No effusion or pneumothorax. Left Port-A-Cath, tip SVC.      Impression: Bibasilar opacities, atelectasis versus pneumonia.  This report was finalized on 11/15/2020 6:51 AM by Dr Diego Michelle DO.  (    ASSESSMENT: The patient is a very pleasant 70 y.o. female  with right upper lobe extensive stage small cell lung cancer     PROBLEM LIST:   1.  Right upper lobe extensive stage small cell lung cancer, [id number] stage IVb:  A.  Presented with right lower ribs pain  B.  CT chest revealed 3.5 cm right upper lobe lung mass, right adrenal nodule, mediastinal adenopathy, and left cervical lymphadenopathy.  C.  Status post left cervical lymph node biopsy done by Dr. Owen December 2, 2019  D.  Pathology consistent with small cell carcinoma  E.  Started palliative treatment with carboplatin and etoposide  and Tecentriq December 15, 2019, status post 8 cycles  F.  Progressive disease documented CT scans done on June 1, 2020 with a new cervical lymphadenopathy and relapsed right adrenal mass  G.  Started Opdivo plus Yervoy Brenda 10, 2020, status post 4 cycle  2.  COPD  3.  Hypertension  4.  Hypercholesterolemia  5.  Hypothyroid  6.  GERD  7.  Progressive nausea and vomiting    PLAN:  1. We will check labs today. We will hold off on Fluids until labs have resulted.   2. Nausea has improved somewhat. I will continue the patient on Zofran ODT as needed as well as phenergan daily as for chemotherapy-induced nausea.  We will stop Zyprexa due to side effects. We will plan to add Ativan q 8 hours prn for nausea.   3. We will start her on medrol dose pack today. I am concerned she has an immune mediated gastritis.   3.  We will check Ct scans prior to return to rule out metastases. We will also check an MRI prior to return.   4. We will plan to hold maintenance Opdivo cycle #3, this is in compliance with NCCN guidelines next week.  We will tentatively plan to treat her after Thanksgiving.   6. The patient will follow up as scheduled.   7.  I will continue to monitor the patient blood work including blood counts kidney function liver function electrolytes function.  8. We will continue omeprazole for GERD.     Kathe Higuera, APRN  11/17/2020

## 2020-11-20 ENCOUNTER — TELEPHONE (OUTPATIENT)
Dept: ONCOLOGY | Facility: CLINIC | Age: 70
End: 2020-11-20

## 2020-12-02 ENCOUNTER — OFFICE VISIT (OUTPATIENT)
Dept: INTERNAL MEDICINE | Facility: CLINIC | Age: 70
End: 2020-12-02

## 2020-12-02 DIAGNOSIS — R79.9 ABNORMAL FINDING OF BLOOD CHEMISTRY, UNSPECIFIED: ICD-10-CM

## 2020-12-02 DIAGNOSIS — G63 NEUROPATHY ASSOCIATED WITH CANCER (HCC): ICD-10-CM

## 2020-12-02 DIAGNOSIS — J43.2 CENTRILOBULAR EMPHYSEMA (HCC): ICD-10-CM

## 2020-12-02 DIAGNOSIS — M25.50 CHRONIC JOINT PAIN: ICD-10-CM

## 2020-12-02 DIAGNOSIS — Z11.59 NEED FOR HEPATITIS C SCREENING TEST: ICD-10-CM

## 2020-12-02 DIAGNOSIS — G89.29 CHRONIC JOINT PAIN: ICD-10-CM

## 2020-12-02 DIAGNOSIS — Z00.00 MEDICARE ANNUAL WELLNESS VISIT, SUBSEQUENT: Primary | ICD-10-CM

## 2020-12-02 DIAGNOSIS — R11.10 POSTPRANDIAL VOMITING: ICD-10-CM

## 2020-12-02 DIAGNOSIS — R11.2 INTRACTABLE VOMITING WITH NAUSEA, UNSPECIFIED VOMITING TYPE: ICD-10-CM

## 2020-12-02 DIAGNOSIS — E03.9 ACQUIRED HYPOTHYROIDISM: ICD-10-CM

## 2020-12-02 DIAGNOSIS — C80.1 NEUROPATHY ASSOCIATED WITH CANCER (HCC): ICD-10-CM

## 2020-12-02 DIAGNOSIS — E78.2 MIXED HYPERLIPIDEMIA: ICD-10-CM

## 2020-12-02 DIAGNOSIS — E74.39 GLUCOSE INTOLERANCE: ICD-10-CM

## 2020-12-02 DIAGNOSIS — C34.90 SCLC (SMALL CELL LUNG CARCINOMA) (HCC): ICD-10-CM

## 2020-12-02 DIAGNOSIS — I10 ESSENTIAL HYPERTENSION: ICD-10-CM

## 2020-12-02 PROCEDURE — G0439 PPPS, SUBSEQ VISIT: HCPCS | Performed by: FAMILY MEDICINE

## 2020-12-02 PROCEDURE — 1170F FXNL STATUS ASSESSED: CPT | Performed by: FAMILY MEDICINE

## 2020-12-02 PROCEDURE — 1125F AMNT PAIN NOTED PAIN PRSNT: CPT | Performed by: FAMILY MEDICINE

## 2020-12-02 RX ORDER — TRAMADOL HYDROCHLORIDE 50 MG/1
50 TABLET ORAL EVERY 8 HOURS PRN
Qty: 270 TABLET | Refills: 1 | Status: SHIPPED | OUTPATIENT
Start: 2020-12-02

## 2020-12-02 RX ORDER — GABAPENTIN 400 MG/1
CAPSULE ORAL
Qty: 270 CAPSULE | Refills: 1 | Status: SHIPPED | OUTPATIENT
Start: 2020-12-02 | End: 2021-02-26 | Stop reason: SDUPTHER

## 2020-12-02 NOTE — PROGRESS NOTES
The ABCs of the Annual Wellness Visit  Subsequent Medicare Wellness Visit    Chief Complaint   Patient presents with   • Medicare Wellness-subsequent   • Hypothyroidism     Pt states SAMANTHA Samuels at Dr. cao's office told her we were not managing her Thyroid right because her labs were way off. So according to the pt, Kathe SAE HigueraKAMRAN is not going to take control of caring for her thyroid, labs, and medication.      Patient is not able to connect via video for her visit today so visit converted to telephone only.   This visit has been rescheduled as a phone visit to comply with patient safety concerns in accordance with CDC recommendations. Total time of discussion was 18 minutes.       Subjective   History of Present Illness:  Prashanth Prajapati is a 70 y.o. female who presents for a Subsequent Medicare Wellness Visit.    Followed by heme/onc for lung cancer. Has follow up visit and scans scheduled.     Followed by pulm for COPD.     Pt feels she needs an upper scope. Notes bouts of nausea, worst after eating.   Gained 5 lb since moving in with different family member but being fed more.     Continues to struggle with neuropathy and chronic joint pain. Tramadol and gabapentin help.    Continues antihypertensives for hypertension, levothyroxine for acquired hypothyroidism, statin for hyperlipidemia.    HEALTH RISK ASSESSMENT    Recent Hospitalizations:  No hospitalization(s) within the last year.    Current Medical Providers:  Patient Care Team:  Hattie Askew MD as PCP - General (Family Medicine)  Bebe Owen MD as Surgeon (General Surgery)  Rupesh Cao MD as Consulting Physician (Hematology and Oncology)  Stefany Olvera MD as Consulting Physician (Pulmonary Disease)    Smoking Status:  Social History     Tobacco Use   Smoking Status Current Every Day Smoker   • Packs/day: 0.25   • Years: 50.00   • Pack years: 12.50   • Types: Cigarettes   Smokeless Tobacco Never Used       Alcohol  Consumption:  Social History     Substance and Sexual Activity   Alcohol Use No   • Frequency: Never       Depression Screen:   PHQ-2/PHQ-9 Depression Screening 12/2/2020   Little interest or pleasure in doing things 0   Feeling down, depressed, or hopeless 0   Total Score 0       Fall Risk Screen:  ANJEL Fall Risk Assessment has not been completed.    Health Habits and Functional and Cognitive Screening:  Functional & Cognitive Status 12/2/2020   Do you have difficulty preparing food and eating? No   Do you have difficulty bathing yourself, getting dressed or grooming yourself? No   Do you have difficulty using the toilet? No   Do you have difficulty moving around from place to place? No   Do you have trouble with steps or getting out of a bed or a chair? No   Current Diet Well Balanced Diet   Dental Exam Not up to date        Dental Exam Comment -   Eye Exam Not up to date   Exercise (times per week) 5 times per week   Current Exercise Activities Include Cardiovasular Workout on Exercise Equipment   Do you need help using the phone?  No   Are you deaf or do you have serious difficulty hearing?  No   Do you need help with transportation? No   Do you need help shopping? Yes   Do you need help preparing meals?  No   Do you need help with housework?  No   Do you need help with laundry? No   Do you need help taking your medications? No   Do you need help managing money? No   Do you ever drive or ride in a car without wearing a seat belt? No   Have you felt unusual stress, anger or loneliness in the last month? No   Who do you live with? Sibling   If you need help, do you have trouble finding someone available to you? No   Have you been bothered in the last four weeks by sexual problems? No   Do you have difficulty concentrating, remembering or making decisions? No         Does the patient have evidence of cognitive impairment? No    Asprin use counseling:Does not need ASA (and currently is not on  it)    Age-appropriate Screening Schedule:  Refer to the list below for future screening recommendations based on patient's age, sex and/or medical conditions. Orders for these recommended tests are listed in the plan section. The patient has been provided with a written plan.    Health Maintenance   Topic Date Due   • URINE MICROALBUMIN  1950   • DIABETIC FOOT EXAM  10/31/2019   • LIPID PANEL  10/31/2019   • HEMOGLOBIN A1C  10/31/2019   • DIABETIC EYE EXAM  10/31/2019   • INFLUENZA VACCINE  08/01/2020   • MAMMOGRAM  01/01/2021 (Originally 1950)   • ZOSTER VACCINE (1 of 2) 01/04/2021 (Originally 11/16/2002)   • TDAP/TD VACCINES (1 - Tdap) 08/01/2029 (Originally 11/16/1971)   • COLONOSCOPY  01/01/2023          The following portions of the patient's history were reviewed and updated as appropriate: allergies, current medications, past family history, past medical history, past social history, past surgical history and problem list.    Outpatient Medications Prior to Visit   Medication Sig Dispense Refill   • acetaminophen (TYLENOL) 500 MG tablet Take 500 mg by mouth Every 6 (Six) Hours As Needed for Mild Pain .     • albuterol sulfate HFA (Ventolin HFA) 108 (90 Base) MCG/ACT inhaler Inhale 2 puffs Every 4 (Four) Hours As Needed for Wheezing or Shortness of Air. 18 g 5   • amLODIPine (NORVASC) 5 MG tablet Take 1 tablet by mouth Daily. 90 tablet 3   • baclofen (LIORESAL) 10 MG tablet START- ONE TAB NIGHTLY X7 DAYS, THEN 1 TAB 2X A DAY X7 DAYS, THEN 1 TAB 3X A  tablet 1   • Calcium Carbonate-Vitamin D (CALCIUM-CARB 600 + D) 600-125 MG-UNIT tablet Take 500 mg by mouth 2 (Two) Times a Day. 60 each 5   • diclofenac (VOLTAREN) 75 MG EC tablet Take 1 tablet by mouth Daily As Needed (pain). 90 tablet 3   • docusate sodium (COLACE) 100 MG capsule Take 1 tablet twice daily 60 capsule 3   • furosemide (LASIX) 20 MG tablet Take 1 tablet by mouth Daily As Needed (leg swelling) for up to 5 doses. 5 tablet 0   •  ipratropium-albuterol (DUO-NEB) 0.5-2.5 mg/3 ml nebulizer Take 3 mL by nebulization 4 (Four) Times a Day As Needed for Wheezing or Shortness of Air. 120 mL 5   • levothyroxine (SYNTHROID, LEVOTHROID) 75 MCG tablet Take 1 tablet by mouth Daily. 30 tablet 3   • lidocaine (LIDODERM) 5 % Place 1 patch on the skin as directed by provider Daily. Remove & Discard patch within 12 hours or as directed by MD 90 each 3   • Lidocaine Viscous HCl (XYLOCAINE) 2 % solution      • lidocaine-prilocaine (EMLA) 2.5-2.5 % cream Apply  topically to the appropriate area as directed As Needed (45-60 minutes prior to port access.  Cover with saran/plastic wrap.). 30 g 3   • LORazepam (Ativan) 1 MG tablet Take 1 tablet by mouth Every 8 (Eight) Hours As Needed for Anxiety. 90 tablet 2   • magic mouthwash oral suspension Swish and spit (or swallow) 1-2 Teaspoonfuls (5-10ml) 4 times a day as needed 240 mL 3   • methylPREDNISolone (MEDROL) 4 MG dose pack Take as directed on package instructions. 21 tablet 0   • Nebulizer device 1 Device Take As Directed. 1 each 0   • OLANZapine (zyPREXA) 5 MG tablet Take 1 tablet by mouth Every Night. 30 tablet 5   • omeprazole (priLOSEC) 40 MG capsule Take 1 capsule by mouth Daily. 90 capsule 2   • ondansetron ODT (ZOFRAN-ODT) 8 MG disintegrating tablet PLACE 1 TABLET ON THE TONGUE EVERY 8 (EIGHT) HOURS AS NEEDED FOR NAUSEA OR VOMITING. 30 tablet 4   • pravastatin (PRAVACHOL) 40 MG tablet Take 1 tablet by mouth Daily. 90 tablet 3   • prochlorperazine (COMPAZINE) 10 MG tablet Take 1 tablet by mouth Every 6 (Six) Hours As Needed for Nausea or Vomiting. 60 tablet 5   • Stiolto Respimat 2.5-2.5 MCG/ACT aerosol solution inhaler INHALE 2 PUFFS BY MOUTH DAILY 1 inhaler 1   • gabapentin (NEURONTIN) 300 MG capsule Take 1 capsule by mouth Daily With Breakfast & Lunch AND 2 capsules Every Night. 360 capsule 0   • traMADol (ULTRAM) 50 MG tablet TAKE 1 TABLET BY MOUTH THREE TIMES A  tablet 1      Facility-Administered Medications Prior to Visit   Medication Dose Route Frequency Provider Last Rate Last Admin   • heparin injection 500 Units  500 Units Intracatheter Q8H PRN Kathe Higuera, APRN   500 Units at 11/17/20 1116       Patient Active Problem List   Diagnosis   • Acquired hypothyroidism   • Centrilobular emphysema (CMS/HCC)   • Glucose intolerance   • Essential hypertension   • Mixed hyperlipidemia   • Lymphadenopathy, supraclavicular   • SCLC (small cell lung carcinoma) (CMS/HCC)   • Neuropathy associated with cancer (CMS/HCC)       Advanced Care Planning:  ACP discussion was held with the patient during this visit. Patient does not have an advance directive, declines further assistance.    Review of Systems   Constitutional: Positive for fatigue. Negative for fever.   Gastrointestinal: Positive for abdominal pain, nausea and vomiting.   Musculoskeletal: Positive for arthralgias and back pain.       Compared to one year ago, the patient feels her physical health is worse.  Compared to one year ago, the patient feels her mental health is the same.    Reviewed chart for potential of high risk medication in the elderly: yes  Reviewed chart for potential of harmful drug interactions in the elderly:yes    Objective         Vitals:    12/02/20 1525   PainSc:   4       There is no height or weight on file to calculate BMI.  Discussed the patient's BMI with her. The BMI cannot be calculated based on telephone visit and she is undergoing treatment for cancer.    Physical Exam  Nursing note reviewed.   Neck:      Trachea: Phonation normal.   Pulmonary:      Effort: Pulmonary effort is normal.      Breath sounds: No stridor.   Neurological:      Mental Status: She is alert and oriented to person, place, and time.   Psychiatric:         Attention and Perception: Attention and perception normal.         Mood and Affect: Mood and affect normal.         Speech: Speech normal.         Behavior: Behavior is  cooperative.         Thought Content: Thought content normal.         Cognition and Memory: Cognition normal.         Judgment: Judgment normal.               Assessment/Plan   Medicare Risks and Personalized Health Plan  CMS Preventative Services Quick Reference  Advance Directive Discussion  Breast Cancer/Mammogram Screening  Chronic Pain   Dementia/Memory   Depression/Dysphoria  Immunizations Discussed/Encouraged (specific immunizations; Influenza )  Inactivity/Sedentary  Polypharmacy    The above risks/problems have been discussed with the patient.  Pertinent information has been shared with the patient in the After Visit Summary.  Follow up plans and orders are seen below in the Assessment/Plan Section.    Diagnoses and all orders for this visit:    1. Medicare annual wellness visit, subsequent (Primary)    2. Centrilobular emphysema (CMS/HCC)  -     CBC & Differential    3. SCLC (small cell lung carcinoma) (CMS/[facility])  -     CBC & Differential  -     Comprehensive Metabolic Panel  -     traMADol (ULTRAM) 50 MG tablet; Take 1 tablet by mouth Every 8 (Eight) Hours As Needed for Severe Pain .  Dispense: 270 tablet; Refill: 1    4. Neuropathy associated with cancer (CMS/HCC)  -     gabapentin (NEURONTIN) 400 MG capsule; Take 1 capsule by mouth Daily With Breakfast & Lunch. May also take 2 capsules At Night As Needed (neuropathy, pain).  Dispense: 270 capsule; Refill: 1  -     Vitamin B12  -     Folate    5. Intractable vomiting with nausea, unspecified vomiting type  -     US Gallbladder    6. Postprandial vomiting  -     US Gallbladder    7. Acquired hypothyroidism  -     CBC & Differential  -     TSH+Free T4    8. Chronic joint pain  -     Comprehensive Metabolic Panel  -     traMADol (ULTRAM) 50 MG tablet; Take 1 tablet by mouth Every 8 (Eight) Hours As Needed for Severe Pain .  Dispense: 270 tablet; Refill: 1    9. Essential hypertension  -     Comprehensive Metabolic Panel  -     TSH+Free T4    10. Mixed  hyperlipidemia  -     Comprehensive Metabolic Panel  -     Lipid Panel    11. Glucose intolerance  -     Comprehensive Metabolic Panel  -     Hemoglobin A1c    12. Abnormal finding of blood chemistry, unspecified   -     Hemoglobin A1c    13. Need for hepatitis C screening test  -     Hepatitis C Antibody      Patient has been erroneously marked as diabetic. Based on the available clinical information, she does not have diabetes and should therefore be excluded from diabetic health maintenance and quality measures for the remainder of the reporting period.     SUKHI reviewed and appropriate.      Follow Up:  Return in about 6 months (around 6/2/2021).     An After Visit Summary and PPPS were given to the patient.

## 2020-12-02 NOTE — PATIENT INSTRUCTIONS
Medicare Wellness  Personal Prevention Plan of Service     Date of Office Visit:  2020  Encounter Provider:  Hattie Askew MD  Place of Service:  [facility]  Patient Name: Prashanth Prajapati  :  1950    As part of the Medicare Wellness portion of your visit today, we are providing you with this personalized preventive plan of services (PPPS). This plan is based upon recommendations of the [place] Preventive Services Task Force (USPSTF) and the Advisory Committee on Immunization Practices (ACIP).    This lists the preventive care services that should be considered, and provides dates of when you are due. Items listed as completed are up-to-date and do not require any further intervention.    Health Maintenance   Topic Date Due   • URINE MICROALBUMIN  1950   • HEPATITIS C SCREENING  10/31/2019   • DIABETIC FOOT EXAM  10/31/2019   • LIPID PANEL  10/31/2019   • HEMOGLOBIN A1C  10/31/2019   • DIABETIC EYE EXAM  10/31/2019   • INFLUENZA VACCINE  2020   • MAMMOGRAM  2021 (Originally 1950)   • ZOSTER VACCINE (1 of 2) 2021 (Originally 2002)   • TDAP/TD VACCINES (1 - Tdap) 2029 (Originally 1971)   • ANNUAL WELLNESS VISIT  2021   • COLONOSCOPY  2023   • Pneumococcal Vaccine 65+  Completed       Orders Placed This Encounter   Procedures   • US Gallbladder     Scheduling Instructions:      Pt is already scheduled for CTs and MRI next week, if possible please do ultrasound same day     Order Specific Question:   Reason for Exam:     Answer:   abdominal pain, nausea/vomiting after eating, has gb   • Hepatitis C Antibody   • Comprehensive Metabolic Panel   • Lipid Panel     Order Specific Question:   [facility] Has the patient fasted?     Answer:   Yes   • TSH+Free T4   • Vitamin B12   • Folate   • Hemoglobin A1c   • CBC & Differential     Order Specific Question:   Manual Differential     Answer:   No       Return  in about 6 months (around 6/2/2021).

## 2020-12-02 NOTE — PROGRESS NOTES
You have chosen to receive care through a telephone visit. Do you consent to use a telephone visit for your medical care today? Yes    On this telephone visit is Milly SYLVESTER CMA, Dr. Askew, and Prashanth Prajapati.

## 2020-12-07 ENCOUNTER — HOSPITAL ENCOUNTER (OUTPATIENT)
Dept: MRI IMAGING | Facility: HOSPITAL | Age: 70
Discharge: HOME | End: 2020-12-07

## 2020-12-07 ENCOUNTER — HOSPITAL ENCOUNTER (OUTPATIENT)
Dept: CT IMAGING | Facility: HOSPITAL | Age: 70
Discharge: HOME | End: 2020-12-07

## 2020-12-07 ENCOUNTER — LAB (OUTPATIENT)
Dept: LAB | Facility: HOSPITAL | Age: 70
End: 2020-12-07

## 2020-12-07 DIAGNOSIS — C34.90 SCLC (SMALL CELL LUNG CARCINOMA) (HCC): ICD-10-CM

## 2020-12-07 DIAGNOSIS — R11.2 INTRACTABLE VOMITING WITH NAUSEA, UNSPECIFIED VOMITING TYPE: ICD-10-CM

## 2020-12-07 DIAGNOSIS — R22.42 LOCALIZED SWELLING, MASS AND LUMP, LEFT LOWER LIMB: ICD-10-CM

## 2020-12-07 DIAGNOSIS — M25.472 LEFT ANKLE SWELLING: Primary | ICD-10-CM

## 2020-12-07 DIAGNOSIS — M25.472 ANKLE SWELLING, LEFT: ICD-10-CM

## 2020-12-07 LAB
ALBUMIN SERPL-MCNC: 2.1 G/DL (ref 3.5–5.2)
ALBUMIN/GLOB SERPL: 0.7 G/DL
ALP SERPL-CCNC: 140 U/L (ref 39–117)
ALT SERPL W P-5'-P-CCNC: 16 U/L (ref 1–33)
ANION GAP SERPL CALCULATED.3IONS-SCNC: 6.9 MMOL/L (ref 5–15)
AST SERPL-CCNC: 18 U/L (ref 1–32)
BASOPHILS # BLD AUTO: 0.05 10*3/MM3 (ref 0–0.2)
BASOPHILS NFR BLD AUTO: 0.5 % (ref 0–1.5)
BILIRUB SERPL-MCNC: 0.2 MG/DL (ref 0–1.2)
BUN SERPL-MCNC: 15 MG/DL (ref 8–23)
BUN/CREAT SERPL: 25.9 (ref 7–25)
CALCIUM SPEC-SCNC: 7.7 MG/DL (ref 8.6–10.5)
CHLORIDE SERPL-SCNC: 98 MMOL/L (ref 98–107)
CHOLEST SERPL-MCNC: 106 MG/DL (ref 0–200)
CO2 SERPL-SCNC: 24.1 MMOL/L (ref 22–29)
CREAT SERPL-MCNC: 0.58 MG/DL (ref 0.57–1)
DEPRECATED RDW RBC AUTO: 69.2 FL (ref 37–54)
EOSINOPHIL # BLD AUTO: 0.01 10*3/MM3 (ref 0–0.4)
EOSINOPHIL NFR BLD AUTO: 0.1 % (ref 0.3–6.2)
ERYTHROCYTE [DISTWIDTH] IN BLOOD BY AUTOMATED COUNT: 19.6 % (ref 12.3–15.4)
FOLATE SERPL-MCNC: 12.4 NG/ML (ref 4.78–24.2)
GFR SERPL CREATININE-BSD FRML MDRD: 103 ML/MIN/1.73
GLOBULIN UR ELPH-MCNC: 3.2 GM/DL
GLUCOSE SERPL-MCNC: 80 MG/DL (ref 65–99)
HBA1C MFR BLD: 5 % (ref 4.8–5.6)
HCT VFR BLD AUTO: 30.9 % (ref 34–46.6)
HDLC SERPL-MCNC: 56 MG/DL (ref 40–60)
HGB BLD-MCNC: 10.1 G/DL (ref 12–15.9)
IMM GRANULOCYTES # BLD AUTO: 0.03 10*3/MM3 (ref 0–0.05)
IMM GRANULOCYTES NFR BLD AUTO: 0.3 % (ref 0–0.5)
LDLC SERPL CALC-MCNC: 33 MG/DL (ref 0–100)
LDLC/HDLC SERPL: 0.58 {RATIO}
LYMPHOCYTES # BLD AUTO: 2.69 10*3/MM3 (ref 0.7–3.1)
LYMPHOCYTES NFR BLD AUTO: 28.4 % (ref 19.6–45.3)
MCH RBC QN AUTO: 31.4 PG (ref 26.6–33)
MCHC RBC AUTO-ENTMCNC: 32.7 G/DL (ref 31.5–35.7)
MCV RBC AUTO: 96 FL (ref 79–97)
MONOCYTES # BLD AUTO: 0.81 10*3/MM3 (ref 0.1–0.9)
MONOCYTES NFR BLD AUTO: 8.6 % (ref 5–12)
NEUTROPHILS NFR BLD AUTO: 5.88 10*3/MM3 (ref 1.7–7)
NEUTROPHILS NFR BLD AUTO: 62.1 % (ref 42.7–76)
NRBC BLD AUTO-RTO: 0 /100 WBC (ref 0–0.2)
PLATELET # BLD AUTO: 297 10*3/MM3 (ref 140–450)
PMV BLD AUTO: 9.3 FL (ref 6–12)
POTASSIUM SERPL-SCNC: 4 MMOL/L (ref 3.5–5.2)
PROT SERPL-MCNC: 5.3 G/DL (ref 6–8.5)
RBC # BLD AUTO: 3.22 10*6/MM3 (ref 3.77–5.28)
SODIUM SERPL-SCNC: 129 MMOL/L (ref 136–145)
T4 FREE SERPL-MCNC: 1.26 NG/DL (ref 0.93–1.7)
TRIGL SERPL-MCNC: 88 MG/DL (ref 0–150)
TSH SERPL DL<=0.05 MIU/L-ACNC: 5.93 UIU/ML (ref 0.27–4.2)
VIT B12 BLD-MCNC: 581 PG/ML (ref 211–946)
VLDLC SERPL-MCNC: 17 MG/DL (ref 5–40)
WBC # BLD AUTO: 9.47 10*3/MM3 (ref 3.4–10.8)

## 2020-12-07 PROCEDURE — 25010000002 IOPAMIDOL 61 % SOLUTION: Performed by: NURSE PRACTITIONER

## 2020-12-07 PROCEDURE — 80053 COMPREHEN METABOLIC PANEL: CPT | Performed by: FAMILY MEDICINE

## 2020-12-07 PROCEDURE — 84439 ASSAY OF FREE THYROXINE: CPT | Performed by: FAMILY MEDICINE

## 2020-12-07 PROCEDURE — 71260 CT THORAX DX C+: CPT

## 2020-12-07 PROCEDURE — 36415 COLL VENOUS BLD VENIPUNCTURE: CPT | Performed by: FAMILY MEDICINE

## 2020-12-07 PROCEDURE — 82746 ASSAY OF FOLIC ACID SERUM: CPT | Performed by: FAMILY MEDICINE

## 2020-12-07 PROCEDURE — 82607 VITAMIN B-12: CPT | Performed by: FAMILY MEDICINE

## 2020-12-07 PROCEDURE — 80061 LIPID PANEL: CPT | Performed by: FAMILY MEDICINE

## 2020-12-07 PROCEDURE — 74177 CT ABD & PELVIS W/CONTRAST: CPT

## 2020-12-07 PROCEDURE — 84443 ASSAY THYROID STIM HORMONE: CPT | Performed by: FAMILY MEDICINE

## 2020-12-07 PROCEDURE — 70553 MRI BRAIN STEM W/O & W/DYE: CPT

## 2020-12-07 PROCEDURE — 0 GADOBENATE DIMEGLUMINE 529 MG/ML SOLUTION: Performed by: NURSE PRACTITIONER

## 2020-12-07 PROCEDURE — 85025 COMPLETE CBC W/AUTO DIFF WBC: CPT | Performed by: FAMILY MEDICINE

## 2020-12-07 PROCEDURE — 83036 HEMOGLOBIN GLYCOSYLATED A1C: CPT | Performed by: FAMILY MEDICINE

## 2020-12-07 PROCEDURE — 70491 CT SOFT TISSUE NECK W/DYE: CPT

## 2020-12-07 PROCEDURE — A9577 INJ MULTIHANCE: HCPCS | Performed by: NURSE PRACTITIONER

## 2020-12-07 RX ORDER — FUROSEMIDE 20 MG/1
20 TABLET ORAL DAILY PRN
Qty: 5 TABLET | Refills: 0 | Status: SHIPPED | OUTPATIENT
Start: 2020-12-07 | End: 2021-03-11

## 2020-12-07 RX ADMIN — IOPAMIDOL 75 ML: 612 INJECTION, SOLUTION INTRAVENOUS at 13:11

## 2020-12-07 RX ADMIN — IOPAMIDOL 75 ML: 612 INJECTION, SOLUTION INTRAVENOUS at 13:10

## 2020-12-07 RX ADMIN — GADOBENATE DIMEGLUMINE 9 ML: 529 INJECTION, SOLUTION INTRAVENOUS at 12:28

## 2020-12-07 NOTE — TELEPHONE ENCOUNTER
Called and left vm for patient (per patient request to leave a message) regarding recommendations.  Advised that per viviana Thompson we could refill what the er gave patient (20mg prn for up to 5 doses) needs to keep f/u and to use compression stockings if she is not already.  ADvised patient to call if any other questions.

## 2020-12-07 NOTE — TELEPHONE ENCOUNTER
"----- Message from SAMANTHA Perdomo sent at 12/7/2020  1:13 PM EST -----  Regarding: RE: pt complaints  We can refill what ED gave her. I think it was Lasix 20mg prn for up to 5 doses. Then she needs to follow up with us. Can you also make sure that she is using compression stockings?    Thanks,     jeanne  ----- Message -----  From: Laurel Abreu RN  Sent: 12/7/2020  11:24 AM EST  To: SAMANTHA Perdomo  Subject: pt complaints                                    Kathe Kumar I received this message on triage - \"Patient called in to the triage line. States having increased frequency with the lower leg/ankles swelling. Seen in the ED 11/14, given lasix. Does she need a new script? FYI - Has scans today from 12 - 3\"  I called patient and she reports that the lasix did help her swelling.  She said it is in both feet, ankles and now going up both legs.  She said it does go down significantly with elevation.  Would you want to call in some lasix or anything else?  Please advise.          "

## 2020-12-08 DIAGNOSIS — E03.9 ACQUIRED HYPOTHYROIDISM: Primary | ICD-10-CM

## 2020-12-08 RX ORDER — LEVOTHYROXINE SODIUM 88 UG/1
88 TABLET ORAL DAILY
Qty: 90 TABLET | Refills: 3 | Status: SHIPPED | OUTPATIENT
Start: 2020-12-08

## 2020-12-08 NOTE — PROGRESS NOTES
Please let her know electrolytes off somewhat likely related to her cancer, treatments. Thyroid dose needs increased and I've sent 88 mcg in for her. Recheck thyroid level 6 weeks, at hospital, order is in. Vitamin B12, folic acid both normal range. Cholesterol is good stay on med. Not diabetic.

## 2020-12-09 ENCOUNTER — TELEPHONE (OUTPATIENT)
Dept: INTERNAL MEDICINE | Facility: CLINIC | Age: 70
End: 2020-12-09

## 2020-12-09 ENCOUNTER — LAB (OUTPATIENT)
Dept: LAB | Facility: HOSPITAL | Age: 70
End: 2020-12-09

## 2020-12-09 ENCOUNTER — OFFICE VISIT (OUTPATIENT)
Dept: ONCOLOGY | Facility: CLINIC | Age: 70
End: 2020-12-09

## 2020-12-09 VITALS
HEART RATE: 95 BPM | HEIGHT: 62 IN | TEMPERATURE: 97.1 F | OXYGEN SATURATION: 96 % | SYSTOLIC BLOOD PRESSURE: 122 MMHG | DIASTOLIC BLOOD PRESSURE: 73 MMHG | BODY MASS INDEX: 19.32 KG/M2 | WEIGHT: 105 LBS | RESPIRATION RATE: 12 BRPM

## 2020-12-09 DIAGNOSIS — C34.90 SCLC (SMALL CELL LUNG CARCINOMA) (HCC): Primary | ICD-10-CM

## 2020-12-09 DIAGNOSIS — C34.90 SCLC (SMALL CELL LUNG CARCINOMA) (HCC): ICD-10-CM

## 2020-12-09 DIAGNOSIS — E03.9 ACQUIRED HYPOTHYROIDISM: ICD-10-CM

## 2020-12-09 LAB
ALBUMIN SERPL-MCNC: 2.2 G/DL (ref 3.5–5.2)
ALBUMIN/GLOB SERPL: 0.8 G/DL
ALP SERPL-CCNC: 138 U/L (ref 39–117)
ALT SERPL W P-5'-P-CCNC: 14 U/L (ref 1–33)
ANION GAP SERPL CALCULATED.3IONS-SCNC: 6.9 MMOL/L (ref 5–15)
AST SERPL-CCNC: 14 U/L (ref 1–32)
BASOPHILS # BLD AUTO: 0.02 10*3/MM3 (ref 0–0.2)
BASOPHILS NFR BLD AUTO: 0.2 % (ref 0–1.5)
BILIRUB SERPL-MCNC: 0.2 MG/DL (ref 0–1.2)
BUN SERPL-MCNC: 16 MG/DL (ref 8–23)
BUN/CREAT SERPL: 26.2 (ref 7–25)
CALCIUM SPEC-SCNC: 7.9 MG/DL (ref 8.6–10.5)
CHLORIDE SERPL-SCNC: 100 MMOL/L (ref 98–107)
CO2 SERPL-SCNC: 29.1 MMOL/L (ref 22–29)
CREAT SERPL-MCNC: 0.61 MG/DL (ref 0.57–1)
DEPRECATED RDW RBC AUTO: 67.5 FL (ref 37–54)
EOSINOPHIL # BLD AUTO: 0.01 10*3/MM3 (ref 0–0.4)
EOSINOPHIL NFR BLD AUTO: 0.1 % (ref 0.3–6.2)
ERYTHROCYTE [DISTWIDTH] IN BLOOD BY AUTOMATED COUNT: 19.4 % (ref 12.3–15.4)
GFR SERPL CREATININE-BSD FRML MDRD: 98 ML/MIN/1.73
GLOBULIN UR ELPH-MCNC: 2.8 GM/DL
GLUCOSE SERPL-MCNC: 83 MG/DL (ref 65–99)
HCT VFR BLD AUTO: 31 % (ref 34–46.6)
HCV AB SER DONR QL: NORMAL
HGB BLD-MCNC: 10 G/DL (ref 12–15.9)
IMM GRANULOCYTES # BLD AUTO: 0.05 10*3/MM3 (ref 0–0.05)
IMM GRANULOCYTES NFR BLD AUTO: 0.5 % (ref 0–0.5)
LYMPHOCYTES # BLD AUTO: 2.44 10*3/MM3 (ref 0.7–3.1)
LYMPHOCYTES NFR BLD AUTO: 25.8 % (ref 19.6–45.3)
MAGNESIUM SERPL-MCNC: 1.8 MG/DL (ref 1.6–2.4)
MCH RBC QN AUTO: 30.7 PG (ref 26.6–33)
MCHC RBC AUTO-ENTMCNC: 32.3 G/DL (ref 31.5–35.7)
MCV RBC AUTO: 95.1 FL (ref 79–97)
MONOCYTES # BLD AUTO: 0.91 10*3/MM3 (ref 0.1–0.9)
MONOCYTES NFR BLD AUTO: 9.6 % (ref 5–12)
NEUTROPHILS NFR BLD AUTO: 6.01 10*3/MM3 (ref 1.7–7)
NEUTROPHILS NFR BLD AUTO: 63.8 % (ref 42.7–76)
NRBC BLD AUTO-RTO: 0 /100 WBC (ref 0–0.2)
PLATELET # BLD AUTO: 355 10*3/MM3 (ref 140–450)
PMV BLD AUTO: 8.8 FL (ref 6–12)
POTASSIUM SERPL-SCNC: 3.5 MMOL/L (ref 3.5–5.2)
PROT SERPL-MCNC: 5 G/DL (ref 6–8.5)
RBC # BLD AUTO: 3.26 10*6/MM3 (ref 3.77–5.28)
SODIUM SERPL-SCNC: 136 MMOL/L (ref 136–145)
T4 FREE SERPL-MCNC: 1.29 NG/DL (ref 0.93–1.7)
TSH SERPL DL<=0.05 MIU/L-ACNC: 6.56 UIU/ML (ref 0.27–4.2)
WBC # BLD AUTO: 9.44 10*3/MM3 (ref 3.4–10.8)

## 2020-12-09 PROCEDURE — 99215 OFFICE O/P EST HI 40 MIN: CPT | Performed by: INTERNAL MEDICINE

## 2020-12-09 PROCEDURE — 80053 COMPREHEN METABOLIC PANEL: CPT

## 2020-12-09 PROCEDURE — 83735 ASSAY OF MAGNESIUM: CPT

## 2020-12-09 PROCEDURE — 86803 HEPATITIS C AB TEST: CPT | Performed by: FAMILY MEDICINE

## 2020-12-09 PROCEDURE — 84443 ASSAY THYROID STIM HORMONE: CPT

## 2020-12-09 PROCEDURE — 84439 ASSAY OF FREE THYROXINE: CPT

## 2020-12-09 PROCEDURE — 85025 COMPLETE CBC W/AUTO DIFF WBC: CPT

## 2020-12-09 PROCEDURE — 36415 COLL VENOUS BLD VENIPUNCTURE: CPT | Performed by: FAMILY MEDICINE

## 2020-12-09 RX ORDER — PALONOSETRON 0.05 MG/ML
0.25 INJECTION, SOLUTION INTRAVENOUS ONCE
Status: CANCELLED | OUTPATIENT
Start: 2020-12-16

## 2020-12-09 RX ORDER — ONDANSETRON HYDROCHLORIDE 8 MG/1
8 TABLET, FILM COATED ORAL 3 TIMES DAILY PRN
Qty: 30 TABLET | Refills: 5 | Status: SHIPPED | OUTPATIENT
Start: 2020-12-09

## 2020-12-09 RX ORDER — PALONOSETRON 0.05 MG/ML
0.25 INJECTION, SOLUTION INTRAVENOUS ONCE
Status: CANCELLED | OUTPATIENT
Start: 2021-01-06

## 2020-12-09 RX ORDER — SODIUM CHLORIDE 9 MG/ML
250 INJECTION, SOLUTION INTRAVENOUS ONCE
Status: CANCELLED | OUTPATIENT
Start: 2020-12-16

## 2020-12-09 RX ORDER — SODIUM CHLORIDE 9 MG/ML
250 INJECTION, SOLUTION INTRAVENOUS ONCE
Status: CANCELLED | OUTPATIENT
Start: 2021-01-06

## 2020-12-09 NOTE — TELEPHONE ENCOUNTER
Patient returned clinic's call regarding most recent blood work and was informed of those results.     Patient wanted to let PCP know that Dr. Cao has stated that she is unable to have the endoscopy or the US of the gallbladder performed due to her medical history.     I attempted to ask what she meant and the phone was disconnected; I have been unable to reach her since but I will continue to try

## 2020-12-09 NOTE — PROGRESS NOTES
DATE OF VISIT: 12/9/2020    REASON FOR VISIT: Followup for extensive stage small cell lung cancer     HISTORY OF PRESENT ILLNESS: The patient is a very pleasant 70 y.o. female with past medical history significant for extensive stage small cell lung cancer diagnosed December 2, 2019.  She was started on palliative treatment with carboplatin and etoposide and Tecentriq December 15, 2019. Tecentriq maintenance started 03/11/2020.  The patient had progressive disease and she was switched to Opdivo plus Yervoy.  She completed 4 cycles August 13, 2020.  This was followed by Opdivo single agent.  Repeat scans done December 7, 2020 revealed progressive disease.  The patient is here today for acute visit.     SUBJECTIVE: Filippo is here today with her sister.  She is any fever chills night sweats.  She is complaining of persistent nausea.  Her appetite has improved slightly over the last few weeks.  She is moved to live with a different sister and she has been eating more since then.      PAST MEDICAL HISTORY/SOCIAL HISTORY/FAMILY HISTORY: Reviewed by me and unchanged from my documentation done on 12/09/20.    Review of Systems   Constitutional: Positive for fatigue. Negative for activity change, appetite change, chills, fever and unexpected weight change.   HENT: Negative for hearing loss, mouth sores, nosebleeds, sore throat and trouble swallowing.    Eyes: Negative for visual disturbance.   Respiratory: Negative for cough, chest tightness, shortness of breath and wheezing.    Cardiovascular: Negative for chest pain and palpitations.   Gastrointestinal: Positive for constipation, nausea and vomiting. Negative for abdominal distention, abdominal pain, blood in stool, diarrhea and rectal pain.   Endocrine: Negative for cold intolerance and heat intolerance.   Genitourinary: Negative for difficulty urinating, dysuria, frequency and urgency.   Musculoskeletal: Negative for arthralgias, back pain, gait problem, joint swelling and  myalgias.   Skin: Negative for rash.   Neurological: Negative for dizziness, tremors, syncope, weakness, light-headedness, numbness and headaches.   Hematological: Negative for adenopathy. Does not bruise/bleed easily.   Psychiatric/Behavioral: Negative for confusion, sleep disturbance and suicidal ideas. The patient is not nervous/anxious.          Current Outpatient Medications:   •  acetaminophen (TYLENOL) 500 MG tablet, Take 500 mg by mouth Every 6 (Six) Hours As Needed for Mild Pain ., Disp: , Rfl:   •  albuterol sulfate HFA (Ventolin HFA) 108 (90 Base) MCG/ACT inhaler, Inhale 2 puffs Every 4 (Four) Hours As Needed for Wheezing or Shortness of Air., Disp: 18 g, Rfl: 5  •  amLODIPine (NORVASC) 5 MG tablet, Take 1 tablet by mouth Daily., Disp: 90 tablet, Rfl: 3  •  baclofen (LIORESAL) 10 MG tablet, START- ONE TAB NIGHTLY X7 DAYS, THEN 1 TAB 2X A DAY X7 DAYS, THEN 1 TAB 3X A DAY, Disp: 270 tablet, Rfl: 1  •  Calcium Carbonate-Vitamin D (CALCIUM-CARB 600 + D) 600-125 MG-UNIT tablet, Take 500 mg by mouth 2 (Two) Times a Day., Disp: 60 each, Rfl: 5  •  diclofenac (VOLTAREN) 75 MG EC tablet, Take 1 tablet by mouth Daily As Needed (pain)., Disp: 90 tablet, Rfl: 3  •  docusate sodium (COLACE) 100 MG capsule, Take 1 tablet twice daily, Disp: 60 capsule, Rfl: 3  •  furosemide (LASIX) 20 MG tablet, Take 1 tablet by mouth Daily As Needed (leg swelling) for up to 5 doses., Disp: 5 tablet, Rfl: 0  •  gabapentin (NEURONTIN) 400 MG capsule, Take 1 capsule by mouth Daily With Breakfast & Lunch. May also take 2 capsules At Night As Needed (neuropathy, pain)., Disp: 270 capsule, Rfl: 1  •  ipratropium-albuterol (DUO-NEB) 0.5-2.5 mg/3 ml nebulizer, Take 3 mL by nebulization 4 (Four) Times a Day As Needed for Wheezing or Shortness of Air., Disp: 120 mL, Rfl: 5  •  levothyroxine (SYNTHROID, LEVOTHROID) 88 MCG tablet, Take 1 tablet by mouth Daily. Indications: Underactive Thyroid, Disp: 90 tablet, Rfl: 3  •  lidocaine (LIDODERM) 5 %,  Place 1 patch on the skin as directed by provider Daily. Remove & Discard patch within 12 hours or as directed by MD, Disp: 90 each, Rfl: 3  •  Lidocaine Viscous HCl (XYLOCAINE) 2 % solution, , Disp: , Rfl:   •  lidocaine-prilocaine (EMLA) 2.5-2.5 % cream, Apply  topically to the appropriate area as directed As Needed (45-60 minutes prior to port access.  Cover with saran/plastic wrap.)., Disp: 30 g, Rfl: 3  •  LORazepam (Ativan) 1 MG tablet, Take 1 tablet by mouth Every 8 (Eight) Hours As Needed for Anxiety., Disp: 90 tablet, Rfl: 2  •  magic mouthwash oral suspension, Swish and spit (or swallow) 1-2 Teaspoonfuls (5-10ml) 4 times a day as needed, Disp: 240 mL, Rfl: 3  •  methylPREDNISolone (MEDROL) 4 MG dose pack, Take as directed on package instructions., Disp: 21 tablet, Rfl: 0  •  Nebulizer device, 1 Device Take As Directed., Disp: 1 each, Rfl: 0  •  OLANZapine (zyPREXA) 5 MG tablet, Take 1 tablet by mouth Every Night., Disp: 30 tablet, Rfl: 5  •  omeprazole (priLOSEC) 40 MG capsule, Take 1 capsule by mouth Daily., Disp: 90 capsule, Rfl: 2  •  ondansetron ODT (ZOFRAN-ODT) 8 MG disintegrating tablet, PLACE 1 TABLET ON THE TONGUE EVERY 8 (EIGHT) HOURS AS NEEDED FOR NAUSEA OR VOMITING., Disp: 30 tablet, Rfl: 4  •  pravastatin (PRAVACHOL) 40 MG tablet, Take 1 tablet by mouth Daily., Disp: 90 tablet, Rfl: 3  •  prochlorperazine (COMPAZINE) 10 MG tablet, Take 1 tablet by mouth Every 6 (Six) Hours As Needed for Nausea or Vomiting., Disp: 60 tablet, Rfl: 5  •  Stiolto Respimat 2.5-2.5 MCG/ACT aerosol solution inhaler, INHALE 2 PUFFS BY MOUTH DAILY, Disp: 1 inhaler, Rfl: 1  •  traMADol (ULTRAM) 50 MG tablet, Take 1 tablet by mouth Every 8 (Eight) Hours As Needed for Severe Pain ., Disp: 270 tablet, Rfl: 1    Current Facility-Administered Medications:   •  heparin injection 500 Units, 500 Units, Intracatheter, Q8H PRN, Kathe Higuera APRN, 500 Units at 11/17/20 1116    PHYSICAL EXAMINATION:   /73   Pulse 95    "Temp 97.1 °F (36.2 °C) (Temporal)   Resp 12   Ht 157.5 cm (62\")   Wt 47.6 kg (105 lb)   SpO2 96%   BMI 19.20 kg/m²    ECOG Performance Status: 1 - Symptomatic but completely ambulatory  General Appearance:  alert, cooperative, no apparent distress, appears stated age and normal weight   Neurologic/Psychiatric: A&O x 3, gait steady, appropriate affect, strength 5/5 in all muscle groups   HEENT:  Normocephalic, without obvious abnormality, mucous membranes moist   Neck: Supple, symmetrical, trachea midline, no adenopathy;  No thyromegaly, masses, or tenderness   Lungs:   Clear to auscultation bilaterally; respirations regular, even, and unlabored bilaterally   Heart:  Regular rate and rhythm, no murmurs appreciated   Abdomen:   Soft, non-tender, non-distended and no organomegaly   Lymph nodes: No cervical, supraclavicular, inguinal or axillary adenopathy noted.  Submandibular nodule noted approximately 1cm   Extremities: Normal, atraumatic; no clubbing, cyanosis, or edema    Skin: No rashes, ulcers, or suspicious lesions noted     Office Visit on 12/02/2020   Component Date Value Ref Range Status   • Glucose 12/07/2020 80  65 - 99 mg/dL Final   • BUN 12/07/2020 15  8 - 23 mg/dL Final   • Creatinine 12/07/2020 0.58  0.57 - 1.00 mg/dL Final   • Sodium 12/07/2020 129* 136 - 145 mmol/L Final   • Potassium 12/07/2020 4.0  3.5 - 5.2 mmol/L Final    Slight hemolysis detected by analyzer. Results may be affected.   • Chloride 12/07/2020 98  98 - 107 mmol/L Final   • CO2 12/07/2020 24.1  22.0 - 29.0 mmol/L Final   • Calcium 12/07/2020 7.7* 8.6 - 10.5 mg/dL Final   • Total Protein 12/07/2020 5.3* 6.0 - 8.5 g/dL Final   • Albumin 12/07/2020 2.10* 3.50 - 5.20 g/dL Final   • ALT (SGPT) 12/07/2020 16  1 - 33 U/L Final   • AST (SGOT) 12/07/2020 18  1 - 32 U/L Final    Slight hemolysis detected by analyzer. Results may be affected.   • Alkaline Phosphatase 12/07/2020 140* 39 - 117 U/L Final   • Total Bilirubin 12/07/2020 0.2  " 0.0 - 1.2 mg/dL Final   • eGFR Non African Amer 12/07/2020 103  >60 mL/min/1.73 Final   • Globulin 12/07/2020 3.2  gm/dL Final   • A/G Ratio 12/07/2020 0.7  g/dL Final   • BUN/Creatinine Ratio 12/07/2020 25.9* 7.0 - 25.0 Final   • Anion Gap 12/07/2020 6.9  5.0 - 15.0 mmol/L Final   • Total Cholesterol 12/07/2020 106  0 - 200 mg/dL Final   • Triglycerides 12/07/2020 88  0 - 150 mg/dL Final   • HDL Cholesterol 12/07/2020 56  40 - 60 mg/dL Final   • LDL Cholesterol  12/07/2020 33  0 - 100 mg/dL Final   • VLDL Cholesterol 12/07/2020 17  5 - 40 mg/dL Final   • LDL/HDL Ratio 12/07/2020 0.58   Final   • TSH 12/07/2020 5.930* 0.270 - 4.200 uIU/mL Final   • Free T4 12/07/2020 1.26  0.93 - 1.70 ng/dL Final    T4 results may be falsely increased if patient taking Biotin.   • Vitamin B-12 12/07/2020 581  211 - 946 pg/mL Final   • Folate 12/07/2020 12.40  4.78 - 24.20 ng/mL Final   • Hemoglobin A1C 12/07/2020 5.00  4.80 - 5.60 % Final   • WBC 12/07/2020 9.47  3.40 - 10.80 10*3/mm3 Final   • RBC 12/07/2020 3.22* 3.77 - 5.28 10*6/mm3 Final   • Hemoglobin 12/07/2020 10.1* 12.0 - 15.9 g/dL Final   • Hematocrit 12/07/2020 30.9* 34.0 - 46.6 % Final   • MCV 12/07/2020 96.0  79.0 - 97.0 fL Final   • MCH 12/07/2020 31.4  26.6 - 33.0 pg Final   • MCHC 12/07/2020 32.7  31.5 - 35.7 g/dL Final   • RDW 12/07/2020 19.6* 12.3 - 15.4 % Final   • RDW-SD 12/07/2020 69.2* 37.0 - 54.0 fl Final   • MPV 12/07/2020 9.3  6.0 - 12.0 fL Final   • Platelets 12/07/2020 297  140 - 450 10*3/mm3 Final   • Neutrophil % 12/07/2020 62.1  42.7 - 76.0 % Final   • Lymphocyte % 12/07/2020 28.4  19.6 - 45.3 % Final   • Monocyte % 12/07/2020 8.6  5.0 - 12.0 % Final   • Eosinophil % 12/07/2020 0.1* 0.3 - 6.2 % Final   • Basophil % 12/07/2020 0.5  0.0 - 1.5 % Final   • Immature Grans % 12/07/2020 0.3  0.0 - 0.5 % Final   • Neutrophils, Absolute 12/07/2020 5.88  1.70 - 7.00 10*3/mm3 Final   • Lymphocytes, Absolute 12/07/2020 2.69  0.70 - 3.10 10*3/mm3 Final   • Monocytes,  Absolute 12/07/2020 0.81  0.10 - 0.90 10*3/mm3 Final   • Eosinophils, Absolute 12/07/2020 0.01  0.00 - 0.40 10*3/mm3 Final   • Basophils, Absolute 12/07/2020 0.05  0.00 - 0.20 10*3/mm3 Final   • Immature Grans, Absolute 12/07/2020 0.03  0.00 - 0.05 10*3/mm3 Final   • nRBC 12/07/2020 0.0  0.0 - 0.2 /100 WBC Final      Ct Soft Tissue Neck With Contrast    Result Date: 12/7/2020  Narrative: PROCEDURE: CT SOFT TISSUE NECK W CONTRAST-, CT CHEST W CONTRAST-, CT ABDOMEN PELVIS W CONTRAST-  HISTORY: Follow up extensive stage small cell lung cancer; C34.90-Malignant neoplasm of unspecified part of unspecified bronchus or lung  COMPARISON: 08/31/2020.  PROCEDURE: Axial images were obtained from the skull base to the pubic symphysis following the administration of Isovue-300 contrast.  FINDINGS:  NECK: Soft tissue windows redemonstrate right jugular chain lymph nodes which are essentially unchanged. For example on image 34 there is a 1.8 cm right jugular chain lymph node. On image 42 there is a 2 cm jugular chain lymph node. There is a newly enlarged left posterior triangle lymph node on image 35 measuring 9 mm. No other new or enlarging lymph nodes are seen within the neck. The right submandibular gland has a lobular contour, however is unchanged. No mucosal lesion is seen in the nasopharynx, oropharynx or larynx. The trachea is midline. The major neck vasculature is patent. Bone windows reveal no lytic or destructive lesions.  CHEST: Soft tissue windows redemonstrate subcentimeter mediastinal lymph nodes which are unchanged. No new or enlarging adenopathy is seen in the chest. The heart is normal in size. There are no pleural or pericardial effusions. Lung windows redemonstrate an area of suspected scarring in the right lung apex. No new or enlarging nodules are identified. Atelectasis is seen in the left lung base. Bone windows reveal no lytic or destructive lesions..  ABDOMEN: There has been significant interval increase  in size of a right adrenal metastasis which measures 8.9 x 6.8 cm and demonstrates central necrosis. This previously measured 4.6 x 2.3 cm. There is a new left adrenal nodule measuring 1.5 cm. A small, 1.5 cm hypodense lesion medial right lobe of the liver on image 15 is unchanged. No new hepatic lesions are identified. The kidneys, spleen and pancreas are unremarkable. The abdominal portions of the GI tract are within normal limits. There are shotty appearing lymph nodes in the retroperitoneum which are unchanged. There is a small amount of ascites within the abdomen which is new compared to the prior exam.  PELVIS: The pelvic viscera is unremarkable. There is a moderate amount of stool within the colon suggesting constipation. There is a small amount of free fluid in the pelvis. Bone windows reveal no lytic or destructive lesions.      Impression: 1. Significant interval increase in size of the right adrenal mass measuring 8.9 cm on today's exam. There is a new 1.5 cm left adrenal nodule consistent with metastatic disease. Shotty appearing lymph nodes in the retroperitoneum are also concerning for metastatic disease, however these are unchanged. 2. Newly enlarged left posterior triangle lymph node in the neck. 3. Stable right jugular chain adenopathy.         This study was performed with techniques to keep radiation doses as low as reasonably achievable (ALARA). Individualized dose reduction techniques using automated exposure control or adjustment of mA and/or kV according to the patient size were employed.    This report was finalized on 12/7/2020 1:54 PM by Pamela Oliveros M.D..    Ct Chest With Contrast    Result Date: 12/7/2020  Narrative: PROCEDURE: CT SOFT TISSUE NECK W CONTRAST-, CT CHEST W CONTRAST-, CT ABDOMEN PELVIS W CONTRAST-  HISTORY: Follow up extensive stage small cell lung cancer; C34.90-Malignant neoplasm of unspecified part of unspecified bronchus or lung  COMPARISON: 08/31/2020.  PROCEDURE:  Axial images were obtained from the skull base to the pubic symphysis following the administration of Isovue-300 contrast.  FINDINGS:  NECK: Soft tissue windows redemonstrate right jugular chain lymph nodes which are essentially unchanged. For example on image 34 there is a 1.8 cm right jugular chain lymph node. On image 42 there is a 2 cm jugular chain lymph node. There is a newly enlarged left posterior triangle lymph node on image 35 measuring 9 mm. No other new or enlarging lymph nodes are seen within the neck. The right submandibular gland has a lobular contour, however is unchanged. No mucosal lesion is seen in the nasopharynx, oropharynx or larynx. The trachea is midline. The major neck vasculature is patent. Bone windows reveal no lytic or destructive lesions.  CHEST: Soft tissue windows redemonstrate subcentimeter mediastinal lymph nodes which are unchanged. No new or enlarging adenopathy is seen in the chest. The heart is normal in size. There are no pleural or pericardial effusions. Lung windows redemonstrate an area of suspected scarring in the right lung apex. No new or enlarging nodules are identified. Atelectasis is seen in the left lung base. Bone windows reveal no lytic or destructive lesions..  ABDOMEN: There has been significant interval increase in size of a right adrenal metastasis which measures 8.9 x 6.8 cm and demonstrates central necrosis. This previously measured 4.6 x 2.3 cm. There is a new left adrenal nodule measuring 1.5 cm. A small, 1.5 cm hypodense lesion medial right lobe of the liver on image 15 is unchanged. No new hepatic lesions are identified. The kidneys, spleen and pancreas are unremarkable. The abdominal portions of the GI tract are within normal limits. There are shotty appearing lymph nodes in the retroperitoneum which are unchanged. There is a small amount of ascites within the abdomen which is new compared to the prior exam.  PELVIS: The pelvic viscera is unremarkable.  There is a moderate amount of stool within the colon suggesting constipation. There is a small amount of free fluid in the pelvis. Bone windows reveal no lytic or destructive lesions.      Impression: 1. Significant interval increase in size of the right adrenal mass measuring 8.9 cm on today's exam. There is a new 1.5 cm left adrenal nodule consistent with metastatic disease. Shotty appearing lymph nodes in the retroperitoneum are also concerning for metastatic disease, however these are unchanged. 2. Newly enlarged left posterior triangle lymph node in the neck. 3. Stable right jugular chain adenopathy.         This study was performed with techniques to keep radiation doses as low as reasonably achievable (ALARA). Individualized dose reduction techniques using automated exposure control or adjustment of mA and/or kV according to the patient size were employed.    This report was finalized on 12/7/2020 1:54 PM by Pamela Oliveros M.D..    Mri Brain With & Without Contrast    Result Date: 12/7/2020  Narrative: PROCEDURE: MRI BRAIN W WO CONTRAST-  HISTORY: Brain metastases suspected  PROCEDURE: Multiplanar multisequence imaging of the brain was performed both before and following the administration of 15 mL MultiHance intravenous contrast.  COMPARISON: 07/17/2020.  FINDINGS: There is generalized cerebral volume loss. FLAIR hyperintensities in the periventricular white matter of both cerebral hemispheres is consistent with chronic small vessel ischemic change. There is no mass, mass effect or midline shift. There is no hydrocephalus. There are no areas of restricted diffusion. There is no pathologic contrast enhancement.  The midbrain, josephine, cerebellum and craniocervical junction are unremarkable. The sella and pituitary gland are within normal limits. The major intracranial vasculature demonstrates the expected flow related signal. The paranasal sinuses are clear. Limited images of the soft tissues of the neck  demonstrate enlarged right jugular chain lymph nodes.      Impression: No evidence of metastatic disease within the brain.    This report was finalized on 12/7/2020 1:12 PM by Pamela Oliveros M.D..    Ct Abdomen Pelvis With Contrast    Result Date: 12/7/2020  Narrative: PROCEDURE: CT SOFT TISSUE NECK W CONTRAST-, CT CHEST W CONTRAST-, CT ABDOMEN PELVIS W CONTRAST-  HISTORY: Follow up extensive stage small cell lung cancer; C34.90-Malignant neoplasm of unspecified part of unspecified bronchus or lung  COMPARISON: 08/31/2020.  PROCEDURE: Axial images were obtained from the skull base to the pubic symphysis following the administration of Isovue-300 contrast.  FINDINGS:  NECK: Soft tissue windows redemonstrate right jugular chain lymph nodes which are essentially unchanged. For example on image 34 there is a 1.8 cm right jugular chain lymph node. On image 42 there is a 2 cm jugular chain lymph node. There is a newly enlarged left posterior triangle lymph node on image 35 measuring 9 mm. No other new or enlarging lymph nodes are seen within the neck. The right submandibular gland has a lobular contour, however is unchanged. No mucosal lesion is seen in the nasopharynx, oropharynx or larynx. The trachea is midline. The major neck vasculature is patent. Bone windows reveal no lytic or destructive lesions.  CHEST: Soft tissue windows redemonstrate subcentimeter mediastinal lymph nodes which are unchanged. No new or enlarging adenopathy is seen in the chest. The heart is normal in size. There are no pleural or pericardial effusions. Lung windows redemonstrate an area of suspected scarring in the right lung apex. No new or enlarging nodules are identified. Atelectasis is seen in the left lung base. Bone windows reveal no lytic or destructive lesions..  ABDOMEN: There has been significant interval increase in size of a right adrenal metastasis which measures 8.9 x 6.8 cm and demonstrates central necrosis. This previously  measured 4.6 x 2.3 cm. There is a new left adrenal nodule measuring 1.5 cm. A small, 1.5 cm hypodense lesion medial right lobe of the liver on image 15 is unchanged. No new hepatic lesions are identified. The kidneys, spleen and pancreas are unremarkable. The abdominal portions of the GI tract are within normal limits. There are shotty appearing lymph nodes in the retroperitoneum which are unchanged. There is a small amount of ascites within the abdomen which is new compared to the prior exam.  PELVIS: The pelvic viscera is unremarkable. There is a moderate amount of stool within the colon suggesting constipation. There is a small amount of free fluid in the pelvis. Bone windows reveal no lytic or destructive lesions.      Impression: 1. Significant interval increase in size of the right adrenal mass measuring 8.9 cm on today's exam. There is a new 1.5 cm left adrenal nodule consistent with metastatic disease. Shotty appearing lymph nodes in the retroperitoneum are also concerning for metastatic disease, however these are unchanged. 2. Newly enlarged left posterior triangle lymph node in the neck. 3. Stable right jugular chain adenopathy.         This study was performed with techniques to keep radiation doses as low as reasonably achievable (ALARA). Individualized dose reduction techniques using automated exposure control or adjustment of mA and/or kV according to the patient size were employed.    This report was finalized on 12/7/2020 1:54 PM by Pamela Oliveros M.D..    Xr Chest 1 View    Result Date: 11/15/2020  Narrative: PORTABLE CHEST    11/14/2020 9:53 PM  HISTORY: Bilateral lower extremity edema  COMPARISON: CT chest 08/31/2020.  FINDINGS: Normal heart size. Bibasilar opacities could be atelectasis and/or pneumonia. Background COPD. No effusion or pneumothorax. Left Port-A-Cath, tip SVC.      Impression: Bibasilar opacities, atelectasis versus pneumonia.  This report was finalized on 11/15/2020 6:51 AM  by Dr Diego Michelle DO.  (    ASSESSMENT: The patient is a very pleasant 70 y.o. female  with right upper lobe extensive stage small cell lung cancer     PROBLEM LIST:   1.  Right upper lobe extensive stage small cell lung cancer, [id number] stage IVb:  A.  Presented with right lower ribs pain  B.  CT chest revealed 3.5 cm right upper lobe lung mass, right adrenal nodule, mediastinal adenopathy, and left cervical lymphadenopathy.  C.  Status post left cervical lymph node biopsy done by Dr. Owen December 2, 2019  D.  Pathology consistent with small cell carcinoma  E.  Started palliative treatment with carboplatin and etoposide and Tecentriq December 15, 2019, status post 8 cycles  F.  Progressive disease documented CT scans done on June 1, 2020 with a new cervical lymphadenopathy and relapsed right adrenal mass  G.  Started Opdivo plus Yervoy Brenda 10, 2020, status post 4 cycle followed by Opdivo single agent with unfortunately progressive disease documented on CT scans done December 7, 2020.  H.  We will start third line treatment with Zepzelca with 25% dose reduction December 16, 2020  2.  COPD  3.  Hypertension  4.  Hypercholesterolemia  5.  Hypothyroid  6.  GERD  7.  Progressive nausea and vomiting    PLAN:  1.  I did go over the scan results with the patient and her sister I reviewed the films myself and went over the pictures with them.  Her right adrenal mass had significantly increased in size.  She had a new left adrenal mass and progressive lymphadenopathy.  2.  At this point I gave the patient 2 options either proceed with third line treatment versus best supportive care trying to focus on symptoms and maybe asking for hospice help down the road.  The patient is interested in active cancer treatment.  3.  I will start the patient on Zepzelca once every 3 weeks with 25% dose reduction given her borderline performance status and previous chemotherapy and immunotherapy exposure.  4. I will continue to monitor the  patient blood work including blood counts kidney function liver function electrolytes function.  5.  The patient will follow up with my nurse practitioner next week for treatment indication as well as to initiate treatment.  6.  She will follow-up with me in 4 weeks for cycle #2.  7.  I will repeat the patient scans after cycle #3.  8. We will continue omeprazole for GERD.   9.  I will start the patient on Zofran as needed for chemotherapy-induced nausea.  10.  I did go over potential side effects of treatment including alopecia, nausea vomiting, pancytopenia, neutropenic fever, and potential death.  Rupesh Cao MD  12/9/2020

## 2020-12-13 DIAGNOSIS — R22.42 LOCALIZED SWELLING, MASS AND LUMP, LEFT LOWER LIMB: ICD-10-CM

## 2020-12-13 DIAGNOSIS — M25.472 LEFT ANKLE SWELLING: ICD-10-CM

## 2020-12-13 DIAGNOSIS — M25.472 ANKLE SWELLING, LEFT: ICD-10-CM

## 2020-12-14 ENCOUNTER — HOSPITAL ENCOUNTER (OUTPATIENT)
Dept: ULTRASOUND IMAGING | Facility: HOSPITAL | Age: 70
End: 2020-12-14

## 2020-12-14 RX ORDER — FUROSEMIDE 20 MG/1
20 TABLET ORAL DAILY PRN
Qty: 5 TABLET | Refills: 0 | OUTPATIENT
Start: 2020-12-14

## 2020-12-15 RX ORDER — OMEPRAZOLE 40 MG/1
CAPSULE, DELAYED RELEASE ORAL
Qty: 90 CAPSULE | Refills: 2 | Status: SHIPPED | OUTPATIENT
Start: 2020-12-15

## 2020-12-17 ENCOUNTER — OFFICE VISIT (OUTPATIENT)
Dept: ONCOLOGY | Facility: CLINIC | Age: 70
End: 2020-12-17

## 2020-12-17 ENCOUNTER — INFUSION (OUTPATIENT)
Dept: ONCOLOGY | Facility: HOSPITAL | Age: 70
End: 2020-12-17

## 2020-12-17 VITALS
RESPIRATION RATE: 12 BRPM | HEIGHT: 62 IN | SYSTOLIC BLOOD PRESSURE: 127 MMHG | WEIGHT: 106 LBS | DIASTOLIC BLOOD PRESSURE: 80 MMHG | BODY MASS INDEX: 19.51 KG/M2 | OXYGEN SATURATION: 99 % | TEMPERATURE: 98.2 F | HEART RATE: 107 BPM

## 2020-12-17 DIAGNOSIS — G63 NEUROPATHY ASSOCIATED WITH CANCER (HCC): ICD-10-CM

## 2020-12-17 DIAGNOSIS — F41.9 ANXIETY: ICD-10-CM

## 2020-12-17 DIAGNOSIS — C34.90 SCLC (SMALL CELL LUNG CARCINOMA) (HCC): Primary | ICD-10-CM

## 2020-12-17 DIAGNOSIS — G89.3 CANCER RELATED PAIN: ICD-10-CM

## 2020-12-17 DIAGNOSIS — C80.1 NEUROPATHY ASSOCIATED WITH CANCER (HCC): ICD-10-CM

## 2020-12-17 LAB
ALBUMIN SERPL-MCNC: 2 G/DL (ref 3.5–5.2)
ALBUMIN/GLOB SERPL: 0.7 G/DL
ALP SERPL-CCNC: 143 U/L (ref 39–117)
ALT SERPL W P-5'-P-CCNC: 10 U/L (ref 1–33)
ANION GAP SERPL CALCULATED.3IONS-SCNC: 4.9 MMOL/L (ref 5–15)
AST SERPL-CCNC: 11 U/L (ref 1–32)
BASOPHILS # BLD AUTO: 0.04 10*3/MM3 (ref 0–0.2)
BASOPHILS NFR BLD AUTO: 0.4 % (ref 0–1.5)
BILIRUB SERPL-MCNC: 0.3 MG/DL (ref 0–1.2)
BUN SERPL-MCNC: 15 MG/DL (ref 8–23)
BUN/CREAT SERPL: 23.8 (ref 7–25)
CALCIUM SPEC-SCNC: 7.7 MG/DL (ref 8.6–10.5)
CHLORIDE SERPL-SCNC: 100 MMOL/L (ref 98–107)
CO2 SERPL-SCNC: 28.1 MMOL/L (ref 22–29)
CREAT SERPL-MCNC: 0.63 MG/DL (ref 0.57–1)
DEPRECATED RDW RBC AUTO: 67.2 FL (ref 37–54)
EOSINOPHIL # BLD AUTO: 0 10*3/MM3 (ref 0–0.4)
EOSINOPHIL NFR BLD AUTO: 0 % (ref 0.3–6.2)
ERYTHROCYTE [DISTWIDTH] IN BLOOD BY AUTOMATED COUNT: 19 % (ref 12.3–15.4)
GFR SERPL CREATININE-BSD FRML MDRD: 94 ML/MIN/1.73
GLOBULIN UR ELPH-MCNC: 2.9 GM/DL
GLUCOSE SERPL-MCNC: 70 MG/DL (ref 65–99)
HCT VFR BLD AUTO: 32.3 % (ref 34–46.6)
HGB BLD-MCNC: 10.4 G/DL (ref 12–15.9)
IMM GRANULOCYTES # BLD AUTO: 0.07 10*3/MM3 (ref 0–0.05)
IMM GRANULOCYTES NFR BLD AUTO: 0.7 % (ref 0–0.5)
LYMPHOCYTES # BLD AUTO: 1.85 10*3/MM3 (ref 0.7–3.1)
LYMPHOCYTES NFR BLD AUTO: 18.6 % (ref 19.6–45.3)
MAGNESIUM SERPL-MCNC: 1.8 MG/DL (ref 1.6–2.4)
MCH RBC QN AUTO: 31 PG (ref 26.6–33)
MCHC RBC AUTO-ENTMCNC: 32.2 G/DL (ref 31.5–35.7)
MCV RBC AUTO: 96.4 FL (ref 79–97)
MONOCYTES # BLD AUTO: 0.95 10*3/MM3 (ref 0.1–0.9)
MONOCYTES NFR BLD AUTO: 9.5 % (ref 5–12)
NEUTROPHILS NFR BLD AUTO: 7.06 10*3/MM3 (ref 1.7–7)
NEUTROPHILS NFR BLD AUTO: 70.8 % (ref 42.7–76)
NRBC BLD AUTO-RTO: 0 /100 WBC (ref 0–0.2)
PLATELET # BLD AUTO: 295 10*3/MM3 (ref 140–450)
PMV BLD AUTO: 8.5 FL (ref 6–12)
POTASSIUM SERPL-SCNC: 4.2 MMOL/L (ref 3.5–5.2)
PROT SERPL-MCNC: 4.9 G/DL (ref 6–8.5)
RBC # BLD AUTO: 3.35 10*6/MM3 (ref 3.77–5.28)
SODIUM SERPL-SCNC: 133 MMOL/L (ref 136–145)
WBC # BLD AUTO: 9.97 10*3/MM3 (ref 3.4–10.8)

## 2020-12-17 PROCEDURE — 80053 COMPREHEN METABOLIC PANEL: CPT

## 2020-12-17 PROCEDURE — 36415 COLL VENOUS BLD VENIPUNCTURE: CPT

## 2020-12-17 PROCEDURE — 99215 OFFICE O/P EST HI 40 MIN: CPT | Performed by: NURSE PRACTITIONER

## 2020-12-17 PROCEDURE — C9399 UNCLASSIFIED DRUGS OR BIOLOG: HCPCS | Performed by: NURSE PRACTITIONER

## 2020-12-17 PROCEDURE — 96376 TX/PRO/DX INJ SAME DRUG ADON: CPT

## 2020-12-17 PROCEDURE — 85025 COMPLETE CBC W/AUTO DIFF WBC: CPT

## 2020-12-17 PROCEDURE — 83735 ASSAY OF MAGNESIUM: CPT

## 2020-12-17 PROCEDURE — 25010000002 PALONOSETRON 0.25 MG/5ML SOLUTION PREFILLED SYRINGE: Performed by: INTERNAL MEDICINE

## 2020-12-17 PROCEDURE — 25010000003 HEPARIN LOCK FLUSH PER 10 UNITS: Performed by: INTERNAL MEDICINE

## 2020-12-17 PROCEDURE — 96413 CHEMO IV INFUSION 1 HR: CPT

## 2020-12-17 PROCEDURE — 25010000002 LORAZEPAM PER 2 MG: Performed by: NURSE PRACTITIONER

## 2020-12-17 PROCEDURE — 25010000002 LURBINECTEDIN 4 MG RECONSTITUTED SOLUTION 1 EACH VIAL: Performed by: NURSE PRACTITIONER

## 2020-12-17 PROCEDURE — 25010000002 DEXAMETHASONE PER 1 MG: Performed by: INTERNAL MEDICINE

## 2020-12-17 PROCEDURE — 96375 TX/PRO/DX INJ NEW DRUG ADDON: CPT

## 2020-12-17 RX ORDER — HEPARIN SODIUM (PORCINE) LOCK FLUSH IV SOLN 100 UNIT/ML 100 UNIT/ML
500 SOLUTION INTRAVENOUS AS NEEDED
Status: DISCONTINUED | OUTPATIENT
Start: 2020-12-17 | End: 2020-12-17 | Stop reason: HOSPADM

## 2020-12-17 RX ORDER — HEPARIN SODIUM (PORCINE) LOCK FLUSH IV SOLN 100 UNIT/ML 100 UNIT/ML
500 SOLUTION INTRAVENOUS AS NEEDED
Status: CANCELLED | OUTPATIENT
Start: 2020-12-17

## 2020-12-17 RX ORDER — LORAZEPAM 2 MG/ML
0.5 INJECTION INTRAMUSCULAR ONCE
Status: COMPLETED | OUTPATIENT
Start: 2020-12-17 | End: 2020-12-17

## 2020-12-17 RX ORDER — LORAZEPAM 2 MG/ML
0.5 INJECTION INTRAMUSCULAR ONCE
Status: CANCELLED
Start: 2020-12-17

## 2020-12-17 RX ORDER — PALONOSETRON 0.05 MG/ML
0.25 INJECTION, SOLUTION INTRAVENOUS ONCE
Status: COMPLETED | OUTPATIENT
Start: 2020-12-17 | End: 2020-12-17

## 2020-12-17 RX ORDER — SODIUM CHLORIDE 0.9 % (FLUSH) 0.9 %
10 SYRINGE (ML) INJECTION AS NEEDED
Status: DISCONTINUED | OUTPATIENT
Start: 2020-12-17 | End: 2020-12-17 | Stop reason: HOSPADM

## 2020-12-17 RX ORDER — SODIUM CHLORIDE 0.9 % (FLUSH) 0.9 %
10 SYRINGE (ML) INJECTION AS NEEDED
Status: CANCELLED | OUTPATIENT
Start: 2020-12-17

## 2020-12-17 RX ORDER — LEVOTHYROXINE SODIUM 0.07 MG/1
TABLET ORAL
Qty: 90 TABLET | Refills: 1 | OUTPATIENT
Start: 2020-12-17

## 2020-12-17 RX ORDER — SODIUM CHLORIDE 9 MG/ML
250 INJECTION, SOLUTION INTRAVENOUS ONCE
Status: DISCONTINUED | OUTPATIENT
Start: 2020-12-17 | End: 2020-12-17 | Stop reason: HOSPADM

## 2020-12-17 RX ADMIN — HEPARIN 500 UNITS: 100 SYRINGE at 12:56

## 2020-12-17 RX ADMIN — DEXAMETHASONE SODIUM PHOSPHATE 12 MG: 4 INJECTION, SOLUTION INTRAMUSCULAR; INTRAVENOUS at 10:58

## 2020-12-17 RX ADMIN — LURBINECTEDIN 3.5 MG: 0.5 INJECTION, POWDER, LYOPHILIZED, FOR SOLUTION INTRAVENOUS at 11:46

## 2020-12-17 RX ADMIN — SODIUM CHLORIDE, PRESERVATIVE FREE 10 ML: 5 INJECTION INTRAVENOUS at 12:55

## 2020-12-17 RX ADMIN — LORAZEPAM 0.5 MG: 2 INJECTION, SOLUTION INTRAMUSCULAR; INTRAVENOUS at 11:01

## 2020-12-17 RX ADMIN — PALONOSETRON 0.25 MG: 0.25 INJECTION, SOLUTION INTRAVENOUS at 10:58

## 2020-12-17 NOTE — PROGRESS NOTES
DATE OF VISIT: 12/17/2020    REASON FOR VISIT: Followup for extensive stage small cell lung cancer     HISTORY OF PRESENT ILLNESS: The patient is a very pleasant 70 y.o. female with past medical history significant for extensive stage small cell lung cancer diagnosed December 2, 2019.  She was started on palliative treatment with carboplatin and etoposide and Tecentriq December 15, 2019. Tecentriq maintenance started 03/11/2020.  The patient had progressive disease and she was switched to Opdivo plus Yervoy.  She completed 4 cycles August 13, 2020.  This was followed by Opdivo single agent.  Repeat scans done December 7, 2020 revealed progressive disease.  The patient is here today for follow up visit.     SUBJECTIVE: Filippo is here today by herself.  She denies any fever chills night sweats.  Nausea is stable. She is more nervous today and feels like that if she eats it will make her sick. She is anxious about starting new treatment.  Her appetite is stable. She is doing protein shakes at least twice a day.  She is moved to live with a different sister and she has been eating more since then.  Her niece makes her protein shakes.     PAST MEDICAL HISTORY/SOCIAL HISTORY/FAMILY HISTORY: Reviewed by me and unchanged from my documentation done on 12/17/20.    Review of Systems   Constitutional: Positive for fatigue. Negative for activity change, appetite change, chills, fever and unexpected weight change.   HENT: Negative for hearing loss, mouth sores, nosebleeds, sore throat and trouble swallowing.    Eyes: Negative for visual disturbance.   Respiratory: Negative for cough, chest tightness, shortness of breath and wheezing.    Cardiovascular: Negative for chest pain and palpitations.   Gastrointestinal: Positive for constipation, nausea and vomiting. Negative for abdominal distention, abdominal pain, blood in stool, diarrhea and rectal pain.   Endocrine: Negative for cold intolerance and heat intolerance.    Genitourinary: Negative for difficulty urinating, dysuria, frequency and urgency.   Musculoskeletal: Negative for arthralgias, back pain, gait problem, joint swelling and myalgias.   Skin: Negative for rash.   Neurological: Negative for dizziness, tremors, syncope, weakness, light-headedness, numbness and headaches.   Hematological: Negative for adenopathy. Does not bruise/bleed easily.   Psychiatric/Behavioral: Negative for confusion, sleep disturbance and suicidal ideas. The patient is not nervous/anxious.          Current Outpatient Medications:   •  acetaminophen (TYLENOL) 500 MG tablet, Take 500 mg by mouth Every 6 (Six) Hours As Needed for Mild Pain ., Disp: , Rfl:   •  albuterol sulfate HFA (Ventolin HFA) 108 (90 Base) MCG/ACT inhaler, Inhale 2 puffs Every 4 (Four) Hours As Needed for Wheezing or Shortness of Air., Disp: 18 g, Rfl: 5  •  amLODIPine (NORVASC) 5 MG tablet, Take 1 tablet by mouth Daily., Disp: 90 tablet, Rfl: 3  •  baclofen (LIORESAL) 10 MG tablet, START- ONE TAB NIGHTLY X7 DAYS, THEN 1 TAB 2X A DAY X7 DAYS, THEN 1 TAB 3X A DAY, Disp: 270 tablet, Rfl: 1  •  Calcium Carbonate-Vitamin D (CALCIUM-CARB 600 + D) 600-125 MG-UNIT tablet, Take 500 mg by mouth 2 (Two) Times a Day., Disp: 60 each, Rfl: 5  •  diclofenac (VOLTAREN) 75 MG EC tablet, Take 1 tablet by mouth Daily As Needed (pain)., Disp: 90 tablet, Rfl: 3  •  docusate sodium (COLACE) 100 MG capsule, Take 1 tablet twice daily, Disp: 60 capsule, Rfl: 3  •  furosemide (LASIX) 20 MG tablet, Take 1 tablet by mouth Daily As Needed (leg swelling) for up to 5 doses., Disp: 5 tablet, Rfl: 0  •  gabapentin (NEURONTIN) 400 MG capsule, Take 1 capsule by mouth Daily With Breakfast & Lunch. May also take 2 capsules At Night As Needed (neuropathy, pain)., Disp: 270 capsule, Rfl: 1  •  ipratropium-albuterol (DUO-NEB) 0.5-2.5 mg/3 ml nebulizer, Take 3 mL by nebulization 4 (Four) Times a Day As Needed for Wheezing or Shortness of Air., Disp: 120 mL, Rfl: 5  •   levothyroxine (SYNTHROID, LEVOTHROID) 88 MCG tablet, Take 1 tablet by mouth Daily. Indications: Underactive Thyroid, Disp: 90 tablet, Rfl: 3  •  lidocaine (LIDODERM) 5 %, Place 1 patch on the skin as directed by provider Daily. Remove & Discard patch within 12 hours or as directed by MD, Disp: 90 each, Rfl: 3  •  Lidocaine Viscous HCl (XYLOCAINE) 2 % solution, , Disp: , Rfl:   •  lidocaine-prilocaine (EMLA) 2.5-2.5 % cream, Apply  topically to the appropriate area as directed As Needed (45-60 minutes prior to port access.  Cover with saran/plastic wrap.)., Disp: 30 g, Rfl: 3  •  LORazepam (Ativan) 1 MG tablet, Take 1 tablet by mouth Every 8 (Eight) Hours As Needed for Anxiety., Disp: 90 tablet, Rfl: 2  •  magic mouthwash oral suspension, Swish and spit (or swallow) 1-2 Teaspoonfuls (5-10ml) 4 times a day as needed, Disp: 240 mL, Rfl: 3  •  methylPREDNISolone (MEDROL) 4 MG dose pack, Take as directed on package instructions., Disp: 21 tablet, Rfl: 0  •  Nebulizer device, 1 Device Take As Directed., Disp: 1 each, Rfl: 0  •  OLANZapine (zyPREXA) 5 MG tablet, Take 1 tablet by mouth Every Night., Disp: 30 tablet, Rfl: 5  •  omeprazole (priLOSEC) 40 MG capsule, TAKE 1 CAPSULE BY MOUTH EVERY DAY, Disp: 90 capsule, Rfl: 2  •  ondansetron (ZOFRAN) 8 MG tablet, Take 1 tablet by mouth 3 (Three) Times a Day As Needed for Nausea or Vomiting., Disp: 30 tablet, Rfl: 5  •  ondansetron ODT (ZOFRAN-ODT) 8 MG disintegrating tablet, PLACE 1 TABLET ON THE TONGUE EVERY 8 (EIGHT) HOURS AS NEEDED FOR NAUSEA OR VOMITING., Disp: 30 tablet, Rfl: 4  •  pravastatin (PRAVACHOL) 40 MG tablet, Take 1 tablet by mouth Daily., Disp: 90 tablet, Rfl: 3  •  prochlorperazine (COMPAZINE) 10 MG tablet, Take 1 tablet by mouth Every 6 (Six) Hours As Needed for Nausea or Vomiting., Disp: 60 tablet, Rfl: 5  •  Stiolto Respimat 2.5-2.5 MCG/ACT aerosol solution inhaler, INHALE 2 PUFFS BY MOUTH DAILY, Disp: 1 inhaler, Rfl: 1  •  traMADol (ULTRAM) 50 MG tablet,  "Take 1 tablet by mouth Every 8 (Eight) Hours As Needed for Severe Pain ., Disp: 270 tablet, Rfl: 1    Current Facility-Administered Medications:   •  heparin injection 500 Units, 500 Units, Intracatheter, Q8H PRN, Kathe Higuera, APRN, 500 Units at 11/17/20 1116    PHYSICAL EXAMINATION:   /80   Pulse 107   Temp 98.2 °F (36.8 °C) (Temporal)   Resp 12   Ht 157.5 cm (62\")   Wt 48.1 kg (106 lb)   SpO2 99%   BMI 19.39 kg/m²    ECOG Performance Status: 1 - Symptomatic but completely ambulatory  General Appearance:  alert, cooperative, no apparent distress, appears stated age and normal weight   Neurologic/Psychiatric: A&O x 3, gait steady, appropriate affect, strength 5/5 in all muscle groups   HEENT:  Normocephalic, without obvious abnormality, mucous membranes moist   Neck: Supple, symmetrical, trachea midline, no adenopathy;  No thyromegaly, masses, or tenderness   Lungs:   Clear to auscultation bilaterally; respirations regular, even, and unlabored bilaterally   Heart:  Regular rate and rhythm, no murmurs appreciated   Abdomen:   Soft, non-tender, non-distended and no organomegaly   Lymph nodes: No cervical, supraclavicular, inguinal or axillary adenopathy noted.  Submandibular nodule noted approximately 1cm   Extremities: Normal, atraumatic; no clubbing, cyanosis, or edema    Skin: No rashes, ulcers, or suspicious lesions noted     Lab on 12/09/2020   Component Date Value Ref Range Status   • TSH 12/09/2020 6.560* 0.270 - 4.200 uIU/mL Final   • Free T4 12/09/2020 1.29  0.93 - 1.70 ng/dL Final    T4 results may be falsely increased if patient taking Biotin.   • Glucose 12/09/2020 83  65 - 99 mg/dL Final   • BUN 12/09/2020 16  8 - 23 mg/dL Final   • Creatinine 12/09/2020 0.61  0.57 - 1.00 mg/dL Final   • Sodium 12/09/2020 136  136 - 145 mmol/L Final   • Potassium 12/09/2020 3.5  3.5 - 5.2 mmol/L Final   • Chloride 12/09/2020 100  98 - 107 mmol/L Final   • CO2 12/09/2020 29.1* 22.0 - 29.0 mmol/L Final   • " Calcium 12/09/2020 7.9* 8.6 - 10.5 mg/dL Final   • Total Protein 12/09/2020 5.0* 6.0 - 8.5 g/dL Final   • Albumin 12/09/2020 2.20* 3.50 - 5.20 g/dL Final   • ALT (SGPT) 12/09/2020 14  1 - 33 U/L Final   • AST (SGOT) 12/09/2020 14  1 - 32 U/L Final   • Alkaline Phosphatase 12/09/2020 138* 39 - 117 U/L Final   • Total Bilirubin 12/09/2020 0.2  0.0 - 1.2 mg/dL Final   • eGFR Non African Amer 12/09/2020 98  >60 mL/min/1.73 Final   • Globulin 12/09/2020 2.8  gm/dL Final   • A/G Ratio 12/09/2020 0.8  g/dL Final   • BUN/Creatinine Ratio 12/09/2020 26.2* 7.0 - 25.0 Final   • Anion Gap 12/09/2020 6.9  5.0 - 15.0 mmol/L Final   • Magnesium 12/09/2020 1.8  1.6 - 2.4 mg/dL Final   • WBC 12/09/2020 9.44  3.40 - 10.80 10*3/mm3 Final   • RBC 12/09/2020 3.26* 3.77 - 5.28 10*6/mm3 Final   • Hemoglobin 12/09/2020 10.0* 12.0 - 15.9 g/dL Final   • Hematocrit 12/09/2020 31.0* 34.0 - 46.6 % Final   • MCV 12/09/2020 95.1  79.0 - 97.0 fL Final   • MCH 12/09/2020 30.7  26.6 - 33.0 pg Final   • MCHC 12/09/2020 32.3  31.5 - 35.7 g/dL Final   • RDW 12/09/2020 19.4* 12.3 - 15.4 % Final   • RDW-SD 12/09/2020 67.5* 37.0 - 54.0 fl Final   • MPV 12/09/2020 8.8  6.0 - 12.0 fL Final   • Platelets 12/09/2020 355  140 - 450 10*3/mm3 Final   • Neutrophil % 12/09/2020 63.8  42.7 - 76.0 % Final   • Lymphocyte % 12/09/2020 25.8  19.6 - 45.3 % Final   • Monocyte % 12/09/2020 9.6  5.0 - 12.0 % Final   • Eosinophil % 12/09/2020 0.1* 0.3 - 6.2 % Final   • Basophil % 12/09/2020 0.2  0.0 - 1.5 % Final   • Immature Grans % 12/09/2020 0.5  0.0 - 0.5 % Final   • Neutrophils, Absolute 12/09/2020 6.01  1.70 - 7.00 10*3/mm3 Final   • Lymphocytes, Absolute 12/09/2020 2.44  0.70 - 3.10 10*3/mm3 Final   • Monocytes, Absolute 12/09/2020 0.91* 0.10 - 0.90 10*3/mm3 Final   • Eosinophils, Absolute 12/09/2020 0.01  0.00 - 0.40 10*3/mm3 Final   • Basophils, Absolute 12/09/2020 0.02  0.00 - 0.20 10*3/mm3 Final   • Immature Grans, Absolute 12/09/2020 0.05  0.00 - 0.05 10*3/mm3  Final   • nRBC 12/09/2020 0.0  0.0 - 0.2 /100 WBC Final      Ct Soft Tissue Neck With Contrast    Result Date: 12/7/2020  Narrative: PROCEDURE: CT SOFT TISSUE NECK W CONTRAST-, CT CHEST W CONTRAST-, CT ABDOMEN PELVIS W CONTRAST-  HISTORY: Follow up extensive stage small cell lung cancer; C34.90-Malignant neoplasm of unspecified part of unspecified bronchus or lung  COMPARISON: 08/31/2020.  PROCEDURE: Axial images were obtained from the skull base to the pubic symphysis following the administration of Isovue-300 contrast.  FINDINGS:  NECK: Soft tissue windows redemonstrate right jugular chain lymph nodes which are essentially unchanged. For example on image 34 there is a 1.8 cm right jugular chain lymph node. On image 42 there is a 2 cm jugular chain lymph node. There is a newly enlarged left posterior triangle lymph node on image 35 measuring 9 mm. No other new or enlarging lymph nodes are seen within the neck. The right submandibular gland has a lobular contour, however is unchanged. No mucosal lesion is seen in the nasopharynx, oropharynx or larynx. The trachea is midline. The major neck vasculature is patent. Bone windows reveal no lytic or destructive lesions.  CHEST: Soft tissue windows redemonstrate subcentimeter mediastinal lymph nodes which are unchanged. No new or enlarging adenopathy is seen in the chest. The heart is normal in size. There are no pleural or pericardial effusions. Lung windows redemonstrate an area of suspected scarring in the right lung apex. No new or enlarging nodules are identified. Atelectasis is seen in the left lung base. Bone windows reveal no lytic or destructive lesions..  ABDOMEN: There has been significant interval increase in size of a right adrenal metastasis which measures 8.9 x 6.8 cm and demonstrates central necrosis. This previously measured 4.6 x 2.3 cm. There is a new left adrenal nodule measuring 1.5 cm. A small, 1.5 cm hypodense lesion medial right lobe of the liver on  image 15 is unchanged. No new hepatic lesions are identified. The kidneys, spleen and pancreas are unremarkable. The abdominal portions of the GI tract are within normal limits. There are shotty appearing lymph nodes in the retroperitoneum which are unchanged. There is a small amount of ascites within the abdomen which is new compared to the prior exam.  PELVIS: The pelvic viscera is unremarkable. There is a moderate amount of stool within the colon suggesting constipation. There is a small amount of free fluid in the pelvis. Bone windows reveal no lytic or destructive lesions.      Impression: 1. Significant interval increase in size of the right adrenal mass measuring 8.9 cm on today's exam. There is a new 1.5 cm left adrenal nodule consistent with metastatic disease. Shotty appearing lymph nodes in the retroperitoneum are also concerning for metastatic disease, however these are unchanged. 2. Newly enlarged left posterior triangle lymph node in the neck. 3. Stable right jugular chain adenopathy.         This study was performed with techniques to keep radiation doses as low as reasonably achievable (ALARA). Individualized dose reduction techniques using automated exposure control or adjustment of mA and/or kV according to the patient size were employed.    This report was finalized on 12/7/2020 1:54 PM by Pamela Oliveros M.D..    Ct Chest With Contrast    Result Date: 12/7/2020  Narrative: PROCEDURE: CT SOFT TISSUE NECK W CONTRAST-, CT CHEST W CONTRAST-, CT ABDOMEN PELVIS W CONTRAST-  HISTORY: Follow up extensive stage small cell lung cancer; C34.90-Malignant neoplasm of unspecified part of unspecified bronchus or lung  COMPARISON: 08/31/2020.  PROCEDURE: Axial images were obtained from the skull base to the pubic symphysis following the administration of Isovue-300 contrast.  FINDINGS:  NECK: Soft tissue windows redemonstrate right jugular chain lymph nodes which are essentially unchanged. For example on  image 34 there is a 1.8 cm right jugular chain lymph node. On image 42 there is a 2 cm jugular chain lymph node. There is a newly enlarged left posterior triangle lymph node on image 35 measuring 9 mm. No other new or enlarging lymph nodes are seen within the neck. The right submandibular gland has a lobular contour, however is unchanged. No mucosal lesion is seen in the nasopharynx, oropharynx or larynx. The trachea is midline. The major neck vasculature is patent. Bone windows reveal no lytic or destructive lesions.  CHEST: Soft tissue windows redemonstrate subcentimeter mediastinal lymph nodes which are unchanged. No new or enlarging adenopathy is seen in the chest. The heart is normal in size. There are no pleural or pericardial effusions. Lung windows redemonstrate an area of suspected scarring in the right lung apex. No new or enlarging nodules are identified. Atelectasis is seen in the left lung base. Bone windows reveal no lytic or destructive lesions..  ABDOMEN: There has been significant interval increase in size of a right adrenal metastasis which measures 8.9 x 6.8 cm and demonstrates central necrosis. This previously measured 4.6 x 2.3 cm. There is a new left adrenal nodule measuring 1.5 cm. A small, 1.5 cm hypodense lesion medial right lobe of the liver on image 15 is unchanged. No new hepatic lesions are identified. The kidneys, spleen and pancreas are unremarkable. The abdominal portions of the GI tract are within normal limits. There are shotty appearing lymph nodes in the retroperitoneum which are unchanged. There is a small amount of ascites within the abdomen which is new compared to the prior exam.  PELVIS: The pelvic viscera is unremarkable. There is a moderate amount of stool within the colon suggesting constipation. There is a small amount of free fluid in the pelvis. Bone windows reveal no lytic or destructive lesions.      Impression: 1. Significant interval increase in size of the right  adrenal mass measuring 8.9 cm on today's exam. There is a new 1.5 cm left adrenal nodule consistent with metastatic disease. Shotty appearing lymph nodes in the retroperitoneum are also concerning for metastatic disease, however these are unchanged. 2. Newly enlarged left posterior triangle lymph node in the neck. 3. Stable right jugular chain adenopathy.         This study was performed with techniques to keep radiation doses as low as reasonably achievable (ALARA). Individualized dose reduction techniques using automated exposure control or adjustment of mA and/or kV according to the patient size were employed.    This report was finalized on 12/7/2020 1:54 PM by Pamela Oliveros M.D..    Mri Brain With & Without Contrast    Result Date: 12/7/2020  Narrative: PROCEDURE: MRI BRAIN W WO CONTRAST-  HISTORY: Brain metastases suspected  PROCEDURE: Multiplanar multisequence imaging of the brain was performed both before and following the administration of 15 mL MultiHance intravenous contrast.  COMPARISON: 07/17/2020.  FINDINGS: There is generalized cerebral volume loss. FLAIR hyperintensities in the periventricular white matter of both cerebral hemispheres is consistent with chronic small vessel ischemic change. There is no mass, mass effect or midline shift. There is no hydrocephalus. There are no areas of restricted diffusion. There is no pathologic contrast enhancement.  The midbrain, josephnie, cerebellum and craniocervical junction are unremarkable. The sella and pituitary gland are within normal limits. The major intracranial vasculature demonstrates the expected flow related signal. The paranasal sinuses are clear. Limited images of the soft tissues of the neck demonstrate enlarged right jugular chain lymph nodes.      Impression: No evidence of metastatic disease within the brain.    This report was finalized on 12/7/2020 1:12 PM by Pamela Oliveros M.D..    Ct Abdomen Pelvis With Contrast    Result Date:  12/7/2020  Narrative: PROCEDURE: CT SOFT TISSUE NECK W CONTRAST-, CT CHEST W CONTRAST-, CT ABDOMEN PELVIS W CONTRAST-  HISTORY: Follow up extensive stage small cell lung cancer; C34.90-Malignant neoplasm of unspecified part of unspecified bronchus or lung  COMPARISON: 08/31/2020.  PROCEDURE: Axial images were obtained from the skull base to the pubic symphysis following the administration of Isovue-300 contrast.  FINDINGS:  NECK: Soft tissue windows redemonstrate right jugular chain lymph nodes which are essentially unchanged. For example on image 34 there is a 1.8 cm right jugular chain lymph node. On image 42 there is a 2 cm jugular chain lymph node. There is a newly enlarged left posterior triangle lymph node on image 35 measuring 9 mm. No other new or enlarging lymph nodes are seen within the neck. The right submandibular gland has a lobular contour, however is unchanged. No mucosal lesion is seen in the nasopharynx, oropharynx or larynx. The trachea is midline. The major neck vasculature is patent. Bone windows reveal no lytic or destructive lesions.  CHEST: Soft tissue windows redemonstrate subcentimeter mediastinal lymph nodes which are unchanged. No new or enlarging adenopathy is seen in the chest. The heart is normal in size. There are no pleural or pericardial effusions. Lung windows redemonstrate an area of suspected scarring in the right lung apex. No new or enlarging nodules are identified. Atelectasis is seen in the left lung base. Bone windows reveal no lytic or destructive lesions..  ABDOMEN: There has been significant interval increase in size of a right adrenal metastasis which measures 8.9 x 6.8 cm and demonstrates central necrosis. This previously measured 4.6 x 2.3 cm. There is a new left adrenal nodule measuring 1.5 cm. A small, 1.5 cm hypodense lesion medial right lobe of the liver on image 15 is unchanged. No new hepatic lesions are identified. The kidneys, spleen and pancreas are  unremarkable. The abdominal portions of the GI tract are within normal limits. There are shotty appearing lymph nodes in the retroperitoneum which are unchanged. There is a small amount of ascites within the abdomen which is new compared to the prior exam.  PELVIS: The pelvic viscera is unremarkable. There is a moderate amount of stool within the colon suggesting constipation. There is a small amount of free fluid in the pelvis. Bone windows reveal no lytic or destructive lesions.      Impression: 1. Significant interval increase in size of the right adrenal mass measuring 8.9 cm on today's exam. There is a new 1.5 cm left adrenal nodule consistent with metastatic disease. Shotty appearing lymph nodes in the retroperitoneum are also concerning for metastatic disease, however these are unchanged. 2. Newly enlarged left posterior triangle lymph node in the neck. 3. Stable right jugular chain adenopathy.         This study was performed with techniques to keep radiation doses as low as reasonably achievable (ALARA). Individualized dose reduction techniques using automated exposure control or adjustment of mA and/or kV according to the patient size were employed.    This report was finalized on 12/7/2020 1:54 PM by Pamela Oliveros M.D..  (  Chemotherapy Regimen:   Treatment Plans     Name Type Plan dates Plan Provider         Active     ONCOLOGY TREATMENT  Present                     TOPICS EDUCATION PROVIDED COMMENTS   ANEMIA:  role of RBC, cause, s/s, ways to manage, role of transfusion [x]    THROMBOCYTOPENIA:  role of platelet, cause, s/s, ways to prevent bleeding, things to avoid, when to seek help [x]    NEUTROPENIA:  role of WBC, cause, infection precautions, s/s of infection, when to call MD [x]    NUTRITION & APPETITE CHANGES:  importance of maintaining healthy diet & weight, ways to manage to improve intake, dietary consult, exercise regimen [x]    DIARRHEA:  causes, s/s of dehydration, ways to manage,  dietary changes, when to call MD [x]    CONSTIPATION:  causes, ways to manage, dietary changes, when to call MD [x]    NAUSEA & VOMITING:  cause, use of antiemetics, dietary changes, when to call MD [x]    MOUTH SORES:  causes, oral care, ways to manage [x]    ALOPECIA:  cause, ways to manage, resources [x]    INFERTILITY & SEXUALITY:  causes, fertility preservation options, sexuality changes, ways to manage, importance of birth control [x]    NERVOUS SYSTEM CHANGES:  causes, s/s, neuropathies, cognitive changes, ways to manage [x]    PAIN:  causes, ways to manage [x] ????   SKIN & NAIL CHANGES:  cause, s/s, ways to manage [x]    ORGAN TOXICITIES:  cause, s/s, need for diagnostic tests, labs, when to notify MD [x]    SURVIVORSHIP:  distress, distress assessment, secondary malignancies, early/late effects, follow-up, social issues, social support [x]    HOME CARE:  use of spill kits, storing of PO chemo, how to manage bodily fluids [x]    MISCELLANEOUS:  drug interactions, administration, vesicant, et [x]      ASSESSMENT: The patient is a very pleasant 70 y.o. female  with right upper lobe extensive stage small cell lung cancer     PROBLEM LIST:   1.  Right upper lobe extensive stage small cell lung cancer, [id number] stage IVb:  A.  Presented with right lower ribs pain  B.  CT chest revealed 3.5 cm right upper lobe lung mass, right adrenal nodule, mediastinal adenopathy, and left cervical lymphadenopathy.  C.  Status post left cervical lymph node biopsy done by Dr. Owen December 2, 2019  D.  Pathology consistent with small cell carcinoma  E.  Started palliative treatment with carboplatin and etoposide and Tecentriq December 15, 2019, status post 8 cycles  F.  Progressive disease documented CT scans done on June 1, 2020 with a new cervical lymphadenopathy and relapsed right adrenal mass  G.  Started Opdivo plus Yervoy Brenda 10, 2020, status post 4 cycle followed by Opdivo single agent with unfortunately progressive  disease documented on CT scans done December 7, 2020.  H.  Zepzelca with 25% dose reduction started December 16, 2020  2.  COPD  3.  Hypertension  4.  Hypercholesterolemia  5.  Hypothyroid  6.  GERD  7.  Progressive nausea and vomiting  8. Anxiety  9. Cancer related pain  10. Treatment related neuropathy    PLAN:  1. We will proceed with Zepzelca once every 3 weeks with 25% dose reduction given her borderline performance status and previous chemotherapy and immunotherapy exposure.  2. She will continue to keep her feet elevated and compression stockings for bilateral lower extremity edema.   3. I will continue to monitor the patient blood work including blood counts kidney function liver function electrolytes function.  4.  She will follow-up in 3 weeks for cycle #2.  5.  I will repeat the patient scans after cycle #3.  6. We will continue omeprazole for GERD.   7.  I will continue patient on Zofran as needed for chemotherapy-induced nausea as well as phenergan.  8. We reviewed potential side effects of treatment including alopecia, nausea vomiting, pancytopenia, neutropenic fever, and potential death.  9. We will continue ativan for anxiety. I will add a dose today prior to treatment.   10. We will continue ultram as needed for cancer related pain. She will contact office if pain worsens or is not controlled.   11. We will continue gabapentin for treatment related neuropathy.   12. The patient and I have reviewed their cancer diagnosis and scheduled treatment plan.  Chemotherapy teaching was also completed today as documented above. Adequate time was given to answer all questions to her satisfaction. Patient and family are aware of their care team members and contact information if they have questions or problems throughout the treatment course.     Kathe Higuera, APRN  12/17/2020

## 2020-12-18 RX ORDER — TIOTROPIUM BROMIDE AND OLODATEROL 3.124; 2.736 UG/1; UG/1
SPRAY, METERED RESPIRATORY (INHALATION)
Qty: 1 INHALER | Refills: 3 | Status: SHIPPED | OUTPATIENT
Start: 2020-12-18

## 2020-12-22 ENCOUNTER — DOCUMENTATION (OUTPATIENT)
Dept: NUTRITION | Facility: HOSPITAL | Age: 70
End: 2020-12-22

## 2020-12-22 NOTE — PROGRESS NOTES
Oncology Nutrition Screening    Patient Name:  Prashanth Prajapati  YOB: 1950  MRN: [id number]  Date:  12/22/20  Physician:  Kiley    Type of Cancer Treatment:   Chemotherapy:  Type: Zepzelca  Treatment Schedule: every 3 weeks    Patient Active Problem List   Diagnosis   • Acquired hypothyroidism   • Centrilobular emphysema (CMS/HCC)   • Glucose intolerance   • Essential hypertension   • Mixed hyperlipidemia   • Lymphadenopathy, supraclavicular   • SCLC (small cell lung carcinoma) (CMS/HCC)   • Neuropathy associated with cancer (CMS/HCC)   • Cancer related pain   • Anxiety       Current Outpatient Medications   Medication Sig Dispense Refill   • acetaminophen (TYLENOL) 500 MG tablet Take 500 mg by mouth Every 6 (Six) Hours As Needed for Mild Pain .     • albuterol sulfate HFA (Ventolin HFA) 108 (90 Base) MCG/ACT inhaler Inhale 2 puffs Every 4 (Four) Hours As Needed for Wheezing or Shortness of Air. 18 g 5   • amLODIPine (NORVASC) 5 MG tablet Take 1 tablet by mouth Daily. 90 tablet 3   • baclofen (LIORESAL) 10 MG tablet START- ONE TAB NIGHTLY X7 DAYS, THEN 1 TAB 2X A DAY X7 DAYS, THEN 1 TAB 3X A  tablet 1   • Calcium Carbonate-Vitamin D (CALCIUM-CARB 600 + D) 600-125 MG-UNIT tablet Take 500 mg by mouth 2 (Two) Times a Day. 60 each 5   • diclofenac (VOLTAREN) 75 MG EC tablet Take 1 tablet by mouth Daily As Needed (pain). 90 tablet 3   • docusate sodium (COLACE) 100 MG capsule Take 1 tablet twice daily 60 capsule 3   • furosemide (LASIX) 20 MG tablet Take 1 tablet by mouth Daily As Needed (leg swelling) for up to 5 doses. 5 tablet 0   • gabapentin (NEURONTIN) 400 MG capsule Take 1 capsule by mouth Daily With Breakfast & Lunch. May also take 2 capsules At Night As Needed (neuropathy, pain). 270 capsule 1   • ipratropium-albuterol (DUO-NEB) 0.5-2.5 mg/3 ml nebulizer Take 3 mL by nebulization 4 (Four) Times a Day As Needed for Wheezing or Shortness of Air. 120 mL 5   • levothyroxine (SYNTHROID,  LEVOTHROID) 88 MCG tablet Take 1 tablet by mouth Daily. Indications: Underactive Thyroid 90 tablet 3   • lidocaine (LIDODERM) 5 % Place 1 patch on the skin as directed by provider Daily. Remove & Discard patch within 12 hours or as directed by MD 90 each 3   • Lidocaine Viscous HCl (XYLOCAINE) 2 % solution      • lidocaine-prilocaine (EMLA) 2.5-2.5 % cream Apply  topically to the appropriate area as directed As Needed (45-60 minutes prior to port access.  Cover with saran/plastic wrap.). 30 g 3   • LORazepam (Ativan) 1 MG tablet Take 1 tablet by mouth Every 8 (Eight) Hours As Needed for Anxiety. 90 tablet 2   • magic mouthwash oral suspension Swish and spit (or swallow) 1-2 Teaspoonfuls (5-10ml) 4 times a day as needed 240 mL 3   • methylPREDNISolone (MEDROL) 4 MG dose pack Take as directed on package instructions. 21 tablet 0   • Nebulizer device 1 Device Take As Directed. 1 each 0   • OLANZapine (zyPREXA) 5 MG tablet Take 1 tablet by mouth Every Night. 30 tablet 5   • omeprazole (priLOSEC) 40 MG capsule TAKE 1 CAPSULE BY MOUTH EVERY DAY 90 capsule 2   • ondansetron (ZOFRAN) 8 MG tablet Take 1 tablet by mouth 3 (Three) Times a Day As Needed for Nausea or Vomiting. 30 tablet 5   • ondansetron ODT (ZOFRAN-ODT) 8 MG disintegrating tablet PLACE 1 TABLET ON THE TONGUE EVERY 8 (EIGHT) HOURS AS NEEDED FOR NAUSEA OR VOMITING. 30 tablet 4   • pravastatin (PRAVACHOL) 40 MG tablet Take 1 tablet by mouth Daily. 90 tablet 3   • prochlorperazine (COMPAZINE) 10 MG tablet Take 1 tablet by mouth Every 6 (Six) Hours As Needed for Nausea or Vomiting. 60 tablet 5   • Stiolto Respimat 2.5-2.5 MCG/ACT aerosol solution inhaler INHALE 2 PUFFS BY MOUTH EVERY DAY 1 inhaler 3   • traMADol (ULTRAM) 50 MG tablet Take 1 tablet by mouth Every 8 (Eight) Hours As Needed for Severe Pain . 270 tablet 1     Current Facility-Administered Medications   Medication Dose Route Frequency Provider Last Rate Last Admin   • heparin injection 500 Units  500  "Units Intracatheter Q8H PRN Kathe Higuera, APRN   500 Units at 11/17/20 1116       Glycemic Risk:   Steriods    Weight:   Height: 62\"  Weight: 106 lbs.  Usual Body Weight: 120 lbs.   BMI: 19  Normal  Weight has decreased 15-20 pounds over last 6 months    Oral Food Intake:  Regular Diet - No Restrictions  Compared to normal intake, current food intake is more than normal  Drink 1 protein drink a day    Hydration Status:   How many 8 ounce glass of water of fluid do you drink per day?  4    Enteral Feeding:   N/A    Nutrition Symptoms:   Altered Apetite  Nausea    Activity:   Not my normal self, but able to be up and about with fairly normal activities     reports that she has been smoking cigarettes. She has a 12.50 pack-year smoking history. She has never used smokeless tobacco. She reports that she does not drink alcohol or use drugs.    Evaluation of Nutritional Risk:   Patient identified to be at nutritional risk and/or for nutritional consultation; will follow patient through course of treatment.   Weight Change  Nutrition Impact Symptoms    RD spoke with pt over the phone and reviewed Oncology Nutrition Screening, notes listed above. RD encouraged pt to request \"Healthy Eating during Chemotherapy\" recipe book by [employer] at next infusion. RD mailed handouts including \"Managing Your Cancer Treatment Side Effects\" from [employer], \"Nutrition During and After Cancer Treatment\" \"Oncology: Nutrition Tips for Well Being\", \"Oncology: Cooking Tips\", \"Nutrition Therapy for Nausea and Vomiting\" from [employer], and \"Taste or Smell Changes\" from [employer]. RD provided contact information and encouraged pt to reach out with any questions or concerns before a follow-up call in about three weeks. RD available PRN.    Electronically signed by:  Nini Mesa RD  11:41 EST  "

## 2021-01-05 NOTE — PROGRESS NOTES
DATE OF VISIT: 1/6/2021    REASON FOR VISIT: Followup for extensive stage small cell lung cancer     HISTORY OF PRESENT ILLNESS: The patient is a very pleasant 70 y.o. female with past medical history significant for extensive stage small cell lung cancer diagnosed December 2, 2019.  She was started on palliative treatment with carboplatin and etoposide and Tecentriq December 15, 2019. Tecentriq maintenance started 03/11/2020.  The patient had progressive disease and she was switched to Opdivo plus Yervoy.  She completed 4 cycles August 13, 2020.  This was followed by Opdivo single agent.  Repeat scans done December 7, 2020 revealed progressive disease.  She was started on third line treatment using Zepzelca December 2020.  The patient is here today for follow up visit.     SUBJECTIVE: Filippo is here today by herself.  She is feeling better since the new chemotherapy her nausea has improved as well as the swelling in her legs.    PAST MEDICAL HISTORY/SOCIAL HISTORY/FAMILY HISTORY: Reviewed by me and unchanged from my documentation done on 01/06/21.    Review of Systems   Constitutional: Positive for fatigue. Negative for activity change, appetite change, chills, fever and unexpected weight change.   HENT: Negative for hearing loss, mouth sores, nosebleeds, sore throat and trouble swallowing.    Eyes: Negative for visual disturbance.   Respiratory: Negative for cough, chest tightness, shortness of breath and wheezing.    Cardiovascular: Negative for chest pain and palpitations.   Gastrointestinal: Positive for constipation, nausea and vomiting. Negative for abdominal distention, abdominal pain, blood in stool, diarrhea and rectal pain.   Endocrine: Negative for cold intolerance and heat intolerance.   Genitourinary: Negative for difficulty urinating, dysuria, frequency and urgency.   Musculoskeletal: Negative for arthralgias, back pain, gait problem, joint swelling and myalgias.   Skin: Negative for rash.    Neurological: Negative for dizziness, tremors, syncope, weakness, light-headedness, numbness and headaches.   Hematological: Negative for adenopathy. Does not bruise/bleed easily.   Psychiatric/Behavioral: Negative for confusion, sleep disturbance and suicidal ideas. The patient is not nervous/anxious.          Current Outpatient Medications:   •  acetaminophen (TYLENOL) 500 MG tablet, Take 500 mg by mouth Every 6 (Six) Hours As Needed for Mild Pain ., Disp: , Rfl:   •  albuterol sulfate HFA (Ventolin HFA) 108 (90 Base) MCG/ACT inhaler, Inhale 2 puffs Every 4 (Four) Hours As Needed for Wheezing or Shortness of Air., Disp: 18 g, Rfl: 5  •  amLODIPine (NORVASC) 5 MG tablet, Take 1 tablet by mouth Daily., Disp: 90 tablet, Rfl: 3  •  baclofen (LIORESAL) 10 MG tablet, START- ONE TAB NIGHTLY X7 DAYS, THEN 1 TAB 2X A DAY X7 DAYS, THEN 1 TAB 3X A DAY, Disp: 270 tablet, Rfl: 1  •  Calcium Carbonate-Vitamin D (CALCIUM-CARB 600 + D) 600-125 MG-UNIT tablet, Take 500 mg by mouth 2 (Two) Times a Day., Disp: 60 each, Rfl: 5  •  diclofenac (VOLTAREN) 75 MG EC tablet, Take 1 tablet by mouth Daily As Needed (pain)., Disp: 90 tablet, Rfl: 3  •  docusate sodium (COLACE) 100 MG capsule, Take 1 tablet twice daily, Disp: 60 capsule, Rfl: 3  •  furosemide (LASIX) 20 MG tablet, Take 1 tablet by mouth Daily As Needed (leg swelling) for up to 5 doses., Disp: 5 tablet, Rfl: 0  •  gabapentin (NEURONTIN) 400 MG capsule, Take 1 capsule by mouth Daily With Breakfast & Lunch. May also take 2 capsules At Night As Needed (neuropathy, pain)., Disp: 270 capsule, Rfl: 1  •  ipratropium-albuterol (DUO-NEB) 0.5-2.5 mg/3 ml nebulizer, Take 3 mL by nebulization 4 (Four) Times a Day As Needed for Wheezing or Shortness of Air., Disp: 120 mL, Rfl: 5  •  levothyroxine (SYNTHROID, LEVOTHROID) 88 MCG tablet, Take 1 tablet by mouth Daily. Indications: Underactive Thyroid, Disp: 90 tablet, Rfl: 3  •  lidocaine (LIDODERM) 5 %, Place 1 patch on the skin as directed  by provider Daily. Remove & Discard patch within 12 hours or as directed by MD, Disp: 90 each, Rfl: 3  •  Lidocaine Viscous HCl (XYLOCAINE) 2 % solution, , Disp: , Rfl:   •  lidocaine-prilocaine (EMLA) 2.5-2.5 % cream, Apply  topically to the appropriate area as directed As Needed (45-60 minutes prior to port access.  Cover with saran/plastic wrap.)., Disp: 30 g, Rfl: 3  •  LORazepam (Ativan) 1 MG tablet, Take 1 tablet by mouth Every 8 (Eight) Hours As Needed for Anxiety., Disp: 90 tablet, Rfl: 2  •  magic mouthwash oral suspension, Swish and spit (or swallow) 1-2 Teaspoonfuls (5-10ml) 4 times a day as needed, Disp: 240 mL, Rfl: 3  •  methylPREDNISolone (MEDROL) 4 MG dose pack, Take as directed on package instructions., Disp: 21 tablet, Rfl: 0  •  Nebulizer device, 1 Device Take As Directed., Disp: 1 each, Rfl: 0  •  OLANZapine (zyPREXA) 5 MG tablet, Take 1 tablet by mouth Every Night., Disp: 30 tablet, Rfl: 5  •  omeprazole (priLOSEC) 40 MG capsule, TAKE 1 CAPSULE BY MOUTH EVERY DAY, Disp: 90 capsule, Rfl: 2  •  ondansetron (ZOFRAN) 8 MG tablet, Take 1 tablet by mouth 3 (Three) Times a Day As Needed for Nausea or Vomiting., Disp: 30 tablet, Rfl: 5  •  ondansetron ODT (ZOFRAN-ODT) 8 MG disintegrating tablet, PLACE 1 TABLET ON THE TONGUE EVERY 8 (EIGHT) HOURS AS NEEDED FOR NAUSEA OR VOMITING., Disp: 30 tablet, Rfl: 4  •  pravastatin (PRAVACHOL) 40 MG tablet, Take 1 tablet by mouth Daily., Disp: 90 tablet, Rfl: 3  •  prochlorperazine (COMPAZINE) 10 MG tablet, Take 1 tablet by mouth Every 6 (Six) Hours As Needed for Nausea or Vomiting., Disp: 60 tablet, Rfl: 5  •  Stiolto Respimat 2.5-2.5 MCG/ACT aerosol solution inhaler, INHALE 2 PUFFS BY MOUTH EVERY DAY, Disp: 1 inhaler, Rfl: 3  •  traMADol (ULTRAM) 50 MG tablet, Take 1 tablet by mouth Every 8 (Eight) Hours As Needed for Severe Pain ., Disp: 270 tablet, Rfl: 1    Current Facility-Administered Medications:   •  heparin injection 500 Units, 500 Units, Intracatheter,  "Q8H PRN, Kathe Higuera, APRN, 500 Units at 11/17/20 1116    PHYSICAL EXAMINATION:   /75   Pulse 93   Temp 97.3 °F (36.3 °C) (Temporal)   Resp 16   Ht 157.5 cm (62\")   Wt 46.3 kg (102 lb)   SpO2 98%   BMI 18.66 kg/m²    ECOG Performance Status: 1 - Symptomatic but completely ambulatory  General Appearance:  alert, cooperative, no apparent distress, appears stated age and normal weight   Neurologic/Psychiatric: A&O x 3, gait steady, appropriate affect, strength 5/5 in all muscle groups   HEENT:  Normocephalic, without obvious abnormality, mucous membranes moist   Neck: Supple, symmetrical, trachea midline, no adenopathy;  No thyromegaly, masses, or tenderness   Lungs:   Clear to auscultation bilaterally; respirations regular, even, and unlabored bilaterally   Heart:  Regular rate and rhythm, no murmurs appreciated   Abdomen:   Soft, non-tender, non-distended and no organomegaly   Lymph nodes: No cervical, supraclavicular, inguinal or axillary adenopathy noted.  Submandibular nodule noted approximately 1cm   Extremities: Normal, atraumatic; no clubbing, cyanosis, or edema    Skin: No rashes, ulcers, or suspicious lesions noted     No visits with results within 2 Week(s) from this visit.   Latest known visit with results is:   Infusion on 12/17/2020   Component Date Value Ref Range Status   • Glucose 12/17/2020 70  65 - 99 mg/dL Final   • BUN 12/17/2020 15  8 - 23 mg/dL Final   • Creatinine 12/17/2020 0.63  0.57 - 1.00 mg/dL Final   • Sodium 12/17/2020 133* 136 - 145 mmol/L Final   • Potassium 12/17/2020 4.2  3.5 - 5.2 mmol/L Final   • Chloride 12/17/2020 100  98 - 107 mmol/L Final   • CO2 12/17/2020 28.1  22.0 - 29.0 mmol/L Final   • Calcium 12/17/2020 7.7* 8.6 - 10.5 mg/dL Final   • Total Protein 12/17/2020 4.9* 6.0 - 8.5 g/dL Final   • Albumin 12/17/2020 2.00* 3.50 - 5.20 g/dL Final   • ALT (SGPT) 12/17/2020 10  1 - 33 U/L Final   • AST (SGOT) 12/17/2020 11  1 - 32 U/L Final   • Alkaline Phosphatase " 12/17/2020 143* 39 - 117 U/L Final   • Total Bilirubin 12/17/2020 0.3  0.0 - 1.2 mg/dL Final   • eGFR Non African Amer 12/17/2020 94  >60 mL/min/1.73 Final   • Globulin 12/17/2020 2.9  gm/dL Final   • A/G Ratio 12/17/2020 0.7  g/dL Final   • BUN/Creatinine Ratio 12/17/2020 23.8  7.0 - 25.0 Final   • Anion Gap 12/17/2020 4.9* 5.0 - 15.0 mmol/L Final   • Magnesium 12/17/2020 1.8  1.6 - 2.4 mg/dL Final   • WBC 12/17/2020 9.97  3.40 - 10.80 10*3/mm3 Final   • RBC 12/17/2020 3.35* 3.77 - 5.28 10*6/mm3 Final   • Hemoglobin 12/17/2020 10.4* 12.0 - 15.9 g/dL Final   • Hematocrit 12/17/2020 32.3* 34.0 - 46.6 % Final   • MCV 12/17/2020 96.4  79.0 - 97.0 fL Final   • MCH 12/17/2020 31.0  26.6 - 33.0 pg Final   • MCHC 12/17/2020 32.2  31.5 - 35.7 g/dL Final   • RDW 12/17/2020 19.0* 12.3 - 15.4 % Final   • RDW-SD 12/17/2020 67.2* 37.0 - 54.0 fl Final   • MPV 12/17/2020 8.5  6.0 - 12.0 fL Final   • Platelets 12/17/2020 295  140 - 450 10*3/mm3 Final   • Neutrophil % 12/17/2020 70.8  42.7 - 76.0 % Final   • Lymphocyte % 12/17/2020 18.6* 19.6 - 45.3 % Final   • Monocyte % 12/17/2020 9.5  5.0 - 12.0 % Final   • Eosinophil % 12/17/2020 0.0* 0.3 - 6.2 % Final   • Basophil % 12/17/2020 0.4  0.0 - 1.5 % Final   • Immature Grans % 12/17/2020 0.7* 0.0 - 0.5 % Final   • Neutrophils, Absolute 12/17/2020 7.06* 1.70 - 7.00 10*3/mm3 Final   • Lymphocytes, Absolute 12/17/2020 1.85  0.70 - 3.10 10*3/mm3 Final   • Monocytes, Absolute 12/17/2020 0.95* 0.10 - 0.90 10*3/mm3 Final   • Eosinophils, Absolute 12/17/2020 0.00  0.00 - 0.40 10*3/mm3 Final   • Basophils, Absolute 12/17/2020 0.04  0.00 - 0.20 10*3/mm3 Final   • Immature Grans, Absolute 12/17/2020 0.07* 0.00 - 0.05 10*3/mm3 Final   • nRBC 12/17/2020 0.0  0.0 - 0.2 /100 WBC Final      No results found.    ASSESSMENT: The patient is a very pleasant 70 y.o. female  with right upper lobe extensive stage small cell lung cancer     PROBLEM LIST:   1.  Right upper lobe extensive stage small cell lung  cancer, [id number] stage IVb:  A.  Presented with right lower ribs pain  B.  CT chest revealed 3.5 cm right upper lobe lung mass, right adrenal nodule, mediastinal adenopathy, and left cervical lymphadenopathy.  C.  Status post left cervical lymph node biopsy done by Dr. Owen December 2, 2019  D.  Pathology consistent with small cell carcinoma  E.  Started palliative treatment with carboplatin and etoposide and Tecentriq December 15, 2019, status post 8 cycles  F.  Progressive disease documented CT scans done on June 1, 2020 with a new cervical lymphadenopathy and relapsed right adrenal mass  G.  Started Opdivo plus Yervoy Brenda 10, 2020, status post 4 cycle followed by Opdivo single agent with unfortunately progressive disease documented on CT scans done December 7, 2020.  H.  Zepzelca with 25% dose reduction started December 16, 2020, and is post 1 cycle  2.  COPD  3.  Hypertension  4.  Hypercholesterolemia  5.  Hypothyroid  6.  GERD  7.  Progressive nausea and vomiting  8. Anxiety  9. Cancer related pain  10. Treatment related neuropathy    PLAN:  1. We will proceed with Zepzelca once every 3 weeks with 25% dose reduction given her borderline performance status and previous chemotherapy and immunotherapy exposure cycle #2.  2. She will continue to keep her feet elevated and compression stockings for bilateral lower extremity edema.   3. I will continue to monitor the patient blood work including blood counts kidney function liver function electrolytes function.  4.  She will follow-up in 3 weeks for cycle #3.  5.  I will repeat the patient scans after cycle #3.  6. We will continue omeprazole for GERD.   7.  I will continue patient on Zofran as needed for chemotherapy-induced nausea as well as phenergan.  8. We reviewed potential side effects of treatment including alopecia, nausea vomiting, pancytopenia, neutropenic fever, and potential death.  9. We will continue ativan for anxiety. I will add a dose today prior  to treatment.   10. We will continue ultram as needed for cancer related pain. She will contact office if pain worsens or is not controlled.   11. We will continue gabapentin for treatment related neuropathy.       Rupesh Cao MD  1/6/2021

## 2021-01-06 ENCOUNTER — INFUSION (OUTPATIENT)
Dept: ONCOLOGY | Facility: HOSPITAL | Age: 71
End: 2021-01-06

## 2021-01-06 ENCOUNTER — OFFICE VISIT (OUTPATIENT)
Dept: ONCOLOGY | Facility: CLINIC | Age: 71
End: 2021-01-06

## 2021-01-06 VITALS
BODY MASS INDEX: 18.77 KG/M2 | OXYGEN SATURATION: 98 % | HEIGHT: 62 IN | WEIGHT: 102 LBS | SYSTOLIC BLOOD PRESSURE: 137 MMHG | RESPIRATION RATE: 16 BRPM | HEART RATE: 93 BPM | TEMPERATURE: 97.3 F | DIASTOLIC BLOOD PRESSURE: 75 MMHG

## 2021-01-06 DIAGNOSIS — C34.90 SCLC (SMALL CELL LUNG CARCINOMA) (HCC): Primary | ICD-10-CM

## 2021-01-06 LAB
ALBUMIN SERPL-MCNC: 2.2 G/DL (ref 3.5–5.2)
ALBUMIN/GLOB SERPL: 0.7 G/DL
ALP SERPL-CCNC: 116 U/L (ref 39–117)
ALT SERPL W P-5'-P-CCNC: 7 U/L (ref 1–33)
ANION GAP SERPL CALCULATED.3IONS-SCNC: 6.8 MMOL/L (ref 5–15)
AST SERPL-CCNC: 12 U/L (ref 1–32)
BASOPHILS # BLD AUTO: 0.04 10*3/MM3 (ref 0–0.2)
BASOPHILS NFR BLD AUTO: 0.5 % (ref 0–1.5)
BILIRUB SERPL-MCNC: 0.2 MG/DL (ref 0–1.2)
BUN SERPL-MCNC: 15 MG/DL (ref 8–23)
BUN/CREAT SERPL: 27.8 (ref 7–25)
CALCIUM SPEC-SCNC: 7.6 MG/DL (ref 8.6–10.5)
CHLORIDE SERPL-SCNC: 97 MMOL/L (ref 98–107)
CO2 SERPL-SCNC: 25.2 MMOL/L (ref 22–29)
CREAT SERPL-MCNC: 0.54 MG/DL (ref 0.57–1)
DEPRECATED RDW RBC AUTO: 65 FL (ref 37–54)
EOSINOPHIL # BLD AUTO: 0 10*3/MM3 (ref 0–0.4)
EOSINOPHIL NFR BLD AUTO: 0 % (ref 0.3–6.2)
ERYTHROCYTE [DISTWIDTH] IN BLOOD BY AUTOMATED COUNT: 17.3 % (ref 12.3–15.4)
GFR SERPL CREATININE-BSD FRML MDRD: 112 ML/MIN/1.73
GLOBULIN UR ELPH-MCNC: 3 GM/DL
GLUCOSE SERPL-MCNC: 83 MG/DL (ref 65–99)
HCT VFR BLD AUTO: 29.4 % (ref 34–46.6)
HGB BLD-MCNC: 9.7 G/DL (ref 12–15.9)
IMM GRANULOCYTES # BLD AUTO: 0.05 10*3/MM3 (ref 0–0.05)
IMM GRANULOCYTES NFR BLD AUTO: 0.6 % (ref 0–0.5)
LYMPHOCYTES # BLD AUTO: 2.24 10*3/MM3 (ref 0.7–3.1)
LYMPHOCYTES NFR BLD AUTO: 26 % (ref 19.6–45.3)
MAGNESIUM SERPL-MCNC: 1.7 MG/DL (ref 1.6–2.4)
MCH RBC QN AUTO: 33.7 PG (ref 26.6–33)
MCHC RBC AUTO-ENTMCNC: 33 G/DL (ref 31.5–35.7)
MCV RBC AUTO: 102.1 FL (ref 79–97)
MONOCYTES # BLD AUTO: 0.98 10*3/MM3 (ref 0.1–0.9)
MONOCYTES NFR BLD AUTO: 11.4 % (ref 5–12)
NEUTROPHILS NFR BLD AUTO: 5.3 10*3/MM3 (ref 1.7–7)
NEUTROPHILS NFR BLD AUTO: 61.5 % (ref 42.7–76)
NRBC BLD AUTO-RTO: 0 /100 WBC (ref 0–0.2)
PLATELET # BLD AUTO: 293 10*3/MM3 (ref 140–450)
PMV BLD AUTO: 8.8 FL (ref 6–12)
POTASSIUM SERPL-SCNC: 4.2 MMOL/L (ref 3.5–5.2)
PROT SERPL-MCNC: 5.2 G/DL (ref 6–8.5)
RBC # BLD AUTO: 2.88 10*6/MM3 (ref 3.77–5.28)
SODIUM SERPL-SCNC: 129 MMOL/L (ref 136–145)
WBC # BLD AUTO: 8.61 10*3/MM3 (ref 3.4–10.8)

## 2021-01-06 PROCEDURE — 80053 COMPREHEN METABOLIC PANEL: CPT

## 2021-01-06 PROCEDURE — 99215 OFFICE O/P EST HI 40 MIN: CPT | Performed by: INTERNAL MEDICINE

## 2021-01-06 PROCEDURE — 25010000002 DEXAMETHASONE PER 1 MG: Performed by: INTERNAL MEDICINE

## 2021-01-06 PROCEDURE — 96375 TX/PRO/DX INJ NEW DRUG ADDON: CPT

## 2021-01-06 PROCEDURE — 36415 COLL VENOUS BLD VENIPUNCTURE: CPT

## 2021-01-06 PROCEDURE — 83735 ASSAY OF MAGNESIUM: CPT

## 2021-01-06 PROCEDURE — 85025 COMPLETE CBC W/AUTO DIFF WBC: CPT

## 2021-01-06 PROCEDURE — 25010000002 LURBINECTEDIN 4 MG RECONSTITUTED SOLUTION 1 EACH VIAL: Performed by: INTERNAL MEDICINE

## 2021-01-06 PROCEDURE — 25010000003 HEPARIN LOCK FLUSH PER 10 UNITS: Performed by: INTERNAL MEDICINE

## 2021-01-06 PROCEDURE — 96367 TX/PROPH/DG ADDL SEQ IV INF: CPT

## 2021-01-06 PROCEDURE — 25010000002 PALONOSETRON 0.25 MG/5ML SOLUTION PREFILLED SYRINGE: Performed by: INTERNAL MEDICINE

## 2021-01-06 PROCEDURE — 96413 CHEMO IV INFUSION 1 HR: CPT

## 2021-01-06 RX ORDER — SODIUM CHLORIDE 0.9 % (FLUSH) 0.9 %
10 SYRINGE (ML) INJECTION AS NEEDED
Status: DISCONTINUED | OUTPATIENT
Start: 2021-01-06 | End: 2021-01-06 | Stop reason: HOSPADM

## 2021-01-06 RX ORDER — PALONOSETRON 0.05 MG/ML
0.25 INJECTION, SOLUTION INTRAVENOUS ONCE
Status: CANCELLED | OUTPATIENT
Start: 2021-01-27

## 2021-01-06 RX ORDER — SODIUM CHLORIDE 9 MG/ML
250 INJECTION, SOLUTION INTRAVENOUS ONCE
Status: CANCELLED | OUTPATIENT
Start: 2021-01-27

## 2021-01-06 RX ORDER — PALONOSETRON 0.05 MG/ML
0.25 INJECTION, SOLUTION INTRAVENOUS ONCE
Status: COMPLETED | OUTPATIENT
Start: 2021-01-06 | End: 2021-01-06

## 2021-01-06 RX ORDER — HEPARIN SODIUM (PORCINE) LOCK FLUSH IV SOLN 100 UNIT/ML 100 UNIT/ML
500 SOLUTION INTRAVENOUS AS NEEDED
Status: DISCONTINUED | OUTPATIENT
Start: 2021-01-06 | End: 2021-01-06 | Stop reason: HOSPADM

## 2021-01-06 RX ORDER — SODIUM CHLORIDE 0.9 % (FLUSH) 0.9 %
10 SYRINGE (ML) INJECTION AS NEEDED
Status: CANCELLED | OUTPATIENT
Start: 2021-01-06

## 2021-01-06 RX ORDER — SODIUM CHLORIDE 9 MG/ML
250 INJECTION, SOLUTION INTRAVENOUS ONCE
Status: DISCONTINUED | OUTPATIENT
Start: 2021-01-06 | End: 2021-01-06 | Stop reason: HOSPADM

## 2021-01-06 RX ORDER — HEPARIN SODIUM (PORCINE) LOCK FLUSH IV SOLN 100 UNIT/ML 100 UNIT/ML
500 SOLUTION INTRAVENOUS AS NEEDED
Status: CANCELLED | OUTPATIENT
Start: 2021-01-06

## 2021-01-06 RX ADMIN — LURBINECTEDIN 3.5 MG: 0.5 INJECTION, POWDER, LYOPHILIZED, FOR SOLUTION INTRAVENOUS at 15:07

## 2021-01-06 RX ADMIN — HEPARIN 500 UNITS: 100 SYRINGE at 16:06

## 2021-01-06 RX ADMIN — DEXAMETHASONE SODIUM PHOSPHATE 12 MG: 4 INJECTION, SOLUTION INTRAMUSCULAR; INTRAVENOUS at 14:48

## 2021-01-06 RX ADMIN — SODIUM CHLORIDE, PRESERVATIVE FREE 10 ML: 5 INJECTION INTRAVENOUS at 16:05

## 2021-01-06 RX ADMIN — PALONOSETRON 0.25 MG: 0.25 INJECTION, SOLUTION INTRAVENOUS at 14:44

## 2021-01-06 NOTE — PATIENT INSTRUCTIONS
Hyponatremia  Hyponatremia is when the amount of salt (sodium) in your blood is too low. When salt levels are low, your body may take in extra water. This can cause swelling throughout the body. The swelling often affects the brain.  What are the causes?  This condition may be caused by:  · Certain medical problems or conditions.  · Vomiting a lot.  · Having watery poop (diarrhea) often.  · Certain medicines or illegal drugs.  · Not having enough water in the body (dehydration).  · Drinking too much water.  · Eating a diet that is low in salt.  · Large burns on your body.  · Too much sweating.  What increases the risk?  You are more likely to get this condition if you:  · Have long-term (chronic) kidney disease.  · Have heart failure.  · Have a medical condition that causes you to have watery poop often.  · Do very hard exercises.  · Take medicines that affect the amount of salt is in your blood.  What are the signs or symptoms?  Symptoms of this condition include:  · Headache.  · Feeling like you may vomit (nausea).  · Vomiting.  · Being very tired (lethargic).  · Muscle weakness and cramps.  · Not wanting to eat as much as normal (loss of appetite).  · Feeling weak or light-headed.  Severe symptoms of this condition include:  · Confusion.  · Feeling restless (agitation).  · Having a fast heart rate.  · Passing out (fainting).  · Seizures.  · Coma.  How is this treated?  Treatment for this condition depends on the cause. Treatment may include:  · Getting fluids through an IV tube that is put into one of your veins.  · Taking medicines to fix the salt levels in your blood. If medicines are causing the problem, your medicines will need to be changed.  · Limiting how much water or fluid you take in.  · Monitoring in the hospital to watch your symptoms.  Follow these instructions at home:    · Take over-the-counter and prescription medicines only as told by your doctor. Many medicines can make this condition worse.  Talk with your doctor about any medicines that you are taking.  · Eat and drink exactly as you are told by your doctor.  ? Eat only the foods you are told to eat.  ? Limit how much fluid you take.  · Do not drink alcohol.  · Keep all follow-up visits as told by your doctor. This is important.  Contact a doctor if:  · You feel more like you may vomit.  · You feel more tired.  · Your headache gets worse.  · You feel more confused.  · You feel weaker.  · Your symptoms go away and then they come back.  · You have trouble following the diet instructions.  Get help right away if:  · You have a seizure.  · You pass out.  · You keep having watery poop.  · You keep vomiting.  Summary  · Hyponatremia is when the amount of salt in your blood is too low.  · When salt levels are low, you can have swelling throughout the body. The swelling mostly affects the brain.  · Treatment depends on the cause. Treatment may include getting IV fluids, medicines, or not drinking as much fluid.  This information is not intended to replace advice given to you by your health care provider. Make sure you discuss any questions you have with your health care provider.  Document Revised: 03/05/2020 Document Reviewed: 11/21/2019  [employer] Patient Education © 2020 [employer]

## 2021-01-27 ENCOUNTER — OFFICE VISIT (OUTPATIENT)
Dept: ONCOLOGY | Facility: CLINIC | Age: 71
End: 2021-01-27

## 2021-01-27 ENCOUNTER — INFUSION (OUTPATIENT)
Dept: ONCOLOGY | Facility: HOSPITAL | Age: 71
End: 2021-01-27

## 2021-01-27 VITALS
SYSTOLIC BLOOD PRESSURE: 111 MMHG | DIASTOLIC BLOOD PRESSURE: 63 MMHG | HEIGHT: 62 IN | BODY MASS INDEX: 17.11 KG/M2 | TEMPERATURE: 97.7 F | RESPIRATION RATE: 16 BRPM | HEART RATE: 115 BPM | OXYGEN SATURATION: 97 % | WEIGHT: 93 LBS

## 2021-01-27 DIAGNOSIS — C34.90 SCLC (SMALL CELL LUNG CARCINOMA) (HCC): Primary | ICD-10-CM

## 2021-01-27 DIAGNOSIS — G63 NEUROPATHY ASSOCIATED WITH CANCER (HCC): ICD-10-CM

## 2021-01-27 DIAGNOSIS — C80.1 NEUROPATHY ASSOCIATED WITH CANCER (HCC): ICD-10-CM

## 2021-01-27 LAB
ALBUMIN SERPL-MCNC: 2.1 G/DL (ref 3.5–5.2)
ALBUMIN/GLOB SERPL: 0.7 G/DL
ALP SERPL-CCNC: 132 U/L (ref 39–117)
ALT SERPL W P-5'-P-CCNC: 7 U/L (ref 1–33)
ANION GAP SERPL CALCULATED.3IONS-SCNC: 6.4 MMOL/L (ref 5–15)
AST SERPL-CCNC: 16 U/L (ref 1–32)
BASOPHILS # BLD AUTO: 0.04 10*3/MM3 (ref 0–0.2)
BASOPHILS NFR BLD AUTO: 0.4 % (ref 0–1.5)
BILIRUB SERPL-MCNC: 0.3 MG/DL (ref 0–1.2)
BUN SERPL-MCNC: 17 MG/DL (ref 8–23)
BUN/CREAT SERPL: 31.5 (ref 7–25)
CALCIUM SPEC-SCNC: 7.6 MG/DL (ref 8.6–10.5)
CHLORIDE SERPL-SCNC: 96 MMOL/L (ref 98–107)
CO2 SERPL-SCNC: 25.6 MMOL/L (ref 22–29)
CREAT SERPL-MCNC: 0.54 MG/DL (ref 0.57–1)
DEPRECATED RDW RBC AUTO: 54.4 FL (ref 37–54)
EOSINOPHIL # BLD AUTO: 0 10*3/MM3 (ref 0–0.4)
EOSINOPHIL NFR BLD AUTO: 0 % (ref 0.3–6.2)
ERYTHROCYTE [DISTWIDTH] IN BLOOD BY AUTOMATED COUNT: 14.1 % (ref 12.3–15.4)
GFR SERPL CREATININE-BSD FRML MDRD: 112 ML/MIN/1.73
GLOBULIN UR ELPH-MCNC: 3 GM/DL
GLUCOSE SERPL-MCNC: 76 MG/DL (ref 65–99)
HCT VFR BLD AUTO: 27.7 % (ref 34–46.6)
HGB BLD-MCNC: 8.9 G/DL (ref 12–15.9)
IMM GRANULOCYTES # BLD AUTO: 0.06 10*3/MM3 (ref 0–0.05)
IMM GRANULOCYTES NFR BLD AUTO: 0.6 % (ref 0–0.5)
LYMPHOCYTES # BLD AUTO: 1.72 10*3/MM3 (ref 0.7–3.1)
LYMPHOCYTES NFR BLD AUTO: 17 % (ref 19.6–45.3)
MAGNESIUM SERPL-MCNC: 1.7 MG/DL (ref 1.6–2.4)
MCH RBC QN AUTO: 33.5 PG (ref 26.6–33)
MCHC RBC AUTO-ENTMCNC: 32.1 G/DL (ref 31.5–35.7)
MCV RBC AUTO: 104.1 FL (ref 79–97)
MONOCYTES # BLD AUTO: 1.52 10*3/MM3 (ref 0.1–0.9)
MONOCYTES NFR BLD AUTO: 15 % (ref 5–12)
NEUTROPHILS NFR BLD AUTO: 6.78 10*3/MM3 (ref 1.7–7)
NEUTROPHILS NFR BLD AUTO: 67 % (ref 42.7–76)
NRBC BLD AUTO-RTO: 0 /100 WBC (ref 0–0.2)
PLATELET # BLD AUTO: 303 10*3/MM3 (ref 140–450)
PMV BLD AUTO: 8.5 FL (ref 6–12)
POTASSIUM SERPL-SCNC: 4.1 MMOL/L (ref 3.5–5.2)
PROT SERPL-MCNC: 5.1 G/DL (ref 6–8.5)
RBC # BLD AUTO: 2.66 10*6/MM3 (ref 3.77–5.28)
SODIUM SERPL-SCNC: 128 MMOL/L (ref 136–145)
WBC # BLD AUTO: 10.12 10*3/MM3 (ref 3.4–10.8)

## 2021-01-27 PROCEDURE — 99215 OFFICE O/P EST HI 40 MIN: CPT | Performed by: NURSE PRACTITIONER

## 2021-01-27 PROCEDURE — 36415 COLL VENOUS BLD VENIPUNCTURE: CPT

## 2021-01-27 PROCEDURE — 96413 CHEMO IV INFUSION 1 HR: CPT

## 2021-01-27 PROCEDURE — 85025 COMPLETE CBC W/AUTO DIFF WBC: CPT

## 2021-01-27 PROCEDURE — 96375 TX/PRO/DX INJ NEW DRUG ADDON: CPT

## 2021-01-27 PROCEDURE — 80053 COMPREHEN METABOLIC PANEL: CPT

## 2021-01-27 PROCEDURE — 25010000002 PALONOSETRON 0.25 MG/5ML SOLUTION PREFILLED SYRINGE: Performed by: INTERNAL MEDICINE

## 2021-01-27 PROCEDURE — 25010000002 LURBINECTEDIN 4 MG RECONSTITUTED SOLUTION 1 EACH VIAL: Performed by: INTERNAL MEDICINE

## 2021-01-27 PROCEDURE — 25010000003 HEPARIN LOCK FLUSH PER 10 UNITS: Performed by: NURSE PRACTITIONER

## 2021-01-27 PROCEDURE — 25010000002 DEXAMETHASONE PER 1 MG: Performed by: INTERNAL MEDICINE

## 2021-01-27 PROCEDURE — 25010000003 HEPARIN LOCK FLUSH PER 10 UNITS: Performed by: INTERNAL MEDICINE

## 2021-01-27 PROCEDURE — 83735 ASSAY OF MAGNESIUM: CPT

## 2021-01-27 RX ORDER — HEPARIN SODIUM (PORCINE) LOCK FLUSH IV SOLN 100 UNIT/ML 100 UNIT/ML
500 SOLUTION INTRAVENOUS AS NEEDED
Status: DISCONTINUED | OUTPATIENT
Start: 2021-01-27 | End: 2021-01-27 | Stop reason: HOSPADM

## 2021-01-27 RX ORDER — SODIUM CHLORIDE 9 MG/ML
250 INJECTION, SOLUTION INTRAVENOUS ONCE
Status: DISCONTINUED | OUTPATIENT
Start: 2021-01-27 | End: 2021-01-27 | Stop reason: HOSPADM

## 2021-01-27 RX ORDER — SODIUM CHLORIDE 9 MG/ML
250 INJECTION, SOLUTION INTRAVENOUS ONCE
Status: CANCELLED | OUTPATIENT
Start: 2021-02-17

## 2021-01-27 RX ORDER — PREGABALIN 50 MG/1
50 CAPSULE ORAL 2 TIMES DAILY
Qty: 60 CAPSULE | Refills: 3 | Status: SHIPPED | OUTPATIENT
Start: 2021-01-27 | End: 2021-02-26

## 2021-01-27 RX ORDER — PALONOSETRON 0.05 MG/ML
0.25 INJECTION, SOLUTION INTRAVENOUS ONCE
Status: CANCELLED | OUTPATIENT
Start: 2021-02-17

## 2021-01-27 RX ORDER — SODIUM CHLORIDE 0.9 % (FLUSH) 0.9 %
10 SYRINGE (ML) INJECTION AS NEEDED
Status: DISCONTINUED | OUTPATIENT
Start: 2021-01-27 | End: 2021-01-27 | Stop reason: HOSPADM

## 2021-01-27 RX ORDER — PALONOSETRON 0.05 MG/ML
0.25 INJECTION, SOLUTION INTRAVENOUS ONCE
Status: COMPLETED | OUTPATIENT
Start: 2021-01-27 | End: 2021-01-27

## 2021-01-27 RX ADMIN — DEXAMETHASONE SODIUM PHOSPHATE 12 MG: 4 INJECTION, SOLUTION INTRAMUSCULAR; INTRAVENOUS at 14:19

## 2021-01-27 RX ADMIN — SODIUM CHLORIDE, PRESERVATIVE FREE 500 UNITS: 5 INJECTION INTRAVENOUS at 15:41

## 2021-01-27 RX ADMIN — SODIUM CHLORIDE, PRESERVATIVE FREE 10 ML: 5 INJECTION INTRAVENOUS at 15:41

## 2021-01-27 RX ADMIN — PALONOSETRON 0.25 MG: 0.25 INJECTION, SOLUTION INTRAVENOUS at 14:19

## 2021-01-27 RX ADMIN — LURBINECTEDIN 3.5 MG: 0.5 INJECTION, POWDER, LYOPHILIZED, FOR SOLUTION INTRAVENOUS at 14:40

## 2021-01-27 RX ADMIN — HEPARIN 500 UNITS: 100 SYRINGE at 15:41

## 2021-01-27 NOTE — PROGRESS NOTES
DATE OF VISIT: 1/27/2021    REASON FOR VISIT: Followup for extensive stage small cell lung cancer     HISTORY OF PRESENT ILLNESS: The patient is a very pleasant 70 y.o. female with past medical history significant for extensive stage small cell lung cancer diagnosed December 2, 2019.  She was started on palliative treatment with carboplatin and etoposide and Tecentriq December 15, 2019. Tecentriq maintenance started 03/11/2020.  The patient had progressive disease and she was switched to Opdivo plus Yervoy.  She completed 4 cycles August 13, 2020.  This was followed by Opdivo single agent.  Repeat scans done December 7, 2020 revealed progressive disease.  She was started on third line treatment using Zepzelca December 2020.  The patient is here today for follow up visit.     SUBJECTIVE: Filippo is here today by herself.  She had some nausea this past cycle.  She is using Zofran.  She did throw up twice.  She did not try her promethazine.  She has lost about 10 pounds since the last time we seen her.  She stopped her protein shakes due to running out of them.  She has since restarted.  She complains about peripheral neuropathy that has seemed to worsen in the past couple weeks the gabapentin is not really working.      PAST MEDICAL HISTORY/SOCIAL HISTORY/FAMILY HISTORY: Reviewed by me and unchanged from my documentation done on 01/27/21.    Review of Systems   Constitutional: Positive for fatigue. Negative for activity change, appetite change, chills, fever and unexpected weight change.   HENT: Negative for hearing loss, mouth sores, nosebleeds, sore throat and trouble swallowing.    Eyes: Negative for visual disturbance.   Respiratory: Negative for cough, chest tightness, shortness of breath and wheezing.    Cardiovascular: Negative for chest pain and palpitations.   Gastrointestinal: Positive for constipation, nausea and vomiting. Negative for abdominal distention, abdominal pain, blood in stool, diarrhea and rectal  pain.   Endocrine: Negative for cold intolerance and heat intolerance.   Genitourinary: Negative for difficulty urinating, dysuria, frequency and urgency.   Musculoskeletal: Negative for arthralgias, back pain, gait problem, joint swelling and myalgias.   Skin: Negative for rash.   Neurological: Negative for dizziness, tremors, syncope, weakness, light-headedness, numbness and headaches.   Hematological: Negative for adenopathy. Does not bruise/bleed easily.   Psychiatric/Behavioral: Negative for confusion, sleep disturbance and suicidal ideas. The patient is not nervous/anxious.          Current Outpatient Medications:   •  acetaminophen (TYLENOL) 500 MG tablet, Take 500 mg by mouth Every 6 (Six) Hours As Needed for Mild Pain ., Disp: , Rfl:   •  albuterol sulfate HFA (Ventolin HFA) 108 (90 Base) MCG/ACT inhaler, Inhale 2 puffs Every 4 (Four) Hours As Needed for Wheezing or Shortness of Air., Disp: 18 g, Rfl: 5  •  amLODIPine (NORVASC) 5 MG tablet, Take 1 tablet by mouth Daily., Disp: 90 tablet, Rfl: 3  •  baclofen (LIORESAL) 10 MG tablet, START- ONE TAB NIGHTLY X7 DAYS, THEN 1 TAB 2X A DAY X7 DAYS, THEN 1 TAB 3X A DAY, Disp: 270 tablet, Rfl: 1  •  Calcium Carbonate-Vitamin D (CALCIUM-CARB 600 + D) 600-125 MG-UNIT tablet, Take 500 mg by mouth 2 (Two) Times a Day., Disp: 60 each, Rfl: 5  •  diclofenac (VOLTAREN) 75 MG EC tablet, Take 1 tablet by mouth Daily As Needed (pain)., Disp: 90 tablet, Rfl: 3  •  docusate sodium (COLACE) 100 MG capsule, Take 1 tablet twice daily, Disp: 60 capsule, Rfl: 3  •  furosemide (LASIX) 20 MG tablet, Take 1 tablet by mouth Daily As Needed (leg swelling) for up to 5 doses., Disp: 5 tablet, Rfl: 0  •  gabapentin (NEURONTIN) 400 MG capsule, Take 1 capsule by mouth Daily With Breakfast & Lunch. May also take 2 capsules At Night As Needed (neuropathy, pain)., Disp: 270 capsule, Rfl: 1  •  ipratropium-albuterol (DUO-NEB) 0.5-2.5 mg/3 ml nebulizer, Take 3 mL by nebulization 4 (Four) Times a  Day As Needed for Wheezing or Shortness of Air., Disp: 120 mL, Rfl: 5  •  levothyroxine (SYNTHROID, LEVOTHROID) 88 MCG tablet, Take 1 tablet by mouth Daily. Indications: Underactive Thyroid, Disp: 90 tablet, Rfl: 3  •  lidocaine (LIDODERM) 5 %, Place 1 patch on the skin as directed by provider Daily. Remove & Discard patch within 12 hours or as directed by MD, Disp: 90 each, Rfl: 3  •  Lidocaine Viscous HCl (XYLOCAINE) 2 % solution, , Disp: , Rfl:   •  lidocaine-prilocaine (EMLA) 2.5-2.5 % cream, Apply  topically to the appropriate area as directed As Needed (45-60 minutes prior to port access.  Cover with saran/plastic wrap.)., Disp: 30 g, Rfl: 3  •  LORazepam (Ativan) 1 MG tablet, Take 1 tablet by mouth Every 8 (Eight) Hours As Needed for Anxiety., Disp: 90 tablet, Rfl: 2  •  magic mouthwash oral suspension, Swish and spit (or swallow) 1-2 Teaspoonfuls (5-10ml) 4 times a day as needed, Disp: 240 mL, Rfl: 3  •  methylPREDNISolone (MEDROL) 4 MG dose pack, Take as directed on package instructions., Disp: 21 tablet, Rfl: 0  •  Nebulizer device, 1 Device Take As Directed., Disp: 1 each, Rfl: 0  •  OLANZapine (zyPREXA) 5 MG tablet, Take 1 tablet by mouth Every Night., Disp: 30 tablet, Rfl: 5  •  omeprazole (priLOSEC) 40 MG capsule, TAKE 1 CAPSULE BY MOUTH EVERY DAY, Disp: 90 capsule, Rfl: 2  •  ondansetron (ZOFRAN) 8 MG tablet, Take 1 tablet by mouth 3 (Three) Times a Day As Needed for Nausea or Vomiting., Disp: 30 tablet, Rfl: 5  •  ondansetron ODT (ZOFRAN-ODT) 8 MG disintegrating tablet, PLACE 1 TABLET ON THE TONGUE EVERY 8 (EIGHT) HOURS AS NEEDED FOR NAUSEA OR VOMITING., Disp: 30 tablet, Rfl: 4  •  pravastatin (PRAVACHOL) 40 MG tablet, Take 1 tablet by mouth Daily., Disp: 90 tablet, Rfl: 3  •  prochlorperazine (COMPAZINE) 10 MG tablet, Take 1 tablet by mouth Every 6 (Six) Hours As Needed for Nausea or Vomiting., Disp: 60 tablet, Rfl: 5  •  Stiolto Respimat 2.5-2.5 MCG/ACT aerosol solution inhaler, INHALE 2 PUFFS BY  "MOUTH EVERY DAY, Disp: 1 inhaler, Rfl: 3  •  traMADol (ULTRAM) 50 MG tablet, Take 1 tablet by mouth Every 8 (Eight) Hours As Needed for Severe Pain ., Disp: 270 tablet, Rfl: 1    Current Facility-Administered Medications:   •  heparin injection 500 Units, 500 Units, Intracatheter, Q8H PRN, Kathe Higuera, APRN, 500 Units at 11/17/20 1116    PHYSICAL EXAMINATION:   /63   Pulse 115   Temp 97.7 °F (36.5 °C) (Temporal)   Resp 16   Ht 157.5 cm (62.01\")   Wt 42.2 kg (93 lb)   SpO2 97%   BMI 17.01 kg/m²    ECOG Performance Status: 1 - Symptomatic but completely ambulatory  General Appearance:  alert, cooperative, no apparent distress, appears stated age and normal weight   Neurologic/Psychiatric: A&O x 3, gait steady, appropriate affect, strength 5/5 in all muscle groups   HEENT:  Normocephalic, without obvious abnormality, mucous membranes moist   Neck: Supple, symmetrical, trachea midline, no adenopathy;  No thyromegaly, masses, or tenderness   Lungs:   Clear to auscultation bilaterally; respirations regular, even, and unlabored bilaterally   Heart:  Regular rate and rhythm, no murmurs appreciated   Abdomen:   Soft, non-tender, non-distended and no organomegaly   Lymph nodes: No cervical, supraclavicular, inguinal or axillary adenopathy noted.  Submandibular nodule noted approximately 3cm   Extremities: Normal, atraumatic; no clubbing, cyanosis, or edema    Skin: No rashes, ulcers, or suspicious lesions noted     No visits with results within 2 Week(s) from this visit.   Latest known visit with results is:   Infusion on 01/06/2021   Component Date Value Ref Range Status   • Glucose 01/06/2021 83  65 - 99 mg/dL Final   • BUN 01/06/2021 15  8 - 23 mg/dL Final   • Creatinine 01/06/2021 0.54* 0.57 - 1.00 mg/dL Final   • Sodium 01/06/2021 129* 136 - 145 mmol/L Final   • Potassium 01/06/2021 4.2  3.5 - 5.2 mmol/L Final   • Chloride 01/06/2021 97* 98 - 107 mmol/L Final   • CO2 01/06/2021 25.2  22.0 - 29.0 mmol/L " Final   • Calcium 01/06/2021 7.6* 8.6 - 10.5 mg/dL Final   • Total Protein 01/06/2021 5.2* 6.0 - 8.5 g/dL Final   • Albumin 01/06/2021 2.20* 3.50 - 5.20 g/dL Final   • ALT (SGPT) 01/06/2021 7  1 - 33 U/L Final   • AST (SGOT) 01/06/2021 12  1 - 32 U/L Final   • Alkaline Phosphatase 01/06/2021 116  39 - 117 U/L Final   • Total Bilirubin 01/06/2021 0.2  0.0 - 1.2 mg/dL Final   • eGFR Non African Amer 01/06/2021 112  >60 mL/min/1.73 Final   • Globulin 01/06/2021 3.0  gm/dL Final   • A/G Ratio 01/06/2021 0.7  g/dL Final   • BUN/Creatinine Ratio 01/06/2021 27.8* 7.0 - 25.0 Final   • Anion Gap 01/06/2021 6.8  5.0 - 15.0 mmol/L Final   • Magnesium 01/06/2021 1.7  1.6 - 2.4 mg/dL Final   • WBC 01/06/2021 8.61  3.40 - 10.80 10*3/mm3 Final   • RBC 01/06/2021 2.88* 3.77 - 5.28 10*6/mm3 Final   • Hemoglobin 01/06/2021 9.7* 12.0 - 15.9 g/dL Final   • Hematocrit 01/06/2021 29.4* 34.0 - 46.6 % Final   • MCV 01/06/2021 102.1* 79.0 - 97.0 fL Final   • MCH 01/06/2021 33.7* 26.6 - 33.0 pg Final   • MCHC 01/06/2021 33.0  31.5 - 35.7 g/dL Final   • RDW 01/06/2021 17.3* 12.3 - 15.4 % Final   • RDW-SD 01/06/2021 65.0* 37.0 - 54.0 fl Final   • MPV 01/06/2021 8.8  6.0 - 12.0 fL Final   • Platelets 01/06/2021 293  140 - 450 10*3/mm3 Final   • Neutrophil % 01/06/2021 61.5  42.7 - 76.0 % Final   • Lymphocyte % 01/06/2021 26.0  19.6 - 45.3 % Final   • Monocyte % 01/06/2021 11.4  5.0 - 12.0 % Final   • Eosinophil % 01/06/2021 0.0* 0.3 - 6.2 % Final   • Basophil % 01/06/2021 0.5  0.0 - 1.5 % Final   • Immature Grans % 01/06/2021 0.6* 0.0 - 0.5 % Final   • Neutrophils, Absolute 01/06/2021 5.30  1.70 - 7.00 10*3/mm3 Final   • Lymphocytes, Absolute 01/06/2021 2.24  0.70 - 3.10 10*3/mm3 Final   • Monocytes, Absolute 01/06/2021 0.98* 0.10 - 0.90 10*3/mm3 Final   • Eosinophils, Absolute 01/06/2021 0.00  0.00 - 0.40 10*3/mm3 Final   • Basophils, Absolute 01/06/2021 0.04  0.00 - 0.20 10*3/mm3 Final   • Immature Grans, Absolute 01/06/2021 0.05  0.00 - 0.05  10*3/mm3 Final   • nRBC 01/06/2021 0.0  0.0 - 0.2 /100 WBC Final      No results found.    ASSESSMENT: The patient is a very pleasant 70 y.o. female  with right upper lobe extensive stage small cell lung cancer     PROBLEM LIST:   1.  Right upper lobe extensive stage small cell lung cancer, [id number] stage IVb:  A.  Presented with right lower ribs pain  B.  CT chest revealed 3.5 cm right upper lobe lung mass, right adrenal nodule, mediastinal adenopathy, and left cervical lymphadenopathy.  C.  Status post left cervical lymph node biopsy done by Dr. Owen December 2, 2019  D.  Pathology consistent with small cell carcinoma  E.  Started palliative treatment with carboplatin and etoposide and Tecentriq December 15, 2019, status post 8 cycles  F.  Progressive disease documented CT scans done on June 1, 2020 with a new cervical lymphadenopathy and relapsed right adrenal mass  G.  Started Opdivo plus Yervoy Brenda 10, 2020, status post 4 cycle followed by Opdivo single agent with unfortunately progressive disease documented on CT scans done December 7, 2020.  H.  Zepzelca with 25% dose reduction started December 16, 2020, and is post 2 cycle  2.  COPD  3.  Hypertension  4.  Hypercholesterolemia  5.  Hypothyroid  6.  GERD  7.  Progressive nausea and vomiting  8. Anxiety  9. Cancer related pain  10. Treatment related neuropathy    PLAN:  1. We will proceed with Zepzelca once every 3 weeks with 25% dose reduction given her borderline performance status and previous chemotherapy and immunotherapy exposure cycle #3.  2. She will continue to keep her feet elevated and compression stockings for bilateral lower extremity edema.   3. I will continue to monitor the patient blood work including blood counts kidney function liver function electrolytes function.  4.  She will follow-up in 3 weeks for cycle #4.  5.  I will repeat the patient scans after cycle #3.  We will order prior to return.  6. We will continue omeprazole for GERD.    7.  I will continue patient on Zofran as needed for chemotherapy-induced nausea as well as phenergan.  8. We reviewed potential side effects of treatment including alopecia, nausea vomiting, pancytopenia, neutropenic fever, and potential death.  9. We will continue ativan for anxiety. I will add a dose today prior to treatment.   10. We will continue ultram as needed for cancer related pain. She will contact office if pain worsens or is not controlled.   11. We will change her gabapentin for treatment related neuropathy to Lyrica.  She has failed gabapentin.  Her neuropathy symptoms have worsened.      Kathe Higuera, APRN  1/27/2021

## 2021-02-02 ENCOUNTER — DOCUMENTATION (OUTPATIENT)
Dept: NUTRITION | Facility: HOSPITAL | Age: 71
End: 2021-02-02

## 2021-02-02 NOTE — PROGRESS NOTES
Nutrition Services    Patient Name:  Prashanth Prajapati  YOB: 1950  MRN: [id number]  Admit Date:  (Not on file)    RD spoke with pt over the phone regarding nutrition for oncology. Pt states her appetite is still poor and has an upset stomach occasionally. Her nausea has improved. She is still drinking ~1 protein shake per day. Pt states she has lost ~6-7 pounds, however her fluid rentention has resolved. RD encouraged a high-calorie, high-protein diet with plenty of fluids. RD answered pt questions. RD encouraged pt to reach out with anymore questions or concerns. RD available PRN.     Electronically signed by:  Nini eMsa RD  02/02/21 10:51 EST

## 2021-02-24 ENCOUNTER — HOSPITAL ENCOUNTER (OUTPATIENT)
Dept: CT IMAGING | Facility: HOSPITAL | Age: 71
Discharge: HOME | End: 2021-02-24

## 2021-02-24 DIAGNOSIS — C34.90 SCLC (SMALL CELL LUNG CARCINOMA) (HCC): ICD-10-CM

## 2021-02-24 PROCEDURE — 70491 CT SOFT TISSUE NECK W/DYE: CPT

## 2021-02-24 PROCEDURE — 74177 CT ABD & PELVIS W/CONTRAST: CPT

## 2021-02-24 PROCEDURE — 71260 CT THORAX DX C+: CPT

## 2021-02-24 PROCEDURE — 25010000002 IOPAMIDOL 61 % SOLUTION: Performed by: NURSE PRACTITIONER

## 2021-02-24 RX ADMIN — IOPAMIDOL 100 ML: 612 INJECTION, SOLUTION INTRAVENOUS at 17:30

## 2021-02-26 ENCOUNTER — OFFICE VISIT (OUTPATIENT)
Dept: ONCOLOGY | Facility: CLINIC | Age: 71
End: 2021-02-26

## 2021-02-26 ENCOUNTER — HOSPITAL ENCOUNTER (OUTPATIENT)
Dept: GENERAL RADIOLOGY | Facility: HOSPITAL | Age: 71
Discharge: HOME | End: 2021-02-26
Admitting: NURSE PRACTITIONER

## 2021-02-26 VITALS
OXYGEN SATURATION: 96 % | HEART RATE: 72 BPM | SYSTOLIC BLOOD PRESSURE: 122 MMHG | TEMPERATURE: 97.7 F | BODY MASS INDEX: 18.77 KG/M2 | DIASTOLIC BLOOD PRESSURE: 75 MMHG | WEIGHT: 102 LBS | HEIGHT: 62 IN | RESPIRATION RATE: 12 BRPM

## 2021-02-26 DIAGNOSIS — C80.1 NEUROPATHY ASSOCIATED WITH CANCER (HCC): ICD-10-CM

## 2021-02-26 DIAGNOSIS — G63 NEUROPATHY ASSOCIATED WITH CANCER (HCC): ICD-10-CM

## 2021-02-26 PROCEDURE — 73502 X-RAY EXAM HIP UNI 2-3 VIEWS: CPT

## 2021-02-26 PROCEDURE — 99215 OFFICE O/P EST HI 40 MIN: CPT | Performed by: NURSE PRACTITIONER

## 2021-02-26 RX ORDER — GABAPENTIN 400 MG/1
CAPSULE ORAL
Qty: 270 CAPSULE | Refills: 1 | Status: SHIPPED | OUTPATIENT
Start: 2021-02-26

## 2021-02-26 RX ORDER — SODIUM CHLORIDE 0.9 % (FLUSH) 0.9 %
10 SYRINGE (ML) INJECTION AS NEEDED
Status: CANCELLED | OUTPATIENT
Start: 2021-02-26

## 2021-02-26 RX ORDER — HEPARIN SODIUM (PORCINE) LOCK FLUSH IV SOLN 100 UNIT/ML 100 UNIT/ML
500 SOLUTION INTRAVENOUS AS NEEDED
Status: CANCELLED | OUTPATIENT
Start: 2021-02-26

## 2021-02-26 NOTE — PROGRESS NOTES
DATE OF VISIT: 2/26/2021    REASON FOR VISIT: Followup for extensive stage small cell lung cancer     HISTORY OF PRESENT ILLNESS: The patient is a very pleasant 70 y.o. female with past medical history significant for extensive stage small cell lung cancer diagnosed December 2, 2019.  She was started on palliative treatment with carboplatin and etoposide and Tecentriq December 15, 2019. Tecentriq maintenance started 03/11/2020.  The patient had progressive disease and she was switched to Opdivo plus Yervoy.  She completed 4 cycles August 13, 2020.  This was followed by Opdivo single agent.  Repeat scans done December 7, 2020 revealed progressive disease.  She was started on third line treatment using Zepzelca December 2020.  The patient is here today for follow up visit.     SUBJECTIVE: Filippo is here today with her sister-in-law.  She has gained about 8 pounds since her last visit.  She feels like her appetite has improved.  She is eating better since living with her sister.  She cooks for her.  She is using protein shakes to help with her appetite and protein volume.  She complains about peripheral neuropathy that has seemed to worsen in the past couple weeks the Lyrica is not really working.  She would like to go back on previous gabapentin dose.      PAST MEDICAL HISTORY/SOCIAL HISTORY/FAMILY HISTORY: Reviewed by me and unchanged from my documentation done on 02/26/21.    Review of Systems   Constitutional: Positive for fatigue. Negative for activity change, appetite change, chills, fever and unexpected weight change.   HENT: Negative for hearing loss, mouth sores, nosebleeds, sore throat and trouble swallowing.    Eyes: Negative for visual disturbance.   Respiratory: Negative for cough, chest tightness, shortness of breath and wheezing.    Cardiovascular: Negative for chest pain and palpitations.   Gastrointestinal: Positive for constipation, nausea and vomiting. Negative for abdominal distention, abdominal pain,  blood in stool, diarrhea and rectal pain.   Endocrine: Negative for cold intolerance and heat intolerance.   Genitourinary: Negative for difficulty urinating, dysuria, frequency and urgency.   Musculoskeletal: Negative for arthralgias, back pain, gait problem, joint swelling and myalgias.   Skin: Negative for rash.   Neurological: Negative for dizziness, tremors, syncope, weakness, light-headedness, numbness and headaches.   Hematological: Negative for adenopathy. Does not bruise/bleed easily.   Psychiatric/Behavioral: Negative for confusion, sleep disturbance and suicidal ideas. The patient is not nervous/anxious.          Current Outpatient Medications:   •  acetaminophen (TYLENOL) 500 MG tablet, Take 500 mg by mouth Every 6 (Six) Hours As Needed for Mild Pain ., Disp: , Rfl:   •  albuterol sulfate HFA (Ventolin HFA) 108 (90 Base) MCG/ACT inhaler, Inhale 2 puffs Every 4 (Four) Hours As Needed for Wheezing or Shortness of Air., Disp: 18 g, Rfl: 5  •  amLODIPine (NORVASC) 5 MG tablet, Take 1 tablet by mouth Daily., Disp: 90 tablet, Rfl: 3  •  baclofen (LIORESAL) 10 MG tablet, START- ONE TAB NIGHTLY X7 DAYS, THEN 1 TAB 2X A DAY X7 DAYS, THEN 1 TAB 3X A DAY, Disp: 270 tablet, Rfl: 1  •  Calcium Carbonate-Vitamin D (CALCIUM-CARB 600 + D) 600-125 MG-UNIT tablet, Take 500 mg by mouth 2 (Two) Times a Day., Disp: 60 each, Rfl: 5  •  diclofenac (VOLTAREN) 75 MG EC tablet, Take 1 tablet by mouth Daily As Needed (pain)., Disp: 90 tablet, Rfl: 3  •  docusate sodium (COLACE) 100 MG capsule, Take 1 tablet twice daily, Disp: 60 capsule, Rfl: 3  •  furosemide (LASIX) 20 MG tablet, Take 1 tablet by mouth Daily As Needed (leg swelling) for up to 5 doses., Disp: 5 tablet, Rfl: 0  •  gabapentin (NEURONTIN) 400 MG capsule, Take 1 capsule by mouth Daily With Breakfast & Lunch. May also take 2 capsules At Night As Needed (neuropathy, pain)., Disp: 270 capsule, Rfl: 1  •  ipratropium-albuterol (DUO-NEB) 0.5-2.5 mg/3 ml nebulizer, Take 3  mL by nebulization 4 (Four) Times a Day As Needed for Wheezing or Shortness of Air., Disp: 120 mL, Rfl: 5  •  levothyroxine (SYNTHROID, LEVOTHROID) 88 MCG tablet, Take 1 tablet by mouth Daily. Indications: Underactive Thyroid, Disp: 90 tablet, Rfl: 3  •  lidocaine (LIDODERM) 5 %, Place 1 patch on the skin as directed by provider Daily. Remove & Discard patch within 12 hours or as directed by MD, Disp: 90 each, Rfl: 3  •  Lidocaine Viscous HCl (XYLOCAINE) 2 % solution, , Disp: , Rfl:   •  lidocaine-prilocaine (EMLA) 2.5-2.5 % cream, Apply  topically to the appropriate area as directed As Needed (45-60 minutes prior to port access.  Cover with saran/plastic wrap.)., Disp: 30 g, Rfl: 3  •  LORazepam (Ativan) 1 MG tablet, Take 1 tablet by mouth Every 8 (Eight) Hours As Needed for Anxiety., Disp: 90 tablet, Rfl: 2  •  magic mouthwash oral suspension, Swish and spit (or swallow) 1-2 Teaspoonfuls (5-10ml) 4 times a day as needed, Disp: 240 mL, Rfl: 3  •  methylPREDNISolone (MEDROL) 4 MG dose pack, Take as directed on package instructions., Disp: 21 tablet, Rfl: 0  •  Nebulizer device, 1 Device Take As Directed., Disp: 1 each, Rfl: 0  •  OLANZapine (zyPREXA) 5 MG tablet, Take 1 tablet by mouth Every Night., Disp: 30 tablet, Rfl: 5  •  omeprazole (priLOSEC) 40 MG capsule, TAKE 1 CAPSULE BY MOUTH EVERY DAY, Disp: 90 capsule, Rfl: 2  •  ondansetron (ZOFRAN) 8 MG tablet, Take 1 tablet by mouth 3 (Three) Times a Day As Needed for Nausea or Vomiting., Disp: 30 tablet, Rfl: 5  •  ondansetron ODT (ZOFRAN-ODT) 8 MG disintegrating tablet, PLACE 1 TABLET ON THE TONGUE EVERY 8 (EIGHT) HOURS AS NEEDED FOR NAUSEA OR VOMITING., Disp: 30 tablet, Rfl: 4  •  pravastatin (PRAVACHOL) 40 MG tablet, Take 1 tablet by mouth Daily., Disp: 90 tablet, Rfl: 3  •  pregabalin (LYRICA) 50 MG capsule, Take 1 capsule by mouth 2 (Two) Times a Day., Disp: 60 capsule, Rfl: 3  •  prochlorperazine (COMPAZINE) 10 MG tablet, Take 1 tablet by mouth Every 6 (Six)  "Hours As Needed for Nausea or Vomiting., Disp: 60 tablet, Rfl: 5  •  Stiolto Respimat 2.5-2.5 MCG/ACT aerosol solution inhaler, INHALE 2 PUFFS BY MOUTH EVERY DAY, Disp: 1 inhaler, Rfl: 3  •  traMADol (ULTRAM) 50 MG tablet, Take 1 tablet by mouth Every 8 (Eight) Hours As Needed for Severe Pain ., Disp: 270 tablet, Rfl: 1    Current Facility-Administered Medications:   •  heparin injection 500 Units, 500 Units, Intracatheter, Q8H PRN, Kathe Higuera R, APRN, 500 Units at 01/27/21 1541    PHYSICAL EXAMINATION:   /75   Pulse 72   Temp 97.7 °F (36.5 °C) (Temporal)   Resp 12   Ht 157.5 cm (62\")   Wt 46.3 kg (102 lb)   SpO2 96%   BMI 18.66 kg/m²    ECOG Performance Status: 1 - Symptomatic but completely ambulatory  General Appearance:  alert, cooperative, no apparent distress, appears stated age and normal weight   Neurologic/Psychiatric: A&O x 3, gait steady, appropriate affect, strength 5/5 in all muscle groups   HEENT:  Normocephalic, without obvious abnormality, mucous membranes moist   Neck: Supple, symmetrical, trachea midline, no adenopathy;  No thyromegaly, masses, or tenderness   Lungs:   Clear to auscultation bilaterally; respirations regular, even, and unlabored bilaterally   Heart:  Regular rate and rhythm, no murmurs appreciated   Abdomen:   Soft, non-tender, non-distended and no organomegaly   Lymph nodes: No cervical, supraclavicular, inguinal or axillary adenopathy noted.  Submandibular nodule noted approximately 5cm on right. Left sternocleidomastoid mass approximately 3cm.    Extremities: Normal, atraumatic; no clubbing, cyanosis, or edema    Skin: No rashes, ulcers, or suspicious lesions noted     No visits with results within 2 Week(s) from this visit.   Latest known visit with results is:   Infusion on 01/27/2021   Component Date Value Ref Range Status   • Glucose 01/27/2021 76  65 - 99 mg/dL Final   • BUN 01/27/2021 17  8 - 23 mg/dL Final   • Creatinine 01/27/2021 0.54* 0.57 - 1.00 mg/dL " Final   • Sodium 01/27/2021 128* 136 - 145 mmol/L Final   • Potassium 01/27/2021 4.1  3.5 - 5.2 mmol/L Final   • Chloride 01/27/2021 96* 98 - 107 mmol/L Final   • CO2 01/27/2021 25.6  22.0 - 29.0 mmol/L Final   • Calcium 01/27/2021 7.6* 8.6 - 10.5 mg/dL Final   • Total Protein 01/27/2021 5.1* 6.0 - 8.5 g/dL Final   • Albumin 01/27/2021 2.10* 3.50 - 5.20 g/dL Final   • ALT (SGPT) 01/27/2021 7  1 - 33 U/L Final   • AST (SGOT) 01/27/2021 16  1 - 32 U/L Final   • Alkaline Phosphatase 01/27/2021 132* 39 - 117 U/L Final   • Total Bilirubin 01/27/2021 0.3  0.0 - 1.2 mg/dL Final   • eGFR Non  Amer 01/27/2021 112  >60 mL/min/1.73 Final   • Globulin 01/27/2021 3.0  gm/dL Final   • A/G Ratio 01/27/2021 0.7  g/dL Final   • BUN/Creatinine Ratio 01/27/2021 31.5* 7.0 - 25.0 Final   • Anion Gap 01/27/2021 6.4  5.0 - 15.0 mmol/L Final   • Magnesium 01/27/2021 1.7  1.6 - 2.4 mg/dL Final   • WBC 01/27/2021 10.12  3.40 - 10.80 10*3/mm3 Final   • RBC 01/27/2021 2.66* 3.77 - 5.28 10*6/mm3 Final   • Hemoglobin 01/27/2021 8.9* 12.0 - 15.9 g/dL Final   • Hematocrit 01/27/2021 27.7* 34.0 - 46.6 % Final   • MCV 01/27/2021 104.1* 79.0 - 97.0 fL Final   • MCH 01/27/2021 33.5* 26.6 - 33.0 pg Final   • MCHC 01/27/2021 32.1  31.5 - 35.7 g/dL Final   • RDW 01/27/2021 14.1  12.3 - 15.4 % Final   • RDW-SD 01/27/2021 54.4* 37.0 - 54.0 fl Final   • MPV 01/27/2021 8.5  6.0 - 12.0 fL Final   • Platelets 01/27/2021 303  140 - 450 10*3/mm3 Final   • Neutrophil % 01/27/2021 67.0  42.7 - 76.0 % Final   • Lymphocyte % 01/27/2021 17.0* 19.6 - 45.3 % Final   • Monocyte % 01/27/2021 15.0* 5.0 - 12.0 % Final   • Eosinophil % 01/27/2021 0.0* 0.3 - 6.2 % Final   • Basophil % 01/27/2021 0.4  0.0 - 1.5 % Final   • Immature Grans % 01/27/2021 0.6* 0.0 - 0.5 % Final   • Neutrophils, Absolute 01/27/2021 6.78  1.70 - 7.00 10*3/mm3 Final   • Lymphocytes, Absolute 01/27/2021 1.72  0.70 - 3.10 10*3/mm3 Final   • Monocytes, Absolute 01/27/2021 1.52* 0.10 - 0.90  10*3/mm3 Final   • Eosinophils, Absolute 01/27/2021 0.00  0.00 - 0.40 10*3/mm3 Final   • Basophils, Absolute 01/27/2021 0.04  0.00 - 0.20 10*3/mm3 Final   • Immature Grans, Absolute 01/27/2021 0.06* 0.00 - 0.05 10*3/mm3 Final   • nRBC 01/27/2021 0.0  0.0 - 0.2 /100 WBC Final      Ct Soft Tissue Neck With Contrast    Result Date: 2/25/2021  Narrative: PROCEDURE: CT CHEST W CONTRAST DIAGNOSTIC-, CT SOFT TISSUE NECK W CONTRAST-, CT ABDOMEN PELVIS W CONTRAST-  HISTORY: Follow-up extensive stage small cell lung cancer; C34.90-Malignant neoplasm of unspecified part of unspecified bronchus or lung  COMPARISON: December 7, 2020.  PROCEDURE: Axial images were obtained from the skull base to the pubic symphysis by computed tomography following the administration of Isovue-300 contrast.  FINDINGS:  NECK: There has been significant interval enlargement in a right submandibular mass which measures 5.1 x 4.2 x 3.6 cm on today's exam and previously measured 2.9 x 1.9 x 3.0 cm. A previously noted mass deep to the right sternocleidomastoid muscle has also increased in size measuring 2.9 x 1.9 cm and previously measuring 1.7 x 1.3 cm. The mass deep to the left sternocleidomastoid muscle has also increased in size measuring 2.5 x 2.3 cm and previously measuring 0.9 x 0.7 cm.  CHEST: Soft tissue windows redemonstrate subcentimeter mediastinal lymph nodes which are unchanged. There are small bilateral pleural effusions. There is no pericardial effusion. Lung windows reveal centrilobular emphysema. Scarring is seen in the right lung apex and both lung bases. There are no suspicious pulmonary nodules. A calcified granuloma in the lingula is unchanged. Bone windows reveal no lytic or destructive lesions.  ABDOMEN: An enhancing mass in the right upper quadrant superior to the kidney has increased significantly in size compared to the prior exam measuring 11.4 x 10.3 cm and previously measuring 6.3 x 6.6 cm. There is an approximately 2.9 cm  left adrenal mass. There is retroperitoneal adenopathy, the largest of which is a left periaortic lymph node measuring 2.2 cm. The liver, spleen, pancreas and kidneys are unremarkable. The abdominal portions of the GI tract are unremarkable.  PELVIS: The pelvic viscera and pelvic GI tract is unremarkable. There is a small amount of fluid in the pelvis. Bone windows reveal no lytic or destructive lesions.      Impression: Interval progression of the patient's metastatic disease with significant interval enlargement in bilateral neck masses, a right adrenal mass and retroperitoneal adenopathy.         This study was performed with techniques to keep radiation doses as low as reasonably achievable (ALARA). Individualized dose reduction techniques using automated exposure control or adjustment of mA and/or kV according to the patient size were employed.    This report was finalized on 2/25/2021 9:09 AM by Pamela Oliveros M.D..    Ct Chest With Contrast Diagnostic    Result Date: 2/25/2021  Narrative: PROCEDURE: CT CHEST W CONTRAST DIAGNOSTIC-, CT SOFT TISSUE NECK W CONTRAST-, CT ABDOMEN PELVIS W CONTRAST-  HISTORY: Follow-up extensive stage small cell lung cancer; C34.90-Malignant neoplasm of unspecified part of unspecified bronchus or lung  COMPARISON: December 7, 2020.  PROCEDURE: Axial images were obtained from the skull base to the pubic symphysis by computed tomography following the administration of Isovue-300 contrast.  FINDINGS:  NECK: There has been significant interval enlargement in a right submandibular mass which measures 5.1 x 4.2 x 3.6 cm on today's exam and previously measured 2.9 x 1.9 x 3.0 cm. A previously noted mass deep to the right sternocleidomastoid muscle has also increased in size measuring 2.9 x 1.9 cm and previously measuring 1.7 x 1.3 cm. The mass deep to the left sternocleidomastoid muscle has also increased in size measuring 2.5 x 2.3 cm and previously measuring 0.9 x 0.7 cm.  CHEST:  Soft tissue windows redemonstrate subcentimeter mediastinal lymph nodes which are unchanged. There are small bilateral pleural effusions. There is no pericardial effusion. Lung windows reveal centrilobular emphysema. Scarring is seen in the right lung apex and both lung bases. There are no suspicious pulmonary nodules. A calcified granuloma in the lingula is unchanged. Bone windows reveal no lytic or destructive lesions.  ABDOMEN: An enhancing mass in the right upper quadrant superior to the kidney has increased significantly in size compared to the prior exam measuring 11.4 x 10.3 cm and previously measuring 6.3 x 6.6 cm. There is an approximately 2.9 cm left adrenal mass. There is retroperitoneal adenopathy, the largest of which is a left periaortic lymph node measuring 2.2 cm. The liver, spleen, pancreas and kidneys are unremarkable. The abdominal portions of the GI tract are unremarkable.  PELVIS: The pelvic viscera and pelvic GI tract is unremarkable. There is a small amount of fluid in the pelvis. Bone windows reveal no lytic or destructive lesions.      Impression: Interval progression of the patient's metastatic disease with significant interval enlargement in bilateral neck masses, a right adrenal mass and retroperitoneal adenopathy.         This study was performed with techniques to keep radiation doses as low as reasonably achievable (ALARA). Individualized dose reduction techniques using automated exposure control or adjustment of mA and/or kV according to the patient size were employed.    This report was finalized on 2/25/2021 9:09 AM by Pamela Oliveros M.D..    Ct Abdomen Pelvis With Contrast    Result Date: 2/25/2021  Narrative: PROCEDURE: CT CHEST W CONTRAST DIAGNOSTIC-, CT SOFT TISSUE NECK W CONTRAST-, CT ABDOMEN PELVIS W CONTRAST-  HISTORY: Follow-up extensive stage small cell lung cancer; C34.90-Malignant neoplasm of unspecified part of unspecified bronchus or lung  COMPARISON: December 7,  2020.  PROCEDURE: Axial images were obtained from the skull base to the pubic symphysis by computed tomography following the administration of Isovue-300 contrast.  FINDINGS:  NECK: There has been significant interval enlargement in a right submandibular mass which measures 5.1 x 4.2 x 3.6 cm on today's exam and previously measured 2.9 x 1.9 x 3.0 cm. A previously noted mass deep to the right sternocleidomastoid muscle has also increased in size measuring 2.9 x 1.9 cm and previously measuring 1.7 x 1.3 cm. The mass deep to the left sternocleidomastoid muscle has also increased in size measuring 2.5 x 2.3 cm and previously measuring 0.9 x 0.7 cm.  CHEST: Soft tissue windows redemonstrate subcentimeter mediastinal lymph nodes which are unchanged. There are small bilateral pleural effusions. There is no pericardial effusion. Lung windows reveal centrilobular emphysema. Scarring is seen in the right lung apex and both lung bases. There are no suspicious pulmonary nodules. A calcified granuloma in the lingula is unchanged. Bone windows reveal no lytic or destructive lesions.  ABDOMEN: An enhancing mass in the right upper quadrant superior to the kidney has increased significantly in size compared to the prior exam measuring 11.4 x 10.3 cm and previously measuring 6.3 x 6.6 cm. There is an approximately 2.9 cm left adrenal mass. There is retroperitoneal adenopathy, the largest of which is a left periaortic lymph node measuring 2.2 cm. The liver, spleen, pancreas and kidneys are unremarkable. The abdominal portions of the GI tract are unremarkable.  PELVIS: The pelvic viscera and pelvic GI tract is unremarkable. There is a small amount of fluid in the pelvis. Bone windows reveal no lytic or destructive lesions.      Impression: Interval progression of the patient's metastatic disease with significant interval enlargement in bilateral neck masses, a right adrenal mass and retroperitoneal adenopathy.         This study was  performed with techniques to keep radiation doses as low as reasonably achievable (ALARA). Individualized dose reduction techniques using automated exposure control or adjustment of mA and/or kV according to the patient size were employed.    This report was finalized on 2/25/2021 9:09 AM by Pamela Oliveros M.D..      Chemotherapy Regimen:   Treatment Plans     Name Type Plan dates Plan Provider         Active     ONCOLOGY TREATMENT  Present                     TOPICS EDUCATION PROVIDED COMMENTS   ANEMIA:  role of RBC, cause, s/s, ways to manage, role of transfusion [x]    THROMBOCYTOPENIA:  role of platelet, cause, s/s, ways to prevent bleeding, things to avoid, when to seek help [x]    NEUTROPENIA:  role of WBC, cause, infection precautions, s/s of infection, when to call MD [x]    NUTRITION & APPETITE CHANGES:  importance of maintaining healthy diet & weight, ways to manage to improve intake, dietary consult, exercise regimen [x]    DIARRHEA:  causes, s/s of dehydration, ways to manage, dietary changes, when to call MD [x]    CONSTIPATION:  causes, ways to manage, dietary changes, when to call MD [x]    NAUSEA & VOMITING:  cause, use of antiemetics, dietary changes, when to call MD [x]    MOUTH SORES:  causes, oral care, ways to manage [x]    ALOPECIA:  cause, ways to manage, resources [x]    INFERTILITY & SEXUALITY:  causes, fertility preservation options, sexuality changes, ways to manage, importance of birth control [x]    NERVOUS SYSTEM CHANGES:  causes, s/s, neuropathies, cognitive changes, ways to manage [x]    PAIN:  causes, ways to manage [x] ????   SKIN & NAIL CHANGES:  cause, s/s, ways to manage [x]    ORGAN TOXICITIES:  cause, s/s, need for diagnostic tests, labs, when to notify MD [x]    SURVIVORSHIP:  distress, distress assessment, secondary malignancies, early/late effects, follow-up, social issues, social support [x]    HOME CARE:  use of spill kits, storing of PO chemo, how to manage bodily  fluids [x]    MISCELLANEOUS:  drug interactions, administration, vesicant, et [x]      ASSESSMENT: The patient is a very pleasant 70 y.o. female  with right upper lobe extensive stage small cell lung cancer     PROBLEM LIST:   1.  Right upper lobe extensive stage small cell lung cancer, [id number] stage IVb:  A.  Presented with right lower ribs pain  B.  CT chest revealed 3.5 cm right upper lobe lung mass, right adrenal nodule, mediastinal adenopathy, and left cervical lymphadenopathy.  C.  Status post left cervical lymph node biopsy done by Dr. Owen December 2, 2019  D.  Pathology consistent with small cell carcinoma  E.  Started palliative treatment with carboplatin and etoposide and Tecentriq December 15, 2019, status post 8 cycles  F.  Progressive disease documented CT scans done on June 1, 2020 with a new cervical lymphadenopathy and relapsed right adrenal mass  G.  Started Opdivo plus Yervoy Brenda 10, 2020, status post 4 cycle followed by Opdivo single agent with unfortunately progressive disease documented on CT scans done December 7, 2020.  H.  Zepzelca with 25% dose reduction started December 16, 2020, and is post 2 cycle  I.  Progressive disease documented on CT scans done on February 24, 2021 with Interval progression of the patient's metastatic disease  with significant interval enlargement in bilateral neck masses, a right adrenal mass and retroperitoneal adenopathy  J.  We will plan to start topotecan on March 8, 2021.  2.  COPD  3.  Hypertension  4.  Hypercholesterolemia  5.  Hypothyroid  6.  GERD  7.  Progressive nausea and vomiting  8. Anxiety  9. Cancer related pain  10. Treatment related neuropathy    PLAN:  1. We will hold Zepzelca due to progression on CT contrast scan.  2.  I discussed with the patient the CT scan showed interval progression of metastatic disease with significant enlargement and bilateral neck masses, a right adrenal mass, and no retroperitoneal adenopathy.  I reviewed the films  myself.  3. We discussed side effects of topotecan include Nausea, vomiting, constipation, diarrhea, abdominal pain, weakness, tiredness, and mouth sores.  4.She will continue to keep her feet elevated and compression stockings for bilateral lower extremity edema.   5. I will continue to monitor the patient blood work including blood counts kidney function liver function electrolytes function.  6.  She will follow-up in 2 weeks for cycle #1.  7.  I will repeat the patient scans after cycle #3.  We will order prior to return.  8. We will continue omeprazole for GERD.   9.  I will continue patient on Zofran as needed for chemotherapy-induced nausea as well as phenergan.  10. We will continue ativan for anxiety. I will add a dose today prior to treatment.   11. We will continue ultram as needed for cancer related pain. She will contact office if pain worsens or is not controlled.   11. We will change her lyrica for treatment related neuropathy to gabapentin at previous dose. Her neuropathy symptoms have worsened on lyrica.      Kathe Higuera, APRN  2/26/2021

## 2021-03-05 DIAGNOSIS — C34.90 SCLC (SMALL CELL LUNG CARCINOMA) (HCC): Primary | ICD-10-CM

## 2021-03-05 RX ORDER — SODIUM CHLORIDE 9 MG/ML
250 INJECTION, SOLUTION INTRAVENOUS ONCE
Status: CANCELLED | OUTPATIENT
Start: 2021-04-01

## 2021-03-05 RX ORDER — SODIUM CHLORIDE 9 MG/ML
250 INJECTION, SOLUTION INTRAVENOUS ONCE
Status: CANCELLED | OUTPATIENT
Start: 2021-03-11

## 2021-03-05 RX ORDER — SODIUM CHLORIDE 9 MG/ML
250 INJECTION, SOLUTION INTRAVENOUS ONCE
Status: CANCELLED | OUTPATIENT
Start: 2021-04-02

## 2021-03-05 RX ORDER — SODIUM CHLORIDE 9 MG/ML
250 INJECTION, SOLUTION INTRAVENOUS ONCE
Status: CANCELLED | OUTPATIENT
Start: 2021-03-08

## 2021-03-05 RX ORDER — SODIUM CHLORIDE 9 MG/ML
250 INJECTION, SOLUTION INTRAVENOUS ONCE
Status: CANCELLED | OUTPATIENT
Start: 2021-03-09

## 2021-03-05 RX ORDER — SODIUM CHLORIDE 9 MG/ML
250 INJECTION, SOLUTION INTRAVENOUS ONCE
Status: CANCELLED | OUTPATIENT
Start: 2021-03-31

## 2021-03-05 RX ORDER — SODIUM CHLORIDE 9 MG/ML
250 INJECTION, SOLUTION INTRAVENOUS ONCE
Status: CANCELLED | OUTPATIENT
Start: 2021-03-12

## 2021-03-05 RX ORDER — SODIUM CHLORIDE 9 MG/ML
250 INJECTION, SOLUTION INTRAVENOUS ONCE
Status: CANCELLED | OUTPATIENT
Start: 2021-03-29

## 2021-03-05 RX ORDER — SODIUM CHLORIDE 9 MG/ML
250 INJECTION, SOLUTION INTRAVENOUS ONCE
Status: CANCELLED | OUTPATIENT
Start: 2021-03-30

## 2021-03-05 RX ORDER — SODIUM CHLORIDE 9 MG/ML
250 INJECTION, SOLUTION INTRAVENOUS ONCE
Status: CANCELLED | OUTPATIENT
Start: 2021-03-10

## 2021-03-08 ENCOUNTER — INFUSION (OUTPATIENT)
Dept: ONCOLOGY | Facility: HOSPITAL | Age: 71
End: 2021-03-08

## 2021-03-08 VITALS
HEART RATE: 107 BPM | DIASTOLIC BLOOD PRESSURE: 65 MMHG | OXYGEN SATURATION: 98 % | TEMPERATURE: 98 F | BODY MASS INDEX: 20.16 KG/M2 | WEIGHT: 110.2 LBS | RESPIRATION RATE: 16 BRPM | SYSTOLIC BLOOD PRESSURE: 115 MMHG

## 2021-03-08 DIAGNOSIS — C34.90 SCLC (SMALL CELL LUNG CARCINOMA) (HCC): Primary | ICD-10-CM

## 2021-03-08 LAB
ALBUMIN SERPL-MCNC: 2.1 G/DL (ref 3.5–5.2)
ALBUMIN/GLOB SERPL: 0.7 G/DL
ALP SERPL-CCNC: 146 U/L (ref 39–117)
ALT SERPL W P-5'-P-CCNC: 9 U/L (ref 1–33)
ANION GAP SERPL CALCULATED.3IONS-SCNC: 7.9 MMOL/L (ref 5–15)
AST SERPL-CCNC: 23 U/L (ref 1–32)
BASOPHILS # BLD AUTO: 0.03 10*3/MM3 (ref 0–0.2)
BASOPHILS NFR BLD AUTO: 0.3 % (ref 0–1.5)
BILIRUB SERPL-MCNC: 0.2 MG/DL (ref 0–1.2)
BUN SERPL-MCNC: 17 MG/DL (ref 8–23)
BUN/CREAT SERPL: 21.8 (ref 7–25)
CALCIUM SPEC-SCNC: 7.4 MG/DL (ref 8.6–10.5)
CHLORIDE SERPL-SCNC: 98 MMOL/L (ref 98–107)
CO2 SERPL-SCNC: 22.1 MMOL/L (ref 22–29)
CREAT SERPL-MCNC: 0.78 MG/DL (ref 0.57–1)
DEPRECATED RDW RBC AUTO: 53.5 FL (ref 37–54)
EOSINOPHIL # BLD AUTO: 0.01 10*3/MM3 (ref 0–0.4)
EOSINOPHIL NFR BLD AUTO: 0.1 % (ref 0.3–6.2)
ERYTHROCYTE [DISTWIDTH] IN BLOOD BY AUTOMATED COUNT: 14.4 % (ref 12.3–15.4)
GFR SERPL CREATININE-BSD FRML MDRD: 73 ML/MIN/1.73
GLOBULIN UR ELPH-MCNC: 3.1 GM/DL
GLUCOSE SERPL-MCNC: 78 MG/DL (ref 65–99)
HCT VFR BLD AUTO: 27.3 % (ref 34–46.6)
HGB BLD-MCNC: 8.8 G/DL (ref 12–15.9)
IMM GRANULOCYTES # BLD AUTO: 0.05 10*3/MM3 (ref 0–0.05)
IMM GRANULOCYTES NFR BLD AUTO: 0.5 % (ref 0–0.5)
LYMPHOCYTES # BLD AUTO: 1.43 10*3/MM3 (ref 0.7–3.1)
LYMPHOCYTES NFR BLD AUTO: 13 % (ref 19.6–45.3)
MCH RBC QN AUTO: 32.8 PG (ref 26.6–33)
MCHC RBC AUTO-ENTMCNC: 32.2 G/DL (ref 31.5–35.7)
MCV RBC AUTO: 101.9 FL (ref 79–97)
MONOCYTES # BLD AUTO: 1.03 10*3/MM3 (ref 0.1–0.9)
MONOCYTES NFR BLD AUTO: 9.4 % (ref 5–12)
NEUTROPHILS NFR BLD AUTO: 76.7 % (ref 42.7–76)
NEUTROPHILS NFR BLD AUTO: 8.44 10*3/MM3 (ref 1.7–7)
NRBC BLD AUTO-RTO: 0 /100 WBC (ref 0–0.2)
PLATELET # BLD AUTO: 289 10*3/MM3 (ref 140–450)
PMV BLD AUTO: 9 FL (ref 6–12)
POTASSIUM SERPL-SCNC: 3.9 MMOL/L (ref 3.5–5.2)
PROT SERPL-MCNC: 5.2 G/DL (ref 6–8.5)
RBC # BLD AUTO: 2.68 10*6/MM3 (ref 3.77–5.28)
SODIUM SERPL-SCNC: 128 MMOL/L (ref 136–145)
WBC # BLD AUTO: 10.99 10*3/MM3 (ref 3.4–10.8)

## 2021-03-08 PROCEDURE — 96361 HYDRATE IV INFUSION ADD-ON: CPT

## 2021-03-08 PROCEDURE — 36415 COLL VENOUS BLD VENIPUNCTURE: CPT

## 2021-03-08 PROCEDURE — 25010000003 HEPARIN LOCK FLUSH PER 10 UNITS: Performed by: NURSE PRACTITIONER

## 2021-03-08 PROCEDURE — 96413 CHEMO IV INFUSION 1 HR: CPT

## 2021-03-08 PROCEDURE — 85025 COMPLETE CBC W/AUTO DIFF WBC: CPT

## 2021-03-08 PROCEDURE — 96375 TX/PRO/DX INJ NEW DRUG ADDON: CPT

## 2021-03-08 PROCEDURE — 25010000002 DEXAMETHASONE PER 1 MG: Performed by: INTERNAL MEDICINE

## 2021-03-08 PROCEDURE — 25010000002 ONDANSETRON PER 1 MG: Performed by: NURSE PRACTITIONER

## 2021-03-08 PROCEDURE — 25010000002 TOPOTECAN 4 MG/4ML SOLUTION 4 ML VIAL: Performed by: INTERNAL MEDICINE

## 2021-03-08 PROCEDURE — 80053 COMPREHEN METABOLIC PANEL: CPT

## 2021-03-08 RX ORDER — SODIUM CHLORIDE 9 MG/ML
750 INJECTION, SOLUTION INTRAVENOUS ONCE
Status: DISCONTINUED | OUTPATIENT
Start: 2021-03-08 | End: 2021-03-08 | Stop reason: HOSPADM

## 2021-03-08 RX ORDER — SODIUM CHLORIDE 9 MG/ML
1000 INJECTION, SOLUTION INTRAVENOUS ONCE
Status: COMPLETED | OUTPATIENT
Start: 2021-03-08 | End: 2021-03-08

## 2021-03-08 RX ORDER — HEPARIN SODIUM (PORCINE) LOCK FLUSH IV SOLN 100 UNIT/ML 100 UNIT/ML
500 SOLUTION INTRAVENOUS AS NEEDED
Status: CANCELLED | OUTPATIENT
Start: 2021-03-08

## 2021-03-08 RX ORDER — SODIUM CHLORIDE 0.9 % (FLUSH) 0.9 %
10 SYRINGE (ML) INJECTION AS NEEDED
Status: CANCELLED | OUTPATIENT
Start: 2021-03-08

## 2021-03-08 RX ORDER — HEPARIN SODIUM (PORCINE) LOCK FLUSH IV SOLN 100 UNIT/ML 100 UNIT/ML
500 SOLUTION INTRAVENOUS AS NEEDED
Status: DISCONTINUED | OUTPATIENT
Start: 2021-03-08 | End: 2021-03-08 | Stop reason: HOSPADM

## 2021-03-08 RX ORDER — ONDANSETRON 2 MG/ML
8 INJECTION INTRAMUSCULAR; INTRAVENOUS ONCE
Status: COMPLETED | OUTPATIENT
Start: 2021-03-08 | End: 2021-03-08

## 2021-03-08 RX ORDER — SODIUM CHLORIDE 9 MG/ML
250 INJECTION, SOLUTION INTRAVENOUS ONCE
Status: DISCONTINUED | OUTPATIENT
Start: 2021-03-08 | End: 2021-03-08 | Stop reason: HOSPADM

## 2021-03-08 RX ORDER — SODIUM CHLORIDE 0.9 % (FLUSH) 0.9 %
10 SYRINGE (ML) INJECTION AS NEEDED
Status: DISCONTINUED | OUTPATIENT
Start: 2021-03-08 | End: 2021-03-08 | Stop reason: HOSPADM

## 2021-03-08 RX ADMIN — TOPOTECAN 2.2 MG: 1 INJECTION, SOLUTION, CONCENTRATE INTRAVENOUS at 11:29

## 2021-03-08 RX ADMIN — SODIUM CHLORIDE 1000 ML: 9 INJECTION, SOLUTION INTRAVENOUS at 10:36

## 2021-03-08 RX ADMIN — ONDANSETRON 8 MG: 2 INJECTION INTRAMUSCULAR; INTRAVENOUS at 11:24

## 2021-03-08 RX ADMIN — SODIUM CHLORIDE, PRESERVATIVE FREE 10 ML: 5 INJECTION INTRAVENOUS at 12:09

## 2021-03-08 RX ADMIN — DEXAMETHASONE SODIUM PHOSPHATE 12 MG: 4 INJECTION, SOLUTION INTRA-ARTICULAR; INTRALESIONAL; INTRAMUSCULAR; INTRAVENOUS; SOFT TISSUE at 10:35

## 2021-03-08 RX ADMIN — HEPARIN 500 UNITS: 100 SYRINGE at 12:10

## 2021-03-09 ENCOUNTER — INFUSION (OUTPATIENT)
Dept: ONCOLOGY | Facility: HOSPITAL | Age: 71
End: 2021-03-09

## 2021-03-09 VITALS
DIASTOLIC BLOOD PRESSURE: 74 MMHG | HEART RATE: 89 BPM | RESPIRATION RATE: 14 BRPM | BODY MASS INDEX: 20.49 KG/M2 | TEMPERATURE: 97.8 F | SYSTOLIC BLOOD PRESSURE: 128 MMHG | WEIGHT: 112 LBS

## 2021-03-09 DIAGNOSIS — C34.90 SCLC (SMALL CELL LUNG CARCINOMA) (HCC): Primary | ICD-10-CM

## 2021-03-09 PROCEDURE — 25010000002 DEXAMETHASONE PER 1 MG: Performed by: INTERNAL MEDICINE

## 2021-03-09 PROCEDURE — 96366 THER/PROPH/DIAG IV INF ADDON: CPT

## 2021-03-09 PROCEDURE — 96413 CHEMO IV INFUSION 1 HR: CPT

## 2021-03-09 PROCEDURE — 25010000002 TOPOTECAN 4 MG/4ML SOLUTION 4 ML VIAL: Performed by: INTERNAL MEDICINE

## 2021-03-09 PROCEDURE — 96375 TX/PRO/DX INJ NEW DRUG ADDON: CPT

## 2021-03-09 PROCEDURE — 25010000003 HEPARIN LOCK FLUSH PER 10 UNITS: Performed by: NURSE PRACTITIONER

## 2021-03-09 RX ORDER — HEPARIN SODIUM (PORCINE) LOCK FLUSH IV SOLN 100 UNIT/ML 100 UNIT/ML
500 SOLUTION INTRAVENOUS AS NEEDED
Status: CANCELLED | OUTPATIENT
Start: 2021-03-09

## 2021-03-09 RX ORDER — SODIUM CHLORIDE 0.9 % (FLUSH) 0.9 %
10 SYRINGE (ML) INJECTION AS NEEDED
Status: CANCELLED | OUTPATIENT
Start: 2021-03-09

## 2021-03-09 RX ORDER — HEPARIN SODIUM (PORCINE) LOCK FLUSH IV SOLN 100 UNIT/ML 100 UNIT/ML
500 SOLUTION INTRAVENOUS AS NEEDED
Status: DISCONTINUED | OUTPATIENT
Start: 2021-03-09 | End: 2021-03-09 | Stop reason: HOSPADM

## 2021-03-09 RX ORDER — SODIUM CHLORIDE 0.9 % (FLUSH) 0.9 %
10 SYRINGE (ML) INJECTION AS NEEDED
Status: DISCONTINUED | OUTPATIENT
Start: 2021-03-09 | End: 2021-03-09 | Stop reason: HOSPADM

## 2021-03-09 RX ORDER — SODIUM CHLORIDE 9 MG/ML
250 INJECTION, SOLUTION INTRAVENOUS ONCE
Status: DISCONTINUED | OUTPATIENT
Start: 2021-03-09 | End: 2021-03-09 | Stop reason: HOSPADM

## 2021-03-09 RX ADMIN — TOPOTECAN 2.2 MG: 1 INJECTION, SOLUTION, CONCENTRATE INTRAVENOUS at 11:50

## 2021-03-09 RX ADMIN — SODIUM CHLORIDE, PRESERVATIVE FREE 10 ML: 5 INJECTION INTRAVENOUS at 12:32

## 2021-03-09 RX ADMIN — HEPARIN 500 UNITS: 100 SYRINGE at 12:32

## 2021-03-09 RX ADMIN — DEXAMETHASONE SODIUM PHOSPHATE 12 MG: 4 INJECTION, SOLUTION INTRA-ARTICULAR; INTRALESIONAL; INTRAMUSCULAR; INTRAVENOUS; SOFT TISSUE at 11:30

## 2021-03-10 ENCOUNTER — HOSPITAL ENCOUNTER (EMERGENCY)
Facility: HOSPITAL | Age: 71
Discharge: HOME | End: 2021-03-11
Attending: EMERGENCY MEDICINE | Admitting: EMERGENCY MEDICINE

## 2021-03-10 ENCOUNTER — APPOINTMENT (OUTPATIENT)
Dept: CT IMAGING | Facility: HOSPITAL | Age: 71
End: 2021-03-10

## 2021-03-10 ENCOUNTER — OFFICE VISIT (OUTPATIENT)
Dept: ONCOLOGY | Facility: CLINIC | Age: 71
End: 2021-03-10

## 2021-03-10 ENCOUNTER — APPOINTMENT (OUTPATIENT)
Dept: ONCOLOGY | Facility: HOSPITAL | Age: 71
End: 2021-03-10

## 2021-03-10 VITALS
RESPIRATION RATE: 12 BRPM | SYSTOLIC BLOOD PRESSURE: 101 MMHG | BODY MASS INDEX: 20.61 KG/M2 | TEMPERATURE: 98.2 F | HEIGHT: 62 IN | HEART RATE: 93 BPM | OXYGEN SATURATION: 95 % | DIASTOLIC BLOOD PRESSURE: 71 MMHG | WEIGHT: 112 LBS

## 2021-03-10 DIAGNOSIS — C34.90 SCLC (SMALL CELL LUNG CARCINOMA) (HCC): Primary | ICD-10-CM

## 2021-03-10 DIAGNOSIS — C34.90 MALIGNANT NEOPLASM OF LUNG, UNSPECIFIED LATERALITY, UNSPECIFIED PART OF LUNG (HCC): ICD-10-CM

## 2021-03-10 DIAGNOSIS — R33.9 URINARY RETENTION: Primary | ICD-10-CM

## 2021-03-10 DIAGNOSIS — E16.2 HYPOGLYCEMIA: ICD-10-CM

## 2021-03-10 LAB
ALBUMIN SERPL-MCNC: 2.1 G/DL (ref 3.5–5.2)
ALBUMIN/GLOB SERPL: 0.8 G/DL
ALP SERPL-CCNC: 123 U/L (ref 39–117)
ALT SERPL W P-5'-P-CCNC: 10 U/L (ref 1–33)
ANION GAP SERPL CALCULATED.3IONS-SCNC: 7.5 MMOL/L (ref 5–15)
AST SERPL-CCNC: 19 U/L (ref 1–32)
BASOPHILS # BLD AUTO: 0.03 10*3/MM3 (ref 0–0.2)
BASOPHILS NFR BLD AUTO: 0.3 % (ref 0–1.5)
BILIRUB SERPL-MCNC: 0.3 MG/DL (ref 0–1.2)
BILIRUB UR QL STRIP: NEGATIVE
BUN SERPL-MCNC: 24 MG/DL (ref 8–23)
BUN/CREAT SERPL: 27.3 (ref 7–25)
CALCIUM SPEC-SCNC: 7.2 MG/DL (ref 8.6–10.5)
CHLORIDE SERPL-SCNC: 100 MMOL/L (ref 98–107)
CLARITY UR: CLEAR
CO2 SERPL-SCNC: 21.5 MMOL/L (ref 22–29)
COLOR UR: YELLOW
CREAT SERPL-MCNC: 0.88 MG/DL (ref 0.57–1)
DEPRECATED RDW RBC AUTO: 53.8 FL (ref 37–54)
EOSINOPHIL # BLD AUTO: 0 10*3/MM3 (ref 0–0.4)
EOSINOPHIL NFR BLD AUTO: 0 % (ref 0.3–6.2)
ERYTHROCYTE [DISTWIDTH] IN BLOOD BY AUTOMATED COUNT: 14.4 % (ref 12.3–15.4)
GFR SERPL CREATININE-BSD FRML MDRD: 64 ML/MIN/1.73
GLOBULIN UR ELPH-MCNC: 2.8 GM/DL
GLUCOSE SERPL-MCNC: 62 MG/DL (ref 65–99)
GLUCOSE UR STRIP-MCNC: NEGATIVE MG/DL
HCT VFR BLD AUTO: 28.2 % (ref 34–46.6)
HGB BLD-MCNC: 9 G/DL (ref 12–15.9)
HGB UR QL STRIP.AUTO: NEGATIVE
IMM GRANULOCYTES # BLD AUTO: 0.05 10*3/MM3 (ref 0–0.05)
IMM GRANULOCYTES NFR BLD AUTO: 0.4 % (ref 0–0.5)
KETONES UR QL STRIP: NEGATIVE
LEUKOCYTE ESTERASE UR QL STRIP.AUTO: NEGATIVE
LYMPHOCYTES # BLD AUTO: 1.22 10*3/MM3 (ref 0.7–3.1)
LYMPHOCYTES NFR BLD AUTO: 10.8 % (ref 19.6–45.3)
MCH RBC QN AUTO: 32.7 PG (ref 26.6–33)
MCHC RBC AUTO-ENTMCNC: 31.9 G/DL (ref 31.5–35.7)
MCV RBC AUTO: 102.5 FL (ref 79–97)
MONOCYTES # BLD AUTO: 0.29 10*3/MM3 (ref 0.1–0.9)
MONOCYTES NFR BLD AUTO: 2.6 % (ref 5–12)
NEUTROPHILS NFR BLD AUTO: 85.9 % (ref 42.7–76)
NEUTROPHILS NFR BLD AUTO: 9.67 10*3/MM3 (ref 1.7–7)
NITRITE UR QL STRIP: NEGATIVE
NRBC BLD AUTO-RTO: 0 /100 WBC (ref 0–0.2)
PH UR STRIP.AUTO: <=5 [PH] (ref 5–8)
PLATELET # BLD AUTO: 259 10*3/MM3 (ref 140–450)
PMV BLD AUTO: 8.8 FL (ref 6–12)
POTASSIUM SERPL-SCNC: 4.2 MMOL/L (ref 3.5–5.2)
PROT SERPL-MCNC: 4.9 G/DL (ref 6–8.5)
PROT UR QL STRIP: NEGATIVE
RBC # BLD AUTO: 2.75 10*6/MM3 (ref 3.77–5.28)
SODIUM SERPL-SCNC: 129 MMOL/L (ref 136–145)
SP GR UR STRIP: 1.02 (ref 1–1.03)
UROBILINOGEN UR QL STRIP: NORMAL
WBC # BLD AUTO: 11.26 10*3/MM3 (ref 3.4–10.8)

## 2021-03-10 PROCEDURE — 85025 COMPLETE CBC W/AUTO DIFF WBC: CPT | Performed by: PHYSICIAN ASSISTANT

## 2021-03-10 PROCEDURE — 99284 EMERGENCY DEPT VISIT MOD MDM: CPT

## 2021-03-10 PROCEDURE — 51702 INSERT TEMP BLADDER CATH: CPT

## 2021-03-10 PROCEDURE — 99214 OFFICE O/P EST MOD 30 MIN: CPT | Performed by: INTERNAL MEDICINE

## 2021-03-10 PROCEDURE — 80053 COMPREHEN METABOLIC PANEL: CPT | Performed by: PHYSICIAN ASSISTANT

## 2021-03-10 PROCEDURE — 81003 URINALYSIS AUTO W/O SCOPE: CPT | Performed by: PHYSICIAN ASSISTANT

## 2021-03-10 PROCEDURE — 74176 CT ABD & PELVIS W/O CONTRAST: CPT

## 2021-03-10 RX ORDER — HYDROCODONE BITARTRATE AND ACETAMINOPHEN 5; 325 MG/1; MG/1
TABLET ORAL
Qty: 180 TABLET | Refills: 0 | Status: SHIPPED | OUTPATIENT
Start: 2021-03-10

## 2021-03-10 RX ORDER — SODIUM CHLORIDE 0.9 % (FLUSH) 0.9 %
10 SYRINGE (ML) INJECTION AS NEEDED
Status: DISCONTINUED | OUTPATIENT
Start: 2021-03-10 | End: 2021-03-11 | Stop reason: HOSPADM

## 2021-03-11 ENCOUNTER — TELEPHONE (OUTPATIENT)
Dept: ONCOLOGY | Facility: CLINIC | Age: 71
End: 2021-03-11

## 2021-03-11 VITALS
WEIGHT: 112 LBS | RESPIRATION RATE: 16 BRPM | HEART RATE: 83 BPM | HEIGHT: 60 IN | BODY MASS INDEX: 21.99 KG/M2 | OXYGEN SATURATION: 93 % | SYSTOLIC BLOOD PRESSURE: 101 MMHG | DIASTOLIC BLOOD PRESSURE: 71 MMHG | TEMPERATURE: 98.1 F

## 2021-03-11 DIAGNOSIS — R22.42 LOCALIZED SWELLING, MASS AND LUMP, LEFT LOWER LIMB: ICD-10-CM

## 2021-03-11 DIAGNOSIS — M25.472 LEFT ANKLE SWELLING: ICD-10-CM

## 2021-03-11 DIAGNOSIS — M25.472 ANKLE SWELLING, LEFT: ICD-10-CM

## 2021-03-11 LAB
GLUCOSE BLDC GLUCOMTR-MCNC: 56 MG/DL (ref 70–130)
GLUCOSE BLDC GLUCOMTR-MCNC: 70 MG/DL (ref 70–130)

## 2021-03-11 PROCEDURE — 25010000003 HEPARIN LOCK FLUSH PER 10 UNITS: Performed by: EMERGENCY MEDICINE

## 2021-03-11 PROCEDURE — 82962 GLUCOSE BLOOD TEST: CPT

## 2021-03-11 PROCEDURE — 96375 TX/PRO/DX INJ NEW DRUG ADDON: CPT

## 2021-03-11 PROCEDURE — 96374 THER/PROPH/DIAG INJ IV PUSH: CPT

## 2021-03-11 PROCEDURE — 25010000002 HEPARIN LOCK FLUSH PER 10 UNITS: Performed by: EMERGENCY MEDICINE

## 2021-03-11 RX ORDER — HEPARIN SODIUM (PORCINE) LOCK FLUSH IV SOLN 100 UNIT/ML 100 UNIT/ML
300 SOLUTION INTRAVENOUS ONCE
Status: COMPLETED | OUTPATIENT
Start: 2021-03-11 | End: 2021-03-11

## 2021-03-11 RX ORDER — FUROSEMIDE 20 MG/1
20 TABLET ORAL DAILY PRN
Qty: 5 TABLET | Refills: 0 | Status: SHIPPED | OUTPATIENT
Start: 2021-03-11

## 2021-03-11 RX ORDER — DEXTROSE MONOHYDRATE 25 G/50ML
25 INJECTION, SOLUTION INTRAVENOUS ONCE
Status: COMPLETED | OUTPATIENT
Start: 2021-03-11 | End: 2021-03-11

## 2021-03-11 RX ADMIN — SODIUM CHLORIDE, PRESERVATIVE FREE 300 UNITS: 5 INJECTION INTRAVENOUS at 02:21

## 2021-03-11 RX ADMIN — DEXTROSE MONOHYDRATE 25 ML: 25 INJECTION, SOLUTION INTRAVENOUS at 00:32

## 2021-03-11 NOTE — TELEPHONE ENCOUNTER
Caller: MARLENE    Relationship to patient: HOSPICE RN    Best call back number: [phone]    Patient is needing: REQUESTING COMFORT KIT WITH MORPHINE.

## 2021-03-11 NOTE — DISCHARGE INSTRUCTIONS
Ensure you are eating well-balanced meals throughout the day to keep your blood glucose up.  Keep catheter in place and care for as directed.  You can follow-up with our urologist Dr. Thomas for further evaluation and management of this urinary retention.  Follow-up with your primary care provider as early as possible as well as your oncologist.  Make sure you are using stool softeners if you are taking your pain medication.  Return here to the ER for any change, worsening symptoms, or any additional concerns including but not limited to severe or worsening abdominal pain, fever greater than 100.4, intractable vomiting.

## 2021-03-11 NOTE — ED NOTES
@ bs with provider. Provider attempted to disimpact pt, and provided a soap suds enema. PT had small amount of fluid out after enema.      Kristal Sharif, RN  03/10/21 5532     Prednisone Pregnancy And Lactation Text: This medication is Pregnancy Category C and it isn't know if it is safe during pregnancy. This medication is excreted in breast milk.

## 2021-03-11 NOTE — ED PROVIDER NOTES
Subjective   Patient is a 70-year-old female with a history of lung cancer, COPD, diabetes, hyperlipidemia and hypertension presenting to the ER for evaluation of difficulty urinating.  Patient states that she thinks it has been over 2 days since she urinated.  She states she has pressure and throbbing in her lower abdomen.  She follows with Dr. Cao and she states that given her overall prognosis, they have decided to stop chemotherapy.  Per Dr. Cao's chart, they have been discussing hospice for the patient.  She denies any recent fever, chills, nausea, emesis, change in bowel movements, dysuria or hematuria.  She denies any known history of kidney stones.  She states that she was prescribed hydrocodone today but has not taken it yet, denies any other recent medication changes.          Review of Systems   Constitutional: Negative for chills and fever.   HENT: Negative.    Eyes: Negative.    Respiratory: Negative.    Cardiovascular: Negative.    Gastrointestinal: Negative.    Genitourinary: Positive for difficulty urinating. Negative for dysuria and flank pain.   Musculoskeletal: Negative.    Skin: Negative.    Neurological: Negative.    Psychiatric/Behavioral: Negative.        Past Medical History:   Diagnosis Date   • Arthritis    • Body piercing     both ears   • Cancer (CMS/HCC)     lung   • Colitis    • COPD (chronic obstructive pulmonary disease) (CMS/HCC)    • Diabetes mellitus (CMS/HCC)    • Elevated cholesterol    • Full dentures    • Hyperlipidemia    • Hypertension    • Hypothyroidism    • PONV (postoperative nausea and vomiting)    • Wears glasses     for dentures       Allergies   Allergen Reactions   • Erythromycin Itching   • Penicillins Unknown - Low Severity     Report unknown reaction in childhood. Has taken ampicillin without difficulty.         Past Surgical History:   Procedure Laterality Date   • CARPAL TUNNEL RELEASE Left    • CERVICAL LYMPH NODE BIOPSY/EXCISION Left 12/2/2019     "Procedure: SUPRACLAVICULAR LYMPH NODE BIOPSY/EXCISION;  Surgeon: Bebe Owen MD;  Location: [facility];  Service: General   • COLONOSCOPY  2014   • HAND SURGERY  02/2018   • KNEE ARTHROSCOPY     • LAPAROSCOPIC TUBAL LIGATION     • MOUTH SURGERY      full extraction of teeth   • PORTACATH PLACEMENT N/A 1/3/2020    Procedure: INSERTION OF PORTACATH WITH ULTRASOUND AND FLUOROSCOPIC GUIDANCE;  Surgeon: Bebe Owen MD;  Location: [facility];  Service: General       Family History   Problem Relation Age of Onset   • Arthritis Mother    • Hypertension Mother    • Hyperlipidemia Mother    • Lung cancer Father    • Cancer Father    • Diabetes Sister    • Arthritis Maternal Grandmother    • Stroke Maternal Grandmother    • Diabetes Brother        Social History     Socioeconomic History   • Marital status:      Spouse name: Not on file   • Number of children: Not on file   • Years of education: Not on file   • Highest education level: Not on file   Tobacco Use   • Smoking status: Current Every Day Smoker     Packs/day: 0.25     Years: 50.00     Pack years: 12.50     Types: Cigarettes   • Smokeless tobacco: Never Used   Substance and Sexual Activity   • Alcohol use: No   • Drug use: No   • Sexual activity: Defer           Objective   Physical Exam  Vitals and nursing note reviewed.     /71 (Patient Position: Lying)   Pulse 83   Temp 98.1 °F (36.7 °C) (Oral)   Resp 16   Ht 152.4 cm (60\")   Wt 50.8 kg (112 lb)   SpO2 93%   BMI 21.87 kg/m²     GEN: No acute distress, sitting from stretcher.  She appears chronically ill and frail, cachectic.  She is awake and alert, answering questions appropriately.  Head: Normocephalic, atraumatic  Eyes: EOM intact  ENT: Mask in place per protocol  Cardiovascular: Regular rate and rhythm   Lungs: Clear to auscultation bilaterally without adventitious sounds  Abdomen: Distended, tender to palpation of the lower abdomen  Extremities: No edema, normal " appearance  Neuro: GCS 15  Psych: Mood and affect are appropriate    Procedures           ED Course  ED Course as of Mar 12 0148   Wed Mar 10, 2021   2235 Bladder is very distended and there is a lot of stool in the rectum on CT scan, awaiting official radiology read but do believe that she would benefit from a catheter to help relieve the pressure of her bladder.  She states she last had a bowel movement this morning.    [LA]   2253 Color, UA: Yellow [LA]   2305 Appearance, UA: Clear [LA]   2305 pH, UA: <=5.0 [LA]   2305 Specific Gravity, UA: 1.017 [LA]   2305 Glucose: Negative [LA]   2305 Ketones, UA: Negative [LA]   2305 Bilirubin, UA: Negative [LA]   2305 Blood, UA: Negative [LA]   2305 Protein, UA: Negative [LA]   2305 Leukocytes, UA: Negative [LA]   2305 Nitrite, UA: Negative [LA]   2305 Urobilinogen, UA: 0.2 E.U./dL [LA]   2318 WBC(!): 11.26 [LA]   2318 Stable in comparison to previous.   Hemoglobin(!): 9.0 [LA]   2318 Neutrophil Rel %(!): 85.9 [LA]   2318 Lymphocyte Rel %(!): 10.8 [LA]   2318 Monocyte Rel %(!): 2.6 [LA]   2318 Eosinophil Rel %(!): 0.0 [LA]   2327 Discussed findings the patient.  She is agreeable for disimpaction/soapsuds enema    [LA]   2344 Rectal exam performed with KESHIA Giraldo at bedside, soft stool, no obvious impaction.    [LA]   2358 Glucose(!): 62 [LA]   2358 BUN(!): 24 [LA]   2358 Creatinine: 0.88 [LA]   2358 Sodium(!): 129 [LA]   2358 Potassium: 4.2 [LA]   2358 Chloride: 100 [LA]   2358 CO2(!): 21.5 [LA]   2358 Calcium(!): 7.2 [LA]   2358 Total Protein(!): 4.9 [LA]   2358 Albumin(!): 2.10 [LA]   2358 ALT (SGPT): 10 [LA]   2358 AST (SGOT): 19 [LA]   2358 Alkaline Phosphatase(!): 123 [LA]   2358 Total Bilirubin: 0.3 [LA]   2358 eGFR Non  Am: 64 [LA]   2358 Globulin: 2.8 [LA]   2358 A/G Ratio: 0.8 [LA]   2358 BUN/Creatinine Ratio(!): 27.3 [LA]   2358 Anion Gap: 7.5 [LA]   Thu Mar 11, 2021   0002 Patient's blood glucose is 62, discussed with the nurse.  We will give juice here.     [LA]   0026 Glucose(!): 56 [LA]   0026 RN informed the patient's repeat glucose was 56.  Son states that she has not been eating very much.  Discussed with Dr. Pastrana, will give half amp of D50 and recheck    [LA]   0140 Glucose: 70 [PF]      ED Course User Index  [LA] Nita Ortiz PA-C  [PF] aSmi Pastrana W, DO                                           MDM  Number of Diagnoses or Management Options  Hypoglycemia  Malignant neoplasm of lung, unspecified laterality, unspecified part of lung (CMS/HCC)  Urinary retention  Diagnosis management comments: On arrival, patient is stable.  Differential could include renal failure, urinary obstruction, kidney stone, cystitis, and other concerns.  Will obtain bladder scan, may need to in and out catheter anchor a Xavier on this patient.  We will obtain CT scan to rule out underlying obstruction, basic labs.  We will also obtain urinalysis.    CT revealed severe distention of the bladder, stool within the rectal vault.  Decided to place a Xavier catheter and patient had over 1000 cc out before the nurse clamped the catheter off.  Urine had no signs of infection. CT as read by the radiologist revealed retroperitoneal mass which appears to have been present per previous chart review.  There is no fecal impaction, stool was soft on rectal exam, attempted a soapsuds enema.  Patient had hypoglycemia, since states she has been eating well lately.  They gave her juice and repeat was 54 so we gave D50 here.       Amount and/or Complexity of Data Reviewed  Clinical lab tests: reviewed  Tests in the radiology section of CPT®: reviewed  Decide to obtain previous medical records or to obtain history from someone other than the patient: yes      I received care from PA for follow-up of glucose which was found to be low.  Patient otherwise hemodynamically stable patient discharged home in stable condition with outpatient follow-up return cautions discussed.  Final diagnoses:   Urinary  retention   Malignant neoplasm of lung, unspecified laterality, unspecified part of lung (CMS/HCC)   Hypoglycemia            Sami Pastrana, DO  03/12/21 0144

## 2021-03-11 NOTE — TELEPHONE ENCOUNTER
Called and let Shirin know we would be fine with that if the can fax the script to us to send in.

## 2021-03-12 DIAGNOSIS — C34.90 SCLC (SMALL CELL LUNG CARCINOMA) (HCC): Primary | ICD-10-CM

## 2021-03-12 RX ORDER — MORPHINE SULFATE 20 MG/ML
5 SOLUTION ORAL
Qty: 15 ML | Refills: 0 | Status: SHIPPED | OUTPATIENT
Start: 2021-03-12

## 2021-03-12 NOTE — TELEPHONE ENCOUNTER
Received request for comfort care kit from hospice to include morphine. Routing script to on call MD

## 2021-03-15 DIAGNOSIS — C34.90 SCLC (SMALL CELL LUNG CARCINOMA) (HCC): Primary | ICD-10-CM

## 2021-03-15 RX ORDER — OXYCODONE HYDROCHLORIDE 10 MG/1
10 TABLET ORAL
Qty: 32 TABLET | Refills: 0 | Status: SHIPPED | OUTPATIENT
Start: 2021-03-15

## 2021-03-15 RX ORDER — OXYCODONE HYDROCHLORIDE 10 MG/1
10 TABLET ORAL
Qty: 112 TABLET | Refills: 0 | Status: SHIPPED | OUTPATIENT
Start: 2021-03-15 | End: 2021-03-15 | Stop reason: SDUPTHER

## 2021-03-15 RX ORDER — FENTANYL 25 UG/H
1 PATCH TRANSDERMAL
Qty: 5 PATCH | Refills: 0 | Status: SHIPPED | OUTPATIENT
Start: 2021-03-15

## 2021-03-15 NOTE — TELEPHONE ENCOUNTER
Angy with hospice called patient is having a lot of increased pain and she needs to see about switching her pain medication. Please call.

## 2024-01-25 NOTE — PROGRESS NOTES
Thank you for choosing [facility].   I have office hours 8:00 am to 4:30 pm on Monday's, Wednesday's, Thursday's and Friday's. My nurse and I are out of the office every Tuesday.    Following your visit, when your labs and diagnostic testing have returned, I will review then and you will be contacted by my nurse.  If you are on My Chart, you can also view results there.    For refills, notify your pharmacy regarding what you need and the pharmacy will generate a refill request. Do not call my nurse as she is unable to process refill request. Please plan ahead and allow 3-5 days for refill requests.    You will generally receive a reminder call the day prior to your appointment.  If you cannot attend your appointment, please cancel your appointment with as much notice as possible.  If there is a pattern of failure to present for your appointments, I cannot provide consistent, meaningful, ongoing care for you. It is very important to me that you come in for your care, so we can best assist you with your health care needs.    IMPORTANT:  Please note that it is my standard of practice to NOT participate in prescribing ongoing requested Narcotic Analgesic therapy, and/or participate in the prescribing of other controlled substances.  My nurse and I am happy to assist you with the process of referral for alternative pain management as needed, and other treatment modalities including but not limited to:  Physical Therapy, Physical Medicine and Rehab, Counseling, Chiropractic Care, Orthopedic Care, and non-narcotic medication management.     In the event that you need to be seen for emergent concerns and I am out of office,  please see one of my colleagues for acute concerns.  You may also present to  or ER.  I appreciate the opportunity to serve you and look forward to supporting your healthcare needs in the future. Please contact me with any questions or concerns that you may  43 y.o. female presents today for follow up of bilateral hand numbness, tingling, weakness and pain. The patient reports symptoms for months, getting worse.  Pain is controlled.  Reports no previous surgeries or trauma to the area.  Reports pain worse with use, better at rest.  Pain numb and tingly, wakes them from sleep.   Worse driving a car and talking on a phone.  Also complains of right dorsal hand pain over 2nd CMC.  Worse typing.  Brace helps some but not completely. Got EMG.  Had a shot that helped 1 month ago but symptoms returning.  States would like surgery.    Review of Systems    Constitutional: no fever, no chills, not feeling tired, no recent weight gain and no recent weight loss.   ENT: no nosebleeds.   Cardiovascular: no chest pain.   Respiratory: no shortness of breath and no cough.   Gastrointestinal: no abdominal pain, no nausea, no vomiting and no diarrhea.   Musculoskeletal: per HPI  Integumentary: no rashes and no skin wound.   Neurological: no headache.   Psychiatric: no depression and no sleep disturbances.   Endocrine: no muscle weakness and no muscle cramps.   Hematologic/Lymphatic: no swollen glands and no tendency for easy bruising.     All other systems have been reviewed and are negative for complaint    Patient's past medical history, past surgical history, allergies, and medications have been reviewed unless otherwise noted in the chart.     Carpal Tunnel Exam  Inspection:  no evidence of infection, no edema, no erythema, no ecchymosis, Palpation:  compartments are soft, no pain with palpation, Range of Motion:  full wrist and finger range of motion, Stability:  no wrist instability detected, Strength:  5/5 APB and intrinsics, Skin:  intact, Vascular:  capillary refill <2 seconds distally, Sensation:  decreased in the median nerve distribution, Test:  positive Tinel's at the Carpal Tunnel, positive Direct Carpal Tunnel Compression Test       Mass  Location:  Right dorsal 2nd CMC  DATE OF VISIT: 3/10/2021    REASON FOR VISIT: Followup for extensive stage small cell lung cancer     HISTORY OF PRESENT ILLNESS: The patient is a very pleasant 70 y.o. female with past medical history significant for extensive stage small cell lung cancer diagnosed December 2, 2019.  She was started on palliative treatment with carboplatin and etoposide and Tecentriq December 15, 2019. Tecentriq maintenance started 03/11/2020.  The patient had progressive disease and she was switched to Opdivo plus Yervoy.  She completed 4 cycles August 13, 2020.  This was followed by Opdivo single agent.  Repeat scans done December 7, 2020 revealed progressive disease.  She was started on third line treatment using Zepzelca December 2020.  The patient is here today for follow up visit.     SUBJECTIVE: Filippo is here today with her son.  She has been doing poorly over the last few days.  Her appetite went down.  Her energy is very low.  She is been having poor appetite.  Has been having increased pain in her back and lower extremities.      PAST MEDICAL HISTORY/SOCIAL HISTORY/FAMILY HISTORY: Reviewed by me and unchanged from my documentation done on 03/10/21.    Review of Systems   Constitutional: Positive for appetite change and fatigue. Negative for activity change, chills, fever and unexpected weight change.   HENT: Negative for hearing loss, mouth sores, nosebleeds, sore throat and trouble swallowing.    Eyes: Negative for visual disturbance.   Respiratory: Negative for cough, chest tightness, shortness of breath and wheezing.    Cardiovascular: Negative for chest pain and palpitations.   Gastrointestinal: Positive for constipation, nausea and vomiting. Negative for abdominal distention, abdominal pain, blood in stool, diarrhea and rectal pain.   Endocrine: Negative for cold intolerance and heat intolerance.   Genitourinary: Negative for difficulty urinating, dysuria, frequency and urgency.   Musculoskeletal: Positive for back  Specific:  CMC boss Inspection (mass size):  5 mm x 5 mm Palpation:  firm, nonmobile, noncompressible, TTP 2nd CMC Range of Motion:  full wrist and finger motion, Stability:  no instability noted, Strength:  5/5 hand and wrist, Skin:  intact, Vascular:  capillary refill <2 seconds distally, Sensation:  sensation intact to light touch distally.       Constitutional   General appearance: Alert and in no acute distress. Well developed, well nourished.    Eyes   External Eye, Conjunctiva and lids: Normal external exam - pupils were equal in size, round, reactive to light (PERRL) with normal accommodation and extraocular movements intact (EOMI).   Ears, Nose, Mouth, and Throat   Hearing: Normal.   Neck   Neck: No neck mass was observed. Supple.   Pulmonary   Respiratory effort: No respiratory distress.   Auscultation of lungs: Clear bilateral breath sounds.   Cardiovascular   Examination of extremities: No peripheral edema.   Auscultation of heart: Heart rate and rhythm were normal   Psychiatric   Judgment and insight: Intact.   Orientation to person, place, and time: Alert and oriented x 3.       Mood and affect: Normal.      XR - no fractures, dislocations, ulnar negative bilaterally    EMG - normal    Bilateral CTS, right 2nd CMC boss  Surgery discussion: I discussed the diagnosis and treatment options with the patient today along with their associated risks and benefits. After thorough discussion, the patient has elected to proceed with surgical intervention. The patient understands the risks of,including, but not limited to, bleeding, infection, loss of life or limb, pain, permanent numbness, tingling, weakness, loss of motion, failure of the goals of surgery or the potential need for additional surgery. The patient would like to accept these risks and proceed. All questions were answered to the patients satisfaction and the patient seems satisfied with the plan.    Plan right ECTR and right 2nd CMC boss excision -  have.    Sincerely,      Yael Mckeon RN, PA-C        pain. Negative for arthralgias, gait problem, joint swelling and myalgias.   Skin: Negative for rash.   Neurological: Negative for dizziness, tremors, syncope, weakness, light-headedness, numbness and headaches.   Hematological: Negative for adenopathy. Does not bruise/bleed easily.   Psychiatric/Behavioral: Negative for confusion, sleep disturbance and suicidal ideas. The patient is not nervous/anxious.          Current Outpatient Medications:   •  acetaminophen (TYLENOL) 500 MG tablet, Take 500 mg by mouth Every 6 (Six) Hours As Needed for Mild Pain ., Disp: , Rfl:   •  albuterol sulfate HFA (Ventolin HFA) 108 (90 Base) MCG/ACT inhaler, Inhale 2 puffs Every 4 (Four) Hours As Needed for Wheezing or Shortness of Air., Disp: 18 g, Rfl: 5  •  amLODIPine (NORVASC) 5 MG tablet, Take 1 tablet by mouth Daily., Disp: 90 tablet, Rfl: 3  •  baclofen (LIORESAL) 10 MG tablet, START- ONE TAB NIGHTLY X7 DAYS, THEN 1 TAB 2X A DAY X7 DAYS, THEN 1 TAB 3X A DAY, Disp: 270 tablet, Rfl: 1  •  Calcium Carbonate-Vitamin D (CALCIUM-CARB 600 + D) 600-125 MG-UNIT tablet, Take 500 mg by mouth 2 (Two) Times a Day., Disp: 60 each, Rfl: 5  •  diclofenac (VOLTAREN) 75 MG EC tablet, Take 1 tablet by mouth Daily As Needed (pain)., Disp: 90 tablet, Rfl: 3  •  docusate sodium (COLACE) 100 MG capsule, Take 1 tablet twice daily, Disp: 60 capsule, Rfl: 3  •  furosemide (LASIX) 20 MG tablet, Take 1 tablet by mouth Daily As Needed (leg swelling) for up to 5 doses., Disp: 5 tablet, Rfl: 0  •  gabapentin (NEURONTIN) 400 MG capsule, Take 1 capsule by mouth Daily With Breakfast & Lunch. May also take 2 capsules At Night As Needed (neuropathy, pain)., Disp: 270 capsule, Rfl: 1  •  ipratropium-albuterol (DUO-NEB) 0.5-2.5 mg/3 ml nebulizer, Take 3 mL by nebulization 4 (Four) Times a Day As Needed for Wheezing or Shortness of Air., Disp: 120 mL, Rfl: 5  •  levothyroxine (SYNTHROID, LEVOTHROID) 88 MCG tablet, Take 1 tablet by mouth Daily. Indications:  Underactive Thyroid, Disp: 90 tablet, Rfl: 3  •  lidocaine (LIDODERM) 5 %, Place 1 patch on the skin as directed by provider Daily. Remove & Discard patch within 12 hours or as directed by MD, Disp: 90 each, Rfl: 3  •  Lidocaine Viscous HCl (XYLOCAINE) 2 % solution, , Disp: , Rfl:   •  lidocaine-prilocaine (EMLA) 2.5-2.5 % cream, Apply  topically to the appropriate area as directed As Needed (45-60 minutes prior to port access.  Cover with saran/plastic wrap.)., Disp: 30 g, Rfl: 3  •  LORazepam (Ativan) 1 MG tablet, Take 1 tablet by mouth Every 8 (Eight) Hours As Needed for Anxiety., Disp: 90 tablet, Rfl: 2  •  magic mouthwash oral suspension, Swish and spit (or swallow) 1-2 Teaspoonfuls (5-10ml) 4 times a day as needed, Disp: 240 mL, Rfl: 3  •  methylPREDNISolone (MEDROL) 4 MG dose pack, Take as directed on package instructions., Disp: 21 tablet, Rfl: 0  •  Nebulizer device, 1 Device Take As Directed., Disp: 1 each, Rfl: 0  •  OLANZapine (zyPREXA) 5 MG tablet, Take 1 tablet by mouth Every Night., Disp: 30 tablet, Rfl: 5  •  omeprazole (priLOSEC) 40 MG capsule, TAKE 1 CAPSULE BY MOUTH EVERY DAY, Disp: 90 capsule, Rfl: 2  •  ondansetron (ZOFRAN) 8 MG tablet, Take 1 tablet by mouth 3 (Three) Times a Day As Needed for Nausea or Vomiting., Disp: 30 tablet, Rfl: 5  •  ondansetron ODT (ZOFRAN-ODT) 8 MG disintegrating tablet, PLACE 1 TABLET ON THE TONGUE EVERY 8 (EIGHT) HOURS AS NEEDED FOR NAUSEA OR VOMITING., Disp: 30 tablet, Rfl: 4  •  pravastatin (PRAVACHOL) 40 MG tablet, Take 1 tablet by mouth Daily., Disp: 90 tablet, Rfl: 3  •  prochlorperazine (COMPAZINE) 10 MG tablet, Take 1 tablet by mouth Every 6 (Six) Hours As Needed for Nausea or Vomiting., Disp: 60 tablet, Rfl: 5  •  Stiolto Respimat 2.5-2.5 MCG/ACT aerosol solution inhaler, INHALE 2 PUFFS BY MOUTH EVERY DAY, Disp: 1 inhaler, Rfl: 3  •  traMADol (ULTRAM) 50 MG tablet, Take 1 tablet by mouth Every 8 (Eight) Hours As Needed for Severe Pain ., Disp: 270 tablet,  patient had a steroid shot about 1 month ago that she states alleviated her numbness and tingling but it has since returned.  States she is having a lot of night pain that can wake her from sleep and pain working on the computer.  We have tried conservative care but her symptoms persist.  I think it is reasonable to pursue surgical intervention since the patient would like further intervention.  FU 2 weeks after - No XR   "Rfl: 1    Current Facility-Administered Medications:   •  heparin injection 500 Units, 500 Units, Intracatheter, Q8H PRN, , Kathe R, APRN, 500 Units at 01/27/21 1541    PHYSICAL EXAMINATION:   /71   Pulse 93   Temp 98.2 °F (36.8 °C) (Temporal)   Resp 12   Ht 157.5 cm (62\")   Wt 50.8 kg (112 lb)   SpO2 95%   BMI 20.49 kg/m²    ECOG Performance Status: 3 - Symptomatic, >50% confined to bed  General Appearance:  alert, cooperative, no apparent distress, appears stated age and normal weight   Neurologic/Psychiatric: A&O x 3, gait steady, appropriate affect, strength 5/5 in all muscle groups   HEENT:  Normocephalic, without obvious abnormality, mucous membranes moist   Neck: Supple, symmetrical, trachea midline, no adenopathy;  No thyromegaly, masses, or tenderness   Lungs:   Clear to auscultation bilaterally; respirations regular, even, and unlabored bilaterally   Heart:  Regular rate and rhythm, no murmurs appreciated   Abdomen:   Soft, non-tender, non-distended and no organomegaly   Lymph nodes: No cervical, supraclavicular, inguinal or axillary adenopathy noted.  Submandibular nodule noted approximately 5cm on right. Left sternocleidomastoid mass approximately 3cm.    Extremities: Normal, atraumatic; no clubbing, cyanosis, or edema    Skin: No rashes, ulcers, or suspicious lesions noted     Infusion on 03/08/2021   Component Date Value Ref Range Status   • Glucose 03/08/2021 78  65 - 99 mg/dL Final   • BUN 03/08/2021 17  8 - 23 mg/dL Final   • Creatinine 03/08/2021 0.78  0.57 - 1.00 mg/dL Final   • Sodium 03/08/2021 128* 136 - 145 mmol/L Final   • Potassium 03/08/2021 3.9  3.5 - 5.2 mmol/L Final   • Chloride 03/08/2021 98  98 - 107 mmol/L Final   • CO2 03/08/2021 22.1  22.0 - 29.0 mmol/L Final   • Calcium 03/08/2021 7.4* 8.6 - 10.5 mg/dL Final   • Total Protein 03/08/2021 5.2* 6.0 - 8.5 g/dL Final   • Albumin 03/08/2021 2.10* 3.50 - 5.20 g/dL Final   • ALT (SGPT) 03/08/2021 9  1 - 33 U/L Final   • " AST (SGOT) 03/08/2021 23  1 - 32 U/L Final   • Alkaline Phosphatase 03/08/2021 146* 39 - 117 U/L Final   • Total Bilirubin 03/08/2021 0.2  0.0 - 1.2 mg/dL Final   • eGFR Non  Amer 03/08/2021 73  >60 mL/min/1.73 Final   • Globulin 03/08/2021 3.1  gm/dL Final   • A/G Ratio 03/08/2021 0.7  g/dL Final   • BUN/Creatinine Ratio 03/08/2021 21.8  7.0 - 25.0 Final   • Anion Gap 03/08/2021 7.9  5.0 - 15.0 mmol/L Final   • WBC 03/08/2021 10.99* 3.40 - 10.80 10*3/mm3 Final   • RBC 03/08/2021 2.68* 3.77 - 5.28 10*6/mm3 Final   • Hemoglobin 03/08/2021 8.8* 12.0 - 15.9 g/dL Final   • Hematocrit 03/08/2021 27.3* 34.0 - 46.6 % Final   • MCV 03/08/2021 101.9* 79.0 - 97.0 fL Final   • MCH 03/08/2021 32.8  26.6 - 33.0 pg Final   • MCHC 03/08/2021 32.2  31.5 - 35.7 g/dL Final   • RDW 03/08/2021 14.4  12.3 - 15.4 % Final   • RDW-SD 03/08/2021 53.5  37.0 - 54.0 fl Final   • MPV 03/08/2021 9.0  6.0 - 12.0 fL Final   • Platelets 03/08/2021 289  140 - 450 10*3/mm3 Final   • Neutrophil % 03/08/2021 76.7* 42.7 - 76.0 % Final   • Lymphocyte % 03/08/2021 13.0* 19.6 - 45.3 % Final   • Monocyte % 03/08/2021 9.4  5.0 - 12.0 % Final   • Eosinophil % 03/08/2021 0.1* 0.3 - 6.2 % Final   • Basophil % 03/08/2021 0.3  0.0 - 1.5 % Final   • Immature Grans % 03/08/2021 0.5  0.0 - 0.5 % Final   • Neutrophils, Absolute 03/08/2021 8.44* 1.70 - 7.00 10*3/mm3 Final   • Lymphocytes, Absolute 03/08/2021 1.43  0.70 - 3.10 10*3/mm3 Final   • Monocytes, Absolute 03/08/2021 1.03* 0.10 - 0.90 10*3/mm3 Final   • Eosinophils, Absolute 03/08/2021 0.01  0.00 - 0.40 10*3/mm3 Final   • Basophils, Absolute 03/08/2021 0.03  0.00 - 0.20 10*3/mm3 Final   • Immature Grans, Absolute 03/08/2021 0.05  0.00 - 0.05 10*3/mm3 Final   • nRBC 03/08/2021 0.0  0.0 - 0.2 /100 WBC Final      CT Soft Tissue Neck With Contrast    Result Date: 2/25/2021  Narrative: PROCEDURE: CT CHEST W CONTRAST DIAGNOSTIC-, CT SOFT TISSUE NECK W CONTRAST-, CT ABDOMEN PELVIS W CONTRAST-  HISTORY:  Follow-up extensive stage small cell lung cancer; C34.90-Malignant neoplasm of unspecified part of unspecified bronchus or lung  COMPARISON: December 7, 2020.  PROCEDURE: Axial images were obtained from the skull base to the pubic symphysis by computed tomography following the administration of Isovue-300 contrast.  FINDINGS:  NECK: There has been significant interval enlargement in a right submandibular mass which measures 5.1 x 4.2 x 3.6 cm on today's exam and previously measured 2.9 x 1.9 x 3.0 cm. A previously noted mass deep to the right sternocleidomastoid muscle has also increased in size measuring 2.9 x 1.9 cm and previously measuring 1.7 x 1.3 cm. The mass deep to the left sternocleidomastoid muscle has also increased in size measuring 2.5 x 2.3 cm and previously measuring 0.9 x 0.7 cm.  CHEST: Soft tissue windows redemonstrate subcentimeter mediastinal lymph nodes which are unchanged. There are small bilateral pleural effusions. There is no pericardial effusion. Lung windows reveal centrilobular emphysema. Scarring is seen in the right lung apex and both lung bases. There are no suspicious pulmonary nodules. A calcified granuloma in the lingula is unchanged. Bone windows reveal no lytic or destructive lesions.  ABDOMEN: An enhancing mass in the right upper quadrant superior to the kidney has increased significantly in size compared to the prior exam measuring 11.4 x 10.3 cm and previously measuring 6.3 x 6.6 cm. There is an approximately 2.9 cm left adrenal mass. There is retroperitoneal adenopathy, the largest of which is a left periaortic lymph node measuring 2.2 cm. The liver, spleen, pancreas and kidneys are unremarkable. The abdominal portions of the GI tract are unremarkable.  PELVIS: The pelvic viscera and pelvic GI tract is unremarkable. There is a small amount of fluid in the pelvis. Bone windows reveal no lytic or destructive lesions.      Impression: Interval progression of the patient's  metastatic disease with significant interval enlargement in bilateral neck masses, a right adrenal mass and retroperitoneal adenopathy.         This study was performed with techniques to keep radiation doses as low as reasonably achievable (ALARA). Individualized dose reduction techniques using automated exposure control or adjustment of mA and/or kV according to the patient size were employed.    This report was finalized on 2/25/2021 9:09 AM by Pamela Oliveros M.D..    CT Chest With Contrast Diagnostic    Result Date: 2/25/2021  Narrative: PROCEDURE: CT CHEST W CONTRAST DIAGNOSTIC-, CT SOFT TISSUE NECK W CONTRAST-, CT ABDOMEN PELVIS W CONTRAST-  HISTORY: Follow-up extensive stage small cell lung cancer; C34.90-Malignant neoplasm of unspecified part of unspecified bronchus or lung  COMPARISON: December 7, 2020.  PROCEDURE: Axial images were obtained from the skull base to the pubic symphysis by computed tomography following the administration of Isovue-300 contrast.  FINDINGS:  NECK: There has been significant interval enlargement in a right submandibular mass which measures 5.1 x 4.2 x 3.6 cm on today's exam and previously measured 2.9 x 1.9 x 3.0 cm. A previously noted mass deep to the right sternocleidomastoid muscle has also increased in size measuring 2.9 x 1.9 cm and previously measuring 1.7 x 1.3 cm. The mass deep to the left sternocleidomastoid muscle has also increased in size measuring 2.5 x 2.3 cm and previously measuring 0.9 x 0.7 cm.  CHEST: Soft tissue windows redemonstrate subcentimeter mediastinal lymph nodes which are unchanged. There are small bilateral pleural effusions. There is no pericardial effusion. Lung windows reveal centrilobular emphysema. Scarring is seen in the right lung apex and both lung bases. There are no suspicious pulmonary nodules. A calcified granuloma in the lingula is unchanged. Bone windows reveal no lytic or destructive lesions.  ABDOMEN: An enhancing mass in the right  upper quadrant superior to the kidney has increased significantly in size compared to the prior exam measuring 11.4 x 10.3 cm and previously measuring 6.3 x 6.6 cm. There is an approximately 2.9 cm left adrenal mass. There is retroperitoneal adenopathy, the largest of which is a left periaortic lymph node measuring 2.2 cm. The liver, spleen, pancreas and kidneys are unremarkable. The abdominal portions of the GI tract are unremarkable.  PELVIS: The pelvic viscera and pelvic GI tract is unremarkable. There is a small amount of fluid in the pelvis. Bone windows reveal no lytic or destructive lesions.      Impression: Interval progression of the patient's metastatic disease with significant interval enlargement in bilateral neck masses, a right adrenal mass and retroperitoneal adenopathy.         This study was performed with techniques to keep radiation doses as low as reasonably achievable (ALARA). Individualized dose reduction techniques using automated exposure control or adjustment of mA and/or kV according to the patient size were employed.    This report was finalized on 2/25/2021 9:09 AM by Pamela Oliveros M.D..    CT Abdomen Pelvis With Contrast    Result Date: 2/25/2021  Narrative: PROCEDURE: CT CHEST W CONTRAST DIAGNOSTIC-, CT SOFT TISSUE NECK W CONTRAST-, CT ABDOMEN PELVIS W CONTRAST-  HISTORY: Follow-up extensive stage small cell lung cancer; C34.90-Malignant neoplasm of unspecified part of unspecified bronchus or lung  COMPARISON: December 7, 2020.  PROCEDURE: Axial images were obtained from the skull base to the pubic symphysis by computed tomography following the administration of Isovue-300 contrast.  FINDINGS:  NECK: There has been significant interval enlargement in a right submandibular mass which measures 5.1 x 4.2 x 3.6 cm on today's exam and previously measured 2.9 x 1.9 x 3.0 cm. A previously noted mass deep to the right sternocleidomastoid muscle has also increased in size measuring 2.9 x  1.9 cm and previously measuring 1.7 x 1.3 cm. The mass deep to the left sternocleidomastoid muscle has also increased in size measuring 2.5 x 2.3 cm and previously measuring 0.9 x 0.7 cm.  CHEST: Soft tissue windows redemonstrate subcentimeter mediastinal lymph nodes which are unchanged. There are small bilateral pleural effusions. There is no pericardial effusion. Lung windows reveal centrilobular emphysema. Scarring is seen in the right lung apex and both lung bases. There are no suspicious pulmonary nodules. A calcified granuloma in the lingula is unchanged. Bone windows reveal no lytic or destructive lesions.  ABDOMEN: An enhancing mass in the right upper quadrant superior to the kidney has increased significantly in size compared to the prior exam measuring 11.4 x 10.3 cm and previously measuring 6.3 x 6.6 cm. There is an approximately 2.9 cm left adrenal mass. There is retroperitoneal adenopathy, the largest of which is a left periaortic lymph node measuring 2.2 cm. The liver, spleen, pancreas and kidneys are unremarkable. The abdominal portions of the GI tract are unremarkable.  PELVIS: The pelvic viscera and pelvic GI tract is unremarkable. There is a small amount of fluid in the pelvis. Bone windows reveal no lytic or destructive lesions.      Impression: Interval progression of the patient's metastatic disease with significant interval enlargement in bilateral neck masses, a right adrenal mass and retroperitoneal adenopathy.         This study was performed with techniques to keep radiation doses as low as reasonably achievable (ALARA). Individualized dose reduction techniques using automated exposure control or adjustment of mA and/or kV according to the patient size were employed.    This report was finalized on 2/25/2021 9:09 AM by Pamela Oliveros M.D..    XR Hip With or Without Pelvis 2 - 3 View Right    Result Date: 2/26/2021  Narrative: PROCEDURE: XR HIP W OR WO PELVIS 2-3 VIEW RIGHT-  HISTORY:  falll in parking lot. Pain in [facility] thip; C80.1-Malignant (primary) neoplasm, unspecified; [id number] in diseases classified elsewhere  COMPARISON: None.  FINDINGS: There are no fractures or dislocations. The joint spaces are preserved. The pubic symphysis and SI joints are intact. The soft tissues are unremarkable.      Impression: No fracture or dislocation.  This report was finalized on 2/26/2021 12:44 PM by Pamela Oliveros M.D..    ASSESSMENT: The patient is a very pleasant 70 y.o. female  with right upper lobe extensive stage small cell lung cancer     PROBLEM LIST:   1.  Right upper lobe extensive stage small cell lung cancer, [id number] stage IVb:  A.  Presented with right lower ribs pain  B.  CT chest revealed 3.5 cm right upper lobe lung mass, right adrenal nodule, mediastinal adenopathy, and left cervical lymphadenopathy.  C.  Status post left cervical lymph node biopsy done by Dr. Owen December 2, 2019  D.  Pathology consistent with small cell carcinoma  E.  Started palliative treatment with carboplatin and etoposide and Tecentriq December 15, 2019, status post 8 cycles  F.  Progressive disease documented CT scans done on June 1, 2020 with a new cervical lymphadenopathy and relapsed right adrenal mass  G.  Started Opdivo plus Yervoy Brenda 10, 2020, status post 4 cycle followed by Opdivo single agent with unfortunately progressive disease documented on CT scans done December 7, 2020.  H.  Zepzelca with 25% dose reduction started December 16, 2020, and is post 2 cycle  I.  Progressive disease documented on CT scans done on February 24, 2021 with Interval progression of the patient's metastatic disease  with significant interval enlargement in bilateral neck masses, a right adrenal mass and retroperitoneal adenopathy  J.  We will plan to start topotecan on March 8, 2021.  2.  COPD  3.  Hypertension  4.  Hypercholesterolemia  5.  Hypothyroid  6.  GERD  7.  Progressive nausea and vomiting  8. Anxiety  9.  Cancer related pain  10. Treatment related neuropathy    PLAN:  1.  I had long discussion today with the patient about her overall prognosis.  Given her weakness and worsening performance status I recommend stopping chemotherapy at this point.  2.  We discussed role of hospice, patient is interested.  I will refer her to hospice and I will continue to be the primary provider with hospice.  3.  I will continue tramadol as needed however I will add Norco 5/325 mg 1-2 every 6 hours as needed for cancer-related pain.  4.She will continue to keep her feet elevated and compression stockings for bilateral lower extremity edema.   5.  We will continue omeprazole for GERD.   6.  I will continue patient on Zofran as needed for chemotherapy-induced nausea as well as phenergan.  7. We will continue ativan for anxiety. I will add a dose today prior to treatment.   8. We will gabapentin as needed for neuropathy.  9.  I will see the patient in 1 month.    Rupesh Cao MD  3/10/2021

## (undated) DEVICE — DRSNG TELFA PAD NONADH STR 1S 3X8IN

## (undated) DEVICE — FLEXIBLE YANKAUER,MEDIUM TIP, NO VACUUM CONTROL: Brand: ARGYLE

## (undated) DEVICE — SPNG GZ STRL 2S 4X4 12PLY

## (undated) DEVICE — CUFF SCD HEMOFORCE SEQ CALF STD MD

## (undated) DEVICE — DRSNG WND GZ PAD BORDERED LF 4X5IN STRL

## (undated) DEVICE — DRSNG SURESITE123 6X8IN

## (undated) DEVICE — TRY SKINPREP PVP SCRB W PAINT

## (undated) DEVICE — SUT VIC 3/0 SH 27IN J416H

## (undated) DEVICE — UNDYED BRAIDED (POLYGLACTIN 910), SYNTHETIC ABSORBABLE SUTURE: Brand: COATED VICRYL

## (undated) DEVICE — STRIP,CLOSURE,WOUND,MEDI-STRIP,1/2X4: Brand: MEDLINE

## (undated) DEVICE — ADHS LIQ MASTISOL 2/3ML

## (undated) DEVICE — SUT VIC 2/0 SH 27IN

## (undated) DEVICE — GLV SURG SENSICARE GREEN W/ALOE PF LF 6 STRL

## (undated) DEVICE — SYR LUERLOK 20CC BX/50

## (undated) DEVICE — CVR PROB ULTRASND GLS STRL

## (undated) DEVICE — NDL HYPO ECLPS SFTY 25G 1 1/2IN

## (undated) DEVICE — RICH MAJOR PROCEDURE: Brand: MEDLINE INDUSTRIES, INC.

## (undated) DEVICE — GLV SURG ULTRATOUCH BIOGEL/COAT PF LF SZ6 STRL

## (undated) DEVICE — SUT PROLN 2/0 CT2 30IN 8411H

## (undated) DEVICE — DECANT BG O JET

## (undated) DEVICE — SOL NACL 0.9PCT 1000ML

## (undated) DEVICE — SYR LUERLOK 5CC

## (undated) DEVICE — CLAVICLE STRAP: Brand: DEROYAL

## (undated) DEVICE — NDL HYPO ECLPS SFTY 18G 1 1/2IN

## (undated) DEVICE — SYR LL TP 10ML STRL

## (undated) DEVICE — Device

## (undated) DEVICE — DRSNG WND GZ PAD BORDERED 4X8IN STRL